# Patient Record
Sex: FEMALE | Race: BLACK OR AFRICAN AMERICAN | NOT HISPANIC OR LATINO | Employment: OTHER | URBAN - METROPOLITAN AREA
[De-identification: names, ages, dates, MRNs, and addresses within clinical notes are randomized per-mention and may not be internally consistent; named-entity substitution may affect disease eponyms.]

---

## 2017-09-25 ENCOUNTER — TELEPHONE (OUTPATIENT)
Dept: SURGERY | Facility: CLINIC | Age: 72
End: 2017-09-25

## 2017-09-25 NOTE — TELEPHONE ENCOUNTER
"----- Message from Fadia Wallace MD sent at 9/25/2017  4:13 PM CDT -----  Does this patient have records? Please find out and obtain for appt tmrw, ty    09/25/17     3082  Contacted patient regarding obtaining outside records prior to visit. Patient states "Dr. Sarina Macias told me that she would send it over. Let me reschedule the appointment for another day to give them time to gather the information." Appointment made for 09/27/17 at 1:20pm. Date and time confirmed. Patient verbalized understanding.    "

## 2017-09-27 ENCOUNTER — OFFICE VISIT (OUTPATIENT)
Dept: SURGERY | Facility: CLINIC | Age: 72
End: 2017-09-27
Payer: MEDICARE

## 2017-09-27 VITALS
TEMPERATURE: 98 F | HEIGHT: 61 IN | WEIGHT: 290 LBS | HEART RATE: 107 BPM | DIASTOLIC BLOOD PRESSURE: 86 MMHG | BODY MASS INDEX: 54.75 KG/M2 | SYSTOLIC BLOOD PRESSURE: 139 MMHG

## 2017-09-27 DIAGNOSIS — Z17.0 CANCER OF LEFT BREAST, STAGE 1, ESTROGEN RECEPTOR POSITIVE: ICD-10-CM

## 2017-09-27 DIAGNOSIS — C50.912 BREAST CANCER, STAGE 1, LEFT: Primary | ICD-10-CM

## 2017-09-27 DIAGNOSIS — C50.912 CANCER OF LEFT BREAST, STAGE 1, ESTROGEN RECEPTOR POSITIVE: ICD-10-CM

## 2017-09-27 PROCEDURE — 99999 PR PBB SHADOW E&M-EST. PATIENT-LVL III: CPT | Mod: PBBFAC,,, | Performed by: SURGERY

## 2017-09-27 PROCEDURE — 1125F AMNT PAIN NOTED PAIN PRSNT: CPT | Mod: S$GLB,,, | Performed by: SURGERY

## 2017-09-27 PROCEDURE — 1159F MED LIST DOCD IN RCRD: CPT | Mod: S$GLB,,, | Performed by: SURGERY

## 2017-09-27 PROCEDURE — 99205 OFFICE O/P NEW HI 60 MIN: CPT | Mod: 57,S$GLB,, | Performed by: SURGERY

## 2017-09-27 PROCEDURE — 3008F BODY MASS INDEX DOCD: CPT | Mod: S$GLB,,, | Performed by: SURGERY

## 2017-09-27 NOTE — PROGRESS NOTES
History & Physical    SUBJECTIVE:     History of Present Illness:  Pt referred by Dr. Macias for biopsy proven left breast cancer.  She gets her mammograms at [facility] every year since the age of 41.  She has no prior symptoms of breast pain, breast mass, nipple discharge, or skin change.    Menarche occurred at age 14.  Menopause at age 51.  She still has her uterus and ovaries in situ.  She is  with age of first pregnancy at 21.  No history of hormone replacement therapy.  She took oral contraceptive pills for 20-30 years.  No history of biopsy.  She has a maternal aunt with breast cancer and this aunt's daughter which is her first cousin has breast cancer also.    The patient has a history of sickle cell trait with no manifestation.  No history of transfusion.    Her images are not available for interpretation by me today.  However the reports show a 1.1 cm mass in the left breast at 8:00 which is hypoechoic with irregular borders, the axilla was scanned and negative.  This correlates to mammographic abnormality on the left.  A biopsy was performed which shows invasive ductal carcinoma high grade ER + greater than 95%, IA +93.6%, HER-2 +3+ by immunohistochemistry, Ki-67 greater than 20%, HER-2/bharti fish positive ratio greater than 11.4.  Cc left breast cancer    Review of patient's allergies indicates:  No Known Allergies    Current Outpatient Prescriptions   Medication Sig Dispense Refill    aspirin (ECOTRIN) 81 MG EC tablet Take 81 mg by mouth once daily.      loratadine (CLARITIN) 5 mg chewable tablet Take 5 mg by mouth once daily.      metolazone (ZAROXOLYN) 5 MG tablet 5 mg once daily.       peg 3350-electrolytes-vit C (MOVIPREP) 100-7.5-2.691 gram PwPk Take as directed 1 packet 0    potassium chloride (KLOR-CON) 10 MEQ TbSR 10 mEq 2 (two) times daily.       pravastatin (PRAVACHOL) 40 MG tablet 40 mg 2 (two) times daily.       valsartan-hydrochlorothiazide (DIOVAN-HCT) 160-25 mg per tablet        No  "current facility-administered medications for this visit.        Past Medical History:   Diagnosis Date    Diabetes mellitus     Hyperlipidemia     Hypertension      Past Surgical History:   Procedure Laterality Date    Hammer toe Left      No family history on file.  Social History   Substance Use Topics    Smoking status: Former Smoker     Packs/day: 0.90     Years: 5.00     Types: Cigarettes    Smokeless tobacco: Former User    Alcohol use 0.6 oz/week     1 Shots of liquor per week          Review of Systems   Constitutional: Negative for activity change, appetite change, chills and fever.   HENT: Negative for congestion and sore throat.    Eyes: Negative for photophobia and visual disturbance.   Respiratory: Negative for cough, shortness of breath and wheezing.    Cardiovascular: Negative for chest pain and palpitations.   Gastrointestinal: Negative for abdominal distention and abdominal pain.   Genitourinary: Negative for difficulty urinating, dysuria and frequency.   Musculoskeletal: Negative for gait problem and joint swelling.   Skin: Negative for rash and wound.   Neurological: Negative for dizziness and headaches.   Hematological: Negative for adenopathy. Does not bruise/bleed easily.   Psychiatric/Behavioral: Negative for dysphoric mood and sleep disturbance.       OBJECTIVE:     Vital Signs (Most Recent)  Temp: 97.7 °F (36.5 °C) (09/27/17 1334)  Pulse: 107 (09/27/17 1334)  BP: 139/86 (09/27/17 1334)  5' 1" (1.549 m)  131.5 kg (290 lb 0.2 oz)     Physical Exam   Constitutional: She is oriented to person, place, and time. She appears well-developed and well-nourished. No distress.   HENT:   Head: Normocephalic and atraumatic.   Eyes: Conjunctivae and EOM are normal. No scleral icterus.   Neck: Normal range of motion.   Cardiovascular: Normal rate and intact distal pulses.    Pulmonary/Chest: Effort normal. No stridor. No respiratory distress.   Abdominal: Soft. She exhibits no distension. There is " no tenderness.   Musculoskeletal: Normal range of motion. She exhibits no edema or tenderness.   Neurological: She is alert and oriented to person, place, and time.   Skin: No rash noted. No pallor.   Psychiatric: She has a normal mood and affect. Her behavior is normal.       Laboratory  n/a    Diagnostic Results:  n/a    ASSESSMENT/PLAN:     Assessment:      Clinical Stage IA ([id number]) carcinoma left breast left LIQ.  Triple positive 1.1cm.      Plan:     Options for management were discussed with the patient and her family. We reviewed the existing data noting the equivalency of breast conserving surgery with radiation therapy and mastectomy. We also reviewed the guidelines of the National Comprehensive Cancer Network for Stage I breast carcinoma. We discussed the need for lumpectomy margins to be negative for carcinoma, the necessity for postoperative radiation therapy after breast conservation in most cases, the possibility of a failed or false negative sentinel lymph node biopsy and the potential need for complete lymphadenectomy for a failed or positive sentinel lymph node biopsy were fully discussed. In the setting of mastectomy, delayed or immediate reconstruction options are available and were discussed.      In the setting of lumpectomy, radiation therapy would be recommended majority of the time.  The duration and treatment side effects were discussed with the patient.  This will coordinated with the radiation oncologist pending final pathology.     We also discussed the role of systemic therapy in the treatment of early stage breast cancer.  We discussed that this is based on tumor biology and shalom status and will be determined based on final pathology.  We discussed that if the cancer is hormone positive, endocrine therapy would be recommended in most cases and its use can reduce the risk of recurrence as well as improve survival. Side effects of treatment were briefly discussed. We also discussed the  potential role for chemotherapy based on a number of factors such as tumor phenotype (ER+ vs. triple negative vs. Tih1ttb+) and this would be determined in coordination with the medical oncologist.     The patient, in consultation with her family, has elected to proceed with left partial mastectomy and sentinel lymph node biopsy, Port placement RIGHT. The risks of surgery were described to the patient including bleeding, infection, pain, scarring, wound complications, injury to local structures, breast asymmetry, positive margin, recurrence, and potential need for further surgery. She demonstrated understanding of the risks and consent form signed today.      Patient was educated on breast cancer, receptors, wire localization lumpectomy, mastectomy, sentinel lymph node mapping and biopsy, axillary lymph node dissection, reconstruction, breast prosthesis with post-mastectomy bra and radiation therapy. Patient was given patient information binder including [facility] breast cancer treatment brochure.  All her questions were answered.     Scheduled for 10/12  Referral to hem/onc given her 2 +  Needs cmp, cbc, ekg, cxr pa lat prior to surgery  Ok for aspirins in periop period      Total time spent with the patient: 75 minutes. 50 minutes of face to face consultation and 25 minutes of chart review and coordination of care.

## 2017-09-28 ENCOUNTER — TELEPHONE (OUTPATIENT)
Dept: SURGERY | Facility: CLINIC | Age: 72
End: 2017-09-28

## 2017-09-28 NOTE — TELEPHONE ENCOUNTER
09/28/2017     1305  Contacted Alonzo at [facility] regarding obtaining Ms. Karen Duke's mammogram images on disk for Dr. Wallace's review. Spoke w/ Noy in medical records, and she stated that she would get the  to deliver it today.

## 2017-10-05 ENCOUNTER — OFFICE VISIT (OUTPATIENT)
Dept: HEMATOLOGY/ONCOLOGY | Facility: CLINIC | Age: 72
End: 2017-10-05
Payer: MEDICARE

## 2017-10-05 VITALS
DIASTOLIC BLOOD PRESSURE: 74 MMHG | BODY MASS INDEX: 54.07 KG/M2 | WEIGHT: 286.38 LBS | OXYGEN SATURATION: 94 % | SYSTOLIC BLOOD PRESSURE: 106 MMHG | HEART RATE: 114 BPM | HEIGHT: 61 IN

## 2017-10-05 DIAGNOSIS — C50.912 CANCER OF LEFT BREAST, STAGE 1, ESTROGEN RECEPTOR POSITIVE: Primary | ICD-10-CM

## 2017-10-05 DIAGNOSIS — Z17.0 CANCER OF LEFT BREAST, STAGE 1, ESTROGEN RECEPTOR POSITIVE: Primary | ICD-10-CM

## 2017-10-05 PROCEDURE — 99999 PR PBB SHADOW E&M-EST. PATIENT-LVL II: CPT | Mod: PBBFAC,,, | Performed by: INTERNAL MEDICINE

## 2017-10-05 PROCEDURE — 99205 OFFICE O/P NEW HI 60 MIN: CPT | Mod: S$GLB,,, | Performed by: INTERNAL MEDICINE

## 2017-10-05 NOTE — PROGRESS NOTES
Subjective:       Patient ID: Karen Duke is a 72 y.o. female.    Chief Complaint: No chief complaint on file.    HPI PCP Dr. Sarina Macias.    She has newly diagnosed left breast cancer on routine screening mammogram done in 2017.  A 1.1 centimeter lobulated mass was noticed in the 8:00 position in the left breast.  Images were done at [facility].  Biopsy was done on 17.  It was high-grade invasive ductal carcinoma - ER +96%, FL +94% and HER-2/bharti IHC 3+.  Ki-67 was 37%.     Menarche occurred at age 14.  Menopause at age 51.  She still has her uterus and ovaries in situ.  She is  with age of first pregnancy at 21.  No history of hormone replacement therapy.  She took oral contraceptive pills for 20-30 years.  No history of biopsy.      The patient has a history of sickle cell trait with no manifestation.  No history of transfusion.    She saw Dr. Wallace.  Scheduled for lumpectomy on the .    Review of Systems   Constitutional: Negative for appetite change, fatigue, fever and unexpected weight change.   HENT: Negative for facial swelling and nosebleeds.    Eyes: Negative for photophobia and pain.   Respiratory: Negative for cough and shortness of breath.    Cardiovascular: Negative for chest pain and leg swelling.   Gastrointestinal: Negative for abdominal pain, blood in stool and nausea.   Genitourinary: Negative for dysuria and hematuria.   Skin: Negative for color change and rash.   Neurological: Negative for seizures, weakness and headaches.   Hematological: Negative for adenopathy. Does not bruise/bleed easily.   All other systems reviewed and are negative.        Objective:      Physical Exam   Constitutional: She is oriented to person, place, and time. She appears well-developed and well-nourished. No distress.   HENT:   Head: Normocephalic and atraumatic.   Right Ear: Tympanic membrane, external ear and ear canal normal.   Left Ear: Tympanic membrane, external ear and ear canal normal.    Nose: Nose normal. No mucosal edema, sinus tenderness, septal deviation or nasal septal hematoma. No epistaxis. Right sinus exhibits no maxillary sinus tenderness and no frontal sinus tenderness. Left sinus exhibits no maxillary sinus tenderness and no frontal sinus tenderness.   Mouth/Throat: Oropharynx is clear and moist and mucous membranes are normal. Mucous membranes are not cyanotic. No oral lesions. No dental caries. No oropharyngeal exudate.   Hard and soft palate, tongue and posterior pharyngeal wall unremarkable   Eyes: Conjunctivae and lids are normal. No scleral icterus.   Neck: Trachea normal. Neck supple. No tracheal tenderness present. No tracheal deviation present. No thyroid mass (thyroid is non-tender) present.   Cardiovascular: Normal rate, regular rhythm, normal heart sounds, intact distal pulses and normal pulses.  Exam reveals no gallop.    No murmur heard.  No edema   Pulmonary/Chest: Effort normal and breath sounds normal. No accessory muscle usage or stridor. No respiratory distress. She has no decreased breath sounds. She has no wheezes. She has no rhonchi. She has no rales.   Abdominal: Soft. Bowel sounds are normal. She exhibits no distension and no mass. There is no hepatosplenomegaly, splenomegaly or hepatomegaly. There is no tenderness.   Musculoskeletal: She exhibits no edema or tenderness.   Lymphadenopathy:        Head (right side): No submental and no submandibular adenopathy present.        Head (left side): No submental and no submandibular adenopathy present.     She has no cervical adenopathy.     She has no axillary adenopathy.        Right: No supraclavicular adenopathy present.        Left: No supraclavicular adenopathy present.   Neurological: She is alert and oriented to person, place, and time. She has normal strength. She is not disoriented. No cranial nerve deficit or sensory deficit.   Skin: Skin is warm and intact. No petechiae and no rash noted. She is not  diaphoretic. No cyanosis. No pallor. Nails show no clubbing.   Psychiatric: She has a normal mood and affect. Her speech is normal and behavior is normal. Judgment and thought content normal. Her mood appears not anxious. She expresses no homicidal and no suicidal ideation.         Assessment:       1. Cancer of left breast, stage 1, estrogen receptor positive        Plan:   In summary this is a 72-year-old female with clinical stage I ER/AK/HER-2/bharti positive high-grade invasive ductal carcinoma of the left breast with Ki-67 at 37%.    She is appropriately scheduled for lumpectomy with sentinel lymph node biopsy for the 12th.    I discussed with her the fact that she will require adjuvant chemotherapy.    If she ends up having pathologic stage I disease then will plan to give weekly Taxol/Herceptin x 12 doses in the adjuvant setting, followed by adjuvant radiation therapy.    I will see her back in the clinic for follow-up in approximately 2 weeks after the surgery to finalize her chemotherapy plan.    Provided her with printed information on Taxol and Herceptin.    She will also need a chest port - as per Dr. Wallace.

## 2017-10-05 NOTE — LETTER
October 5, 2017      Fadia Wallace MD  [address]  [address]  [place] [facility] [place] [address]           [facility]  [address] [address]  Phone: [phone]          Patient: Karen Duke   MR Number: [id number]   YOB: 1945   Date of Visit: 10/5/2017       Dear Dr. Fadia Wallace:    Thank you for referring Karen Duke to me for evaluation. Attached you will find relevant portions of my assessment and plan of care.    If you have questions, please do not hesitate to call me. I look forward to following Karen Duke along with you.    Sincerely,    Nic Wilkins MD    Enclosure  CC:  No Recipients    If you would like to receive this communication electronically, please contact [email or web address] or [phone] to request more information on [email or web address] Link access.    For providers and/or their staff who would like to refer a patient to [facility], please contact us through our one-stop-shop provider referral line, [facility], at [phone].    If you feel you have received this communication in error or would no longer like to receive these types of communications, please e-mail [email or web address]

## 2017-10-06 ENCOUNTER — ANESTHESIA EVENT (OUTPATIENT)
Dept: SURGERY | Facility: HOSPITAL | Age: 72
End: 2017-10-06
Payer: MEDICARE

## 2017-10-06 ENCOUNTER — HOSPITAL ENCOUNTER (OUTPATIENT)
Dept: PREADMISSION TESTING | Facility: HOSPITAL | Age: 72
Discharge: HOME | End: 2017-10-06
Attending: SURGERY
Payer: MEDICARE

## 2017-10-06 ENCOUNTER — CLINICAL SUPPORT (OUTPATIENT)
Dept: LAB | Facility: HOSPITAL | Age: 72
End: 2017-10-06
Attending: SURGERY
Payer: MEDICARE

## 2017-10-06 ENCOUNTER — HOSPITAL ENCOUNTER (OUTPATIENT)
Dept: RADIOLOGY | Facility: HOSPITAL | Age: 72
Discharge: HOME | End: 2017-10-06
Attending: SURGERY
Payer: MEDICARE

## 2017-10-06 VITALS
SYSTOLIC BLOOD PRESSURE: 108 MMHG | WEIGHT: 286 LBS | BODY MASS INDEX: 54 KG/M2 | HEART RATE: 94 BPM | RESPIRATION RATE: 18 BRPM | DIASTOLIC BLOOD PRESSURE: 61 MMHG | HEIGHT: 61 IN | OXYGEN SATURATION: 97 %

## 2017-10-06 DIAGNOSIS — C50.912 CANCER OF LEFT BREAST, STAGE 1, ESTROGEN RECEPTOR POSITIVE: Primary | ICD-10-CM

## 2017-10-06 DIAGNOSIS — C50.912 CANCER OF LEFT BREAST, STAGE 1, ESTROGEN RECEPTOR POSITIVE: ICD-10-CM

## 2017-10-06 DIAGNOSIS — Z17.0 CANCER OF LEFT BREAST, STAGE 1, ESTROGEN RECEPTOR POSITIVE: ICD-10-CM

## 2017-10-06 DIAGNOSIS — Z17.0 CANCER OF LEFT BREAST, STAGE 1, ESTROGEN RECEPTOR POSITIVE: Primary | ICD-10-CM

## 2017-10-06 PROCEDURE — 71020 XR CHEST PA AND LATERAL PRE-OP: CPT | Mod: TC

## 2017-10-06 PROCEDURE — 71020 XR CHEST PA AND LATERAL PRE-OP: CPT | Mod: 26,,, | Performed by: RADIOLOGY

## 2017-10-06 PROCEDURE — 93005 ELECTROCARDIOGRAM TRACING: CPT

## 2017-10-06 RX ORDER — LIDOCAINE HYDROCHLORIDE 10 MG/ML
1 INJECTION, SOLUTION EPIDURAL; INFILTRATION; INTRACAUDAL; PERINEURAL ONCE
Status: CANCELLED | OUTPATIENT
Start: 2017-10-06 | End: 2017-10-06

## 2017-10-06 RX ORDER — METFORMIN HYDROCHLORIDE 500 MG/1
500 TABLET, EXTENDED RELEASE ORAL
COMMUNITY

## 2017-10-06 RX ORDER — SODIUM CHLORIDE, SODIUM LACTATE, POTASSIUM CHLORIDE, CALCIUM CHLORIDE 600; 310; 30; 20 MG/100ML; MG/100ML; MG/100ML; MG/100ML
INJECTION, SOLUTION INTRAVENOUS CONTINUOUS
Status: CANCELLED | OUTPATIENT
Start: 2017-10-06

## 2017-10-06 RX ORDER — GLIPIZIDE 5 MG/1
5 TABLET ORAL
COMMUNITY
End: 2018-07-05

## 2017-10-06 NOTE — ANESTHESIA PREPROCEDURE EVALUATION
10/06/2017  Karen Duke is a 72 y.o., female is scheduled for left breast lumpectomy with needle loc sentinel node injection and right port-a-cath insertion under GETA on 10/12/2017.    Past Surgical History:   Procedure Laterality Date    EYE SURGERY Right     cataract    Hammer toe Left        Anesthesia Evaluation    I have reviewed the Patient Summary Reports.    I have reviewed the Nursing Notes.   I have reviewed the Medications.     Review of Systems  Anesthesia Hx:  No problems with previous Anesthesia  History of prior surgery of interest to airway management or planning: Previous anesthesia: MAC, General Denies Family Hx of Anesthesia complications.   Denies Personal Hx of Anesthesia complications.   Social:  Former Smoker, Social Alcohol Use    Hematology/Oncology:  Hematology Normal        EENT/Dental:   chronic allergic rhinitis   Cardiovascular:   Exercise tolerance: good Hypertension, well controlled Denies Dysrhythmias.   Denies Angina. hyperlipidemia        Pulmonary:   Denies Shortness of breath.    Renal/:  Renal/ Normal     Hepatic/GI:   GERD, well controlled    Neurological:  Neurology Normal    Endocrine:   Diabetes, type 2           Physical Exam  General:  Morbid Obesity    Airway/Jaw/Neck:  Airway Findings: Mouth Opening: Normal Tongue: Normal  General Airway Assessment: Adult  Mallampati: I  TM Distance: 4 - 6 cm        Eyes/Ears/Nose:  EYES/EARS/NOSE FINDINGS: Normal   Dental:  Dental Findings: Periodontal disease, Mild, In tact, upper partial dentures, lower partial dentures   Chest/Lungs:  Chest/Lungs Clear    Heart/Vascular:  Heart Findings: Normal Heart murmur: negative    Abdomen:  Abdomen Findings: Normal      Mental Status:  Mental Status Findings: Normal        Anesthesia Plan  Type of Anesthesia, risks & benefits discussed:  Anesthesia Type:  general  Patient's  Preference:   Intra-op Monitoring Plan: standard ASA monitors  Intra-op Monitoring Plan Comments:   Post Op Pain Control Plan: multimodal analgesia and per primary service following discharge from PACU  Post Op Pain Control Plan Comments:   Induction:   IV  Beta Blocker:  Patient is not currently on a Beta-Blocker (No further documentation required).       Informed Consent: Patient understands risks and agrees with Anesthesia plan.  Questions answered. Anesthesia consent signed with patient.  ASA Score: 3     Day of Surgery Review of History & Physical: I have interviewed and examined the patient. I have reviewed the patient's H&P dated:  There are no significant changes. Significant changes noted: Surgeon notified.          Ready For Surgery From Anesthesia Perspective.

## 2017-10-06 NOTE — DISCHARGE INSTRUCTIONS
Your surgery is scheduled for 10/12/17.    Please report to Outpatient Surgery Intake Office on the 2nd FLOOR at 5:30 a.m.          INSTRUCTIONS IMPORTANT!!!  ¨ Do not eat or drink after 12 midnight-including water. OK to brush teeth, no   gum, candy or mints!    ¨ Take only these medicines with a small swallow of water-morning of surgery: Valsartan        ____  Proceed to [facility] on 10/6/17 for additional blood test.        ____  Do not wear makeup, including mascara.  ____  No powder, lotions or creams to surgical area.  ____  Please remove all jewelry, including piercings and leave at home.  ____  No money or valuables needed. Please leave at home.  ____  Please bring any documents given by your doctor.  ____  If going home the same day, arrange for a ride home. You will not be able to             drive if Anesthesia was used.  ____  Wear loose fitting clothing. Allow for dressings, bandages.  ____  Wash the surgical area with Hibiclens the night before surgery, and again the             morning of surgery.  Be sure to rinse hibiclens off completely (if instructed by   nurse).  ____  If you take diabetic medication, do not take am of surgery unless instructed by Doctor.  ____  Call MD for temperature above 101 degrees.  ____ Do Not wear your contact lenses the day of your procedure.  You may wear your glasses.        I have read or had read and explained to me, and understand the above information.  Additional comments or instructions:  For additional questions call [phone]     Take a Hibiclens shower twice a day for 3 days prior to surgery, including the morning of surgery.   Gargle with Listerine twice a day for 3 days prior to surgery, including the morning of surgery.      Pre-Op Bathing Instructions    Before surgery, you can play an important role in your own health.    Because skin is not sterile, we need to be sure that your skin is as free of germs as possible. By following the  instructions below, you can reduce the number of germs on your skin before surgery.    IMPORTANT: You will need to shower with a special soap called Hibiclens*, available at any pharmacy.  If you are allergic to Chlorhexidine (the antiseptic in Hibiclens), use an antibacterial soap such as Dial Soap for your preoperative shower.  You will shower with Hibiclens both the night before your surgery and the morning of your surgery.  Do not use Hibiclens on the head, face or genitals to avoid injury to those areas.    STEP #1: THE NIGHT BEFORE YOUR SURGERY     1. Do not shave the area of your body where your surgery will be performed.  2. Shower and wash your hair and body as usual with your normal soap and shampoo.  3. Rinse your hair and body thoroughly after you shower to remove all soap residue.  4. With your hand, apply one packet of Hibiclens soap to the surgical site.   5. Wash the site gently for five (5) minutes. Do not scrub your skin too hard.   6. Do not wash with your regular soap after Hibiclens is used.  7. Rinse your body thoroughly.  8. Pat yourself dry with a clean, soft towel.  9. Do not use lotion, cream, or powder.  10. Wear clean clothes.    STEP #2: THE MORNING OF YOUR SURGERY     1. Repeat Step #1.    * Not to be used by people allergic to Chlorhexidine.          Anesthesia: General Anesthesia     You are watched continuously during your procedure by your anesthesia provider.     Youre due to have surgery. During surgery, youll be given medicine called anesthesia or anesthetic. This will keep you comfortable and pain-free. Your anesthesia provider will use general anesthesia.  What is general anesthesia?  General anesthesia puts you into a state like deep sleep. It goes into the bloodstream (IV anesthetics), into the lungs (gas anesthetics), or both. You feel nothing during the procedure. You will not remember it. During the procedure, the anesthesia provider monitors you continuously. He or she  checks your heart rate and rhythm, blood pressure, breathing, and blood oxygen.  · IV anesthetics. IV anesthetics are given through an IV line in your arm. Theyre often given first. This is so you are asleep before a gas anesthetic is started. Some kinds of IV anesthetics relieve pain. Others relax you. Your doctor will decide which kind is best in your case.  · Gas anesthetics. Gas anesthetics are breathed into the lungs. They are often used to keep you asleep. They can be given through a facemask or a tube placed in your larynx or trachea (breathing tube).  ¨ If you have a facemask, your anesthesia provider will most likely place it over your nose and mouth while youre still awake. Youll breathe oxygen through the mask as your IV anesthetic is started. Gas anesthetic may be added through the mask.  ¨ If you have a tube in the larynx or trachea, it will be inserted into your throat after youre asleep.  Anesthesia tools and medicines  You will likely have:  · IV anesthetics. These are put into an IV line into your bloodstream.  · Gas anesthetics. You breathe these anesthetics into your lungs, where they pass into your bloodstream.  · Pulse oximeter. This is a small clip that is attached to the end of your finger. This measures your blood oxygen level.  · Electrocardiography leads (electrodes). These are small sticky pads that are placed on your chest. They record your heart rate and rhythm.  · Blood pressure cuff. This reads your blood pressure.  Risks and possible complications  General anesthesia has some risks. These include:  · Breathing problems  · Nausea and vomiting  · Sore throat or hoarseness (usually temporary)  · Allergic reaction to the anesthetic  · Irregular heartbeat (rare)  · Cardiac arrest (rare)   Anesthesia safety  · Follow all instructions you are given for how long not to eat or drink before your procedure.  · Be sure your doctor knows what medicines and drugs you take. This includes  over-the-counter medicines, herbs, supplements, alcohol or other drugs. You will be asked when those were last taken.  · Have an adult family member or friend drive you home after the procedure.  · For the first 24 hours after your surgery:  ¨ Do not drive or use heavy equipment.  ¨ Do not make important decisions or sign legal documents. If important decisions or signing legal documents is necessary during the first 24 hours after surgery, have a trusted family member or spouse act on your behalf.  ¨ Avoid alcohol.  ¨ Have a responsible adult stay with you. He or she can watch for problems and help keep you safe.  Date Last Reviewed: 12/1/2016 © 2000-2017 [employer]. [address] [place] [address], [place] [address]. All rights reserved. This information is not intended as a substitute for professional medical care. Always follow your healthcare professional's instructions.      Lumpectomy     You will have an incision near the tumor on the breast for the lumpectomy. You may also have a second incision under the arm, near the lymph nodes, for sentinel node biopsy or lymph node removal.     Lumpectomy is surgery to remove cancer. It's a breast-conserving surgery, which means most of your breast remains intact. There will be one incision (cut) on the breast for the lumpectomy procedure. You may also have a second incision under the arm for a sentinel lymph node biopsy, or for removal of the lymph nodes. If you're having a lumpectomy, you'll probably also need radiation therapy later, after you heal.  Before surgery  A week or more before the procedure, you will have an exam and routine tests. Before surgery:  · Sign any consent forms.  · Tell your healthcare provider about any medicines, herbs, or supplements that you are taking.  · Avoid eating or drinking for 8 to 12 hours before your surgery.  · Arrange for a trusted adult to drive you home after surgery.  · Bring a soft shirt that buttons in front to wear  home.  · Talk to the anesthesia care provider. He or she will explain how you will be kept free of pain during surgery.  During surgery  Your surgeon will make an incision near the tumor. The tumor and a surrounding margin of normal tissue will be removed. A second incision may also be made under the arm to remove some of the nearby axillary lymph nodes. These are checked to see if the cancer has spread to them. When the surgery is finished, the incisions will be closed using stitches. A gauze dressing will cover the incisions.   Right after surgery  You will wake up in the recovery room. You may have an IV (intravenous) line for fluids and medicines. Pain medicines will be given to you as needed. A nurse will check your temperature, pulse, and blood pressure. You'll likely go home the same day.  You will be given instructions on how to care for the incisions, what kind of pain medicines you should use, and how to take care of yourself as you recover. Make sure you understand all the instructions and know when you need to next see the healthcare provider.   When to call your healthcare provider  Call your healthcare provider right away if you have any of the following after surgery:  · Fever  · Chills  · Increased pain, warmth, drainage, swelling, or redness at the incision(s)  · Cough or shortness of breath  · Pain in the chest or calf  · Bleeding that soaks the dressing  · Any other problems your healthcare providers told you to watch for and report  Be sure you know how to reach your healthcare provider if you have any problems. Know how to get help after office hours, on weekends, and on holidays, too.   Date Last Reviewed: 10/31/2015  © 4906-8425 [employer]. [address] [place] [address], [place] [address]. All rights reserved. This information is not intended as a substitute for professional medical care. Always follow your healthcare professional's instructions.

## 2017-10-06 NOTE — PRE-PROCEDURE INSTRUCTIONS
Darrick Ricketts - [phone]    Allergies, medical, surgical, family and psychosocial histories reviewed with patient. Periop plan of care reviewed. Preop instructions given, including medications to take and to hold. Time allotted for questions to be addressed.  Patient verbalized understanding.

## 2017-10-09 ENCOUNTER — TELEPHONE (OUTPATIENT)
Dept: SURGERY | Facility: CLINIC | Age: 72
End: 2017-10-09

## 2017-10-09 NOTE — TELEPHONE ENCOUNTER
10/09/17     0957  Contacted patient to instruct her to eat 2 bananas BID until 12am on 10/11/17 when fasting begins. Patient also informed that she would need to see a Cardiologist prior to surgery. Patient verbalized understanding. Appointment made with Cardiologist for 10/10/17 at 3:20pm. Date, time, and location confirmed.

## 2017-10-10 ENCOUNTER — OFFICE VISIT (OUTPATIENT)
Dept: CARDIOLOGY | Facility: CLINIC | Age: 72
End: 2017-10-10
Payer: MEDICARE

## 2017-10-10 VITALS
OXYGEN SATURATION: 94 % | HEART RATE: 92 BPM | HEIGHT: 61 IN | WEIGHT: 283.88 LBS | SYSTOLIC BLOOD PRESSURE: 119 MMHG | DIASTOLIC BLOOD PRESSURE: 83 MMHG | BODY MASS INDEX: 53.6 KG/M2

## 2017-10-10 DIAGNOSIS — E66.9 OBESITY, UNSPECIFIED CLASSIFICATION, UNSPECIFIED OBESITY TYPE, UNSPECIFIED WHETHER SERIOUS COMORBIDITY PRESENT: ICD-10-CM

## 2017-10-10 DIAGNOSIS — E78.5 HYPERLIPIDEMIA, UNSPECIFIED HYPERLIPIDEMIA TYPE: ICD-10-CM

## 2017-10-10 DIAGNOSIS — Z01.818 PRE-OP EXAMINATION: Primary | ICD-10-CM

## 2017-10-10 DIAGNOSIS — E13.8 OTHER SPECIFIED DIABETES MELLITUS WITH COMPLICATION, WITHOUT LONG-TERM CURRENT USE OF INSULIN: ICD-10-CM

## 2017-10-10 DIAGNOSIS — I10 ESSENTIAL HYPERTENSION: ICD-10-CM

## 2017-10-10 PROBLEM — E11.9 DM (DIABETES MELLITUS): Status: ACTIVE | Noted: 2017-10-10

## 2017-10-10 PROCEDURE — 99999 PR PBB SHADOW E&M-EST. PATIENT-LVL III: CPT | Mod: PBBFAC,,, | Performed by: INTERNAL MEDICINE

## 2017-10-10 PROCEDURE — 99204 OFFICE O/P NEW MOD 45 MIN: CPT | Mod: S$GLB,,, | Performed by: INTERNAL MEDICINE

## 2017-10-10 NOTE — PROGRESS NOTES
Subjective:    Patient ID:  Karen Duke is a 72 y.o. female who presents for evaluation of Pre-op Exam      HPI  71 y/o female with hx of HTN, HLD, DM, obesity who presents for cara-operative evaluation prior to lumpectomy. She is asymptomatic from a cardiac perspective and denies CP, SOB/GERARD, orthopnea, PND, palps, syncope, LE edema. She is a former smoker and quit a few years ago (up to 1 PPD). She is able to accomplish her ADL's (>4 METS) without cardiac complaints. She is compliant with meds. ECG reviewed and shows NSR with NSSTTWA.    Review of Systems   Constitution: Negative for weakness and malaise/fatigue.   HENT: Negative for congestion.    Eyes: Negative for blurred vision.   Cardiovascular: Positive for dyspnea on exertion. Negative for chest pain, claudication, cyanosis, irregular heartbeat, leg swelling, near-syncope, orthopnea, palpitations, paroxysmal nocturnal dyspnea and syncope.   Respiratory: Negative for shortness of breath.    Endocrine: Negative for polyuria.   Hematologic/Lymphatic: Negative for bleeding problem.   Skin: Negative for itching and rash.   Musculoskeletal: Negative for joint swelling, muscle cramps and muscle weakness.   Gastrointestinal: Negative for abdominal pain, hematemesis, hematochezia, melena, nausea and vomiting.   Genitourinary: Negative for dysuria and hematuria.   Neurological: Negative for dizziness, focal weakness, headaches, light-headedness and loss of balance.   Psychiatric/Behavioral: Negative for depression. The patient is not nervous/anxious.         Objective:    Physical Exam   Constitutional: She is oriented to person, place, and time. She appears well-developed and well-nourished.   HENT:   Head: Normocephalic and atraumatic.   Neck: Neck supple. No JVD present.   Cardiovascular: Normal rate, regular rhythm and normal heart sounds.    Pulses:       Carotid pulses are 2+ on the right side, and 2+ on the left side.       Radial pulses are 2+ on the right  side, and 2+ on the left side.        Femoral pulses are 2+ on the right side, and 2+ on the left side.       Dorsalis pedis pulses are 2+ on the right side, and 2+ on the left side.        Posterior tibial pulses are 2+ on the right side, and 2+ on the left side.   Pulmonary/Chest: Effort normal and breath sounds normal.   Abdominal: Soft. Bowel sounds are normal.   Musculoskeletal: She exhibits no edema.   Neurological: She is alert and oriented to person, place, and time.   Skin: Skin is warm and dry.   Psychiatric: She has a normal mood and affect. Her behavior is normal. Thought content normal.         Assessment:       1. Pre-op examination    2. Essential hypertension    3. Hyperlipidemia, unspecified hyperlipidemia type    4. Other specified diabetes mellitus with complication, without long-term current use of insulin    5. Obesity, unspecified classification, unspecified obesity type, unspecified whether serious comorbidity present      71 y/o female with hx and presentation as above. Doing well from a cardiac perspective with no active cardiac complaints. No absolute contraindications for upcoming procedure.        Plan:        -The pt is at an acceptable risk from a cardiac perspective for upcoming non cardiac surgery

## 2017-10-12 ENCOUNTER — HOSPITAL ENCOUNTER (OUTPATIENT)
Dept: RADIOLOGY | Facility: HOSPITAL | Age: 72
Discharge: HOME | End: 2017-10-12
Attending: SURGERY | Admitting: SURGERY
Payer: MEDICARE

## 2017-10-12 ENCOUNTER — ANESTHESIA (OUTPATIENT)
Dept: SURGERY | Facility: HOSPITAL | Age: 72
End: 2017-10-12
Payer: MEDICARE

## 2017-10-12 ENCOUNTER — TELEPHONE (OUTPATIENT)
Dept: HEMATOLOGY/ONCOLOGY | Facility: CLINIC | Age: 72
End: 2017-10-12

## 2017-10-12 ENCOUNTER — HOSPITAL ENCOUNTER (OUTPATIENT)
Facility: HOSPITAL | Age: 72
Discharge: HOME | End: 2017-10-12
Attending: SURGERY | Admitting: SURGERY
Payer: MEDICARE

## 2017-10-12 VITALS
SYSTOLIC BLOOD PRESSURE: 141 MMHG | OXYGEN SATURATION: 95 % | BODY MASS INDEX: 54.19 KG/M2 | HEART RATE: 101 BPM | WEIGHT: 287 LBS | RESPIRATION RATE: 17 BRPM | DIASTOLIC BLOOD PRESSURE: 81 MMHG | TEMPERATURE: 98 F | HEIGHT: 61 IN

## 2017-10-12 DIAGNOSIS — C50.919 BREAST CANCER: ICD-10-CM

## 2017-10-12 DIAGNOSIS — C50.912 CANCER OF LEFT BREAST, STAGE 1, ESTROGEN RECEPTOR POSITIVE: Primary | ICD-10-CM

## 2017-10-12 DIAGNOSIS — C50.912 BREAST CANCER, LEFT: ICD-10-CM

## 2017-10-12 DIAGNOSIS — Z17.0 CANCER OF LEFT BREAST, STAGE 1, ESTROGEN RECEPTOR POSITIVE: Primary | ICD-10-CM

## 2017-10-12 DIAGNOSIS — C50.912 BREAST CANCER, STAGE 1, LEFT: ICD-10-CM

## 2017-10-12 LAB
ANION GAP SERPL CALC-SCNC: 12 MMOL/L
BUN SERPL-MCNC: 15 MG/DL
CALCIUM SERPL-MCNC: 9.5 MG/DL
CHLORIDE SERPL-SCNC: 98 MMOL/L
CO2 SERPL-SCNC: 30 MMOL/L
CREAT SERPL-MCNC: 0.9 MG/DL
EST. GFR  (AFRICAN AMERICAN): >60 ML/MIN/1.73 M^2
EST. GFR  (NON AFRICAN AMERICAN): >60 ML/MIN/1.73 M^2
GLUCOSE SERPL-MCNC: 122 MG/DL
POTASSIUM SERPL-SCNC: 3 MMOL/L
SODIUM SERPL-SCNC: 140 MMOL/L

## 2017-10-12 PROCEDURE — 63600175 PHARM REV CODE 636 W HCPCS: Performed by: SURGERY

## 2017-10-12 PROCEDURE — C1729 CATH, DRAINAGE: HCPCS | Performed by: SURGERY

## 2017-10-12 PROCEDURE — 25000003 PHARM REV CODE 250

## 2017-10-12 PROCEDURE — 76937 US GUIDE VASCULAR ACCESS: CPT | Mod: 26,,, | Performed by: SURGERY

## 2017-10-12 PROCEDURE — 27201423 OPTIME MED/SURG SUP & DEVICES STERILE SUPPLY: Performed by: SURGERY

## 2017-10-12 PROCEDURE — 88307 TISSUE EXAM BY PATHOLOGIST: CPT | Performed by: PATHOLOGY

## 2017-10-12 PROCEDURE — 19301 PARTIAL MASTECTOMY: CPT | Mod: 51,LT,, | Performed by: SURGERY

## 2017-10-12 PROCEDURE — 80048 BASIC METABOLIC PNL TOTAL CA: CPT

## 2017-10-12 PROCEDURE — 77001 FLUOROGUIDE FOR VEIN DEVICE: CPT | Mod: 26,,, | Performed by: SURGERY

## 2017-10-12 PROCEDURE — 37000009 HC ANESTHESIA EA ADD 15 MINS: Performed by: SURGERY

## 2017-10-12 PROCEDURE — 63600175 PHARM REV CODE 636 W HCPCS: Performed by: NURSE ANESTHETIST, CERTIFIED REGISTERED

## 2017-10-12 PROCEDURE — 88342 IMHCHEM/IMCYTCHM 1ST ANTB: CPT | Mod: 26,,, | Performed by: PATHOLOGY

## 2017-10-12 PROCEDURE — 36000707: Performed by: SURGERY

## 2017-10-12 PROCEDURE — 36000706: Performed by: SURGERY

## 2017-10-12 PROCEDURE — A9520 TC99 TILMANOCEPT DIAG 0.5MCI: HCPCS

## 2017-10-12 PROCEDURE — 19281 PERQ DEVICE BREAST 1ST IMAG: CPT | Mod: TC

## 2017-10-12 PROCEDURE — C1788 PORT, INDWELLING, IMP: HCPCS | Performed by: SURGERY

## 2017-10-12 PROCEDURE — 36415 COLL VENOUS BLD VENIPUNCTURE: CPT

## 2017-10-12 PROCEDURE — 38525 BIOPSY/REMOVAL LYMPH NODES: CPT | Mod: 51,LT,, | Performed by: SURGERY

## 2017-10-12 PROCEDURE — 38900 IO MAP OF SENT LYMPH NODE: CPT | Mod: ,,, | Performed by: SURGERY

## 2017-10-12 PROCEDURE — 38792 RA TRACER ID OF SENTINL NODE: CPT | Mod: ,,, | Performed by: RADIOLOGY

## 2017-10-12 PROCEDURE — 76098 X-RAY EXAM SURGICAL SPECIMEN: CPT | Mod: TC

## 2017-10-12 PROCEDURE — 71000033 HC RECOVERY, INTIAL HOUR: Performed by: SURGERY

## 2017-10-12 PROCEDURE — 88331 PATH CONSLTJ SURG 1 BLK 1SPC: CPT | Mod: 26,,, | Performed by: PATHOLOGY

## 2017-10-12 PROCEDURE — 88307 TISSUE EXAM BY PATHOLOGIST: CPT | Mod: 26,,, | Performed by: PATHOLOGY

## 2017-10-12 PROCEDURE — 25000003 PHARM REV CODE 250: Performed by: SURGERY

## 2017-10-12 PROCEDURE — 37000008 HC ANESTHESIA 1ST 15 MINUTES: Performed by: SURGERY

## 2017-10-12 PROCEDURE — 71000016 HC POSTOP RECOV ADDL HR: Performed by: SURGERY

## 2017-10-12 PROCEDURE — 25000003 PHARM REV CODE 250: Performed by: NURSE ANESTHETIST, CERTIFIED REGISTERED

## 2017-10-12 PROCEDURE — 71000015 HC POSTOP RECOV 1ST HR: Performed by: SURGERY

## 2017-10-12 PROCEDURE — 36561 INSERT TUNNELED CV CATH: CPT | Mod: 51,,, | Performed by: SURGERY

## 2017-10-12 PROCEDURE — 76098 X-RAY EXAM SURGICAL SPECIMEN: CPT | Mod: 26,,, | Performed by: RADIOLOGY

## 2017-10-12 PROCEDURE — 25000003 PHARM REV CODE 250: Performed by: NURSE PRACTITIONER

## 2017-10-12 PROCEDURE — 71000039 HC RECOVERY, EACH ADD'L HOUR: Performed by: SURGERY

## 2017-10-12 PROCEDURE — 19281 PERQ DEVICE BREAST 1ST IMAG: CPT | Mod: ,,, | Performed by: RADIOLOGY

## 2017-10-12 PROCEDURE — 38792 RA TRACER ID OF SENTINL NODE: CPT | Mod: TC

## 2017-10-12 PROCEDURE — 14001 TIS TRNFR TRUNK 10.1-30SQCM: CPT | Mod: ,,, | Performed by: SURGERY

## 2017-10-12 PROCEDURE — C1769 GUIDE WIRE: HCPCS

## 2017-10-12 DEVICE — PORT POWER CLEAR VIEW: Type: IMPLANTABLE DEVICE | Site: CHEST  WALL | Status: FUNCTIONAL

## 2017-10-12 RX ORDER — ACETAMINOPHEN 10 MG/ML
INJECTION, SOLUTION INTRAVENOUS
Status: DISCONTINUED | OUTPATIENT
Start: 2017-10-12 | End: 2017-10-12

## 2017-10-12 RX ORDER — HEPARIN SODIUM 1000 [USP'U]/ML
INJECTION, SOLUTION INTRAVENOUS; SUBCUTANEOUS
Status: DISCONTINUED | OUTPATIENT
Start: 2017-10-12 | End: 2017-10-12 | Stop reason: HOSPADM

## 2017-10-12 RX ORDER — SODIUM CHLORIDE 9 MG/ML
INJECTION, SOLUTION INTRAVENOUS CONTINUOUS
Status: DISCONTINUED | OUTPATIENT
Start: 2017-10-12 | End: 2017-10-12 | Stop reason: HOSPADM

## 2017-10-12 RX ORDER — ONDANSETRON 8 MG/1
TABLET, ORALLY DISINTEGRATING ORAL
Status: COMPLETED
Start: 2017-10-12 | End: 2017-10-12

## 2017-10-12 RX ORDER — ONDANSETRON 4 MG/1
4 TABLET, FILM COATED ORAL EVERY 6 HOURS PRN
Qty: 20 TABLET | Refills: 0 | Status: SHIPPED | OUTPATIENT
Start: 2017-10-12

## 2017-10-12 RX ORDER — PROPOFOL 10 MG/ML
VIAL (ML) INTRAVENOUS
Status: DISCONTINUED | OUTPATIENT
Start: 2017-10-12 | End: 2017-10-12

## 2017-10-12 RX ORDER — ONDANSETRON 8 MG/1
8 TABLET, ORALLY DISINTEGRATING ORAL ONCE
Status: COMPLETED | OUTPATIENT
Start: 2017-10-12 | End: 2017-10-12

## 2017-10-12 RX ORDER — HYDROCODONE BITARTRATE AND ACETAMINOPHEN 5; 325 MG/1; MG/1
TABLET ORAL
Qty: 50 TABLET | Refills: 0 | Status: SHIPPED | OUTPATIENT
Start: 2017-10-12 | End: 2018-05-28

## 2017-10-12 RX ORDER — LIDOCAINE HYDROCHLORIDE 10 MG/ML
1 INJECTION, SOLUTION EPIDURAL; INFILTRATION; INTRACAUDAL; PERINEURAL ONCE
Status: DISCONTINUED | OUTPATIENT
Start: 2017-10-12 | End: 2017-10-12 | Stop reason: HOSPADM

## 2017-10-12 RX ORDER — ISOSULFAN BLUE 50 MG/5ML
INJECTION, SOLUTION SUBCUTANEOUS
Status: DISCONTINUED | OUTPATIENT
Start: 2017-10-12 | End: 2017-10-12 | Stop reason: HOSPADM

## 2017-10-12 RX ORDER — ONDANSETRON HYDROCHLORIDE 2 MG/ML
INJECTION, SOLUTION INTRAMUSCULAR; INTRAVENOUS
Status: DISCONTINUED | OUTPATIENT
Start: 2017-10-12 | End: 2017-10-12

## 2017-10-12 RX ORDER — HEPARIN SODIUM 5000 [USP'U]/ML
5000 INJECTION, SOLUTION INTRAVENOUS; SUBCUTANEOUS EVERY 8 HOURS
Status: DISCONTINUED | OUTPATIENT
Start: 2017-10-12 | End: 2017-10-12 | Stop reason: HOSPADM

## 2017-10-12 RX ORDER — CEFAZOLIN SODIUM 2 G/50ML
2 SOLUTION INTRAVENOUS
Status: DISCONTINUED | OUTPATIENT
Start: 2017-10-12 | End: 2017-10-12 | Stop reason: DRUGHIGH

## 2017-10-12 RX ORDER — SUCCINYLCHOLINE CHLORIDE 20 MG/ML
INJECTION INTRAMUSCULAR; INTRAVENOUS
Status: DISCONTINUED | OUTPATIENT
Start: 2017-10-12 | End: 2017-10-12

## 2017-10-12 RX ORDER — LIDOCAINE HCL/PF 100 MG/5ML
SYRINGE (ML) INTRAVENOUS
Status: DISCONTINUED | OUTPATIENT
Start: 2017-10-12 | End: 2017-10-12

## 2017-10-12 RX ORDER — FENTANYL CITRATE 50 UG/ML
INJECTION, SOLUTION INTRAMUSCULAR; INTRAVENOUS
Status: DISCONTINUED | OUTPATIENT
Start: 2017-10-12 | End: 2017-10-12

## 2017-10-12 RX ORDER — PHENYLEPHRINE HYDROCHLORIDE 10 MG/ML
INJECTION INTRAVENOUS
Status: DISCONTINUED | OUTPATIENT
Start: 2017-10-12 | End: 2017-10-12

## 2017-10-12 RX ORDER — SODIUM CHLORIDE 0.9 % (FLUSH) 0.9 %
3 SYRINGE (ML) INJECTION
Status: DISCONTINUED | OUTPATIENT
Start: 2017-10-12 | End: 2017-10-12 | Stop reason: HOSPADM

## 2017-10-12 RX ORDER — SODIUM CHLORIDE, SODIUM LACTATE, POTASSIUM CHLORIDE, CALCIUM CHLORIDE 600; 310; 30; 20 MG/100ML; MG/100ML; MG/100ML; MG/100ML
INJECTION, SOLUTION INTRAVENOUS CONTINUOUS
Status: DISCONTINUED | OUTPATIENT
Start: 2017-10-12 | End: 2017-10-12 | Stop reason: HOSPADM

## 2017-10-12 RX ORDER — ONDANSETRON 2 MG/ML
4 INJECTION INTRAMUSCULAR; INTRAVENOUS DAILY PRN
Status: DISCONTINUED | OUTPATIENT
Start: 2017-10-12 | End: 2017-10-12 | Stop reason: HOSPADM

## 2017-10-12 RX ORDER — HYDROCODONE BITARTRATE AND ACETAMINOPHEN 5; 325 MG/1; MG/1
1 TABLET ORAL EVERY 6 HOURS PRN
Status: COMPLETED | OUTPATIENT
Start: 2017-10-12 | End: 2017-10-12

## 2017-10-12 RX ORDER — HEPARIN SODIUM 5000 [USP'U]/ML
5000 INJECTION, SOLUTION INTRAVENOUS; SUBCUTANEOUS EVERY 8 HOURS
Status: DISCONTINUED | OUTPATIENT
Start: 2017-10-12 | End: 2017-10-12

## 2017-10-12 RX ORDER — DOCUSATE SODIUM 100 MG/1
100 CAPSULE, LIQUID FILLED ORAL 2 TIMES DAILY
Qty: 60 CAPSULE | Refills: 0 | Status: SHIPPED | OUTPATIENT
Start: 2017-10-12 | End: 2017-11-15

## 2017-10-12 RX ORDER — MIDAZOLAM HYDROCHLORIDE 1 MG/ML
INJECTION INTRAMUSCULAR; INTRAVENOUS
Status: DISCONTINUED | OUTPATIENT
Start: 2017-10-12 | End: 2017-10-12

## 2017-10-12 RX ORDER — BUPIVACAINE HYDROCHLORIDE 2.5 MG/ML
INJECTION, SOLUTION EPIDURAL; INFILTRATION; INTRACAUDAL
Status: DISCONTINUED | OUTPATIENT
Start: 2017-10-12 | End: 2017-10-12 | Stop reason: HOSPADM

## 2017-10-12 RX ORDER — HYDROMORPHONE HYDROCHLORIDE 2 MG/ML
0.5 INJECTION, SOLUTION INTRAMUSCULAR; INTRAVENOUS; SUBCUTANEOUS EVERY 5 MIN PRN
Status: DISCONTINUED | OUTPATIENT
Start: 2017-10-12 | End: 2017-10-12 | Stop reason: HOSPADM

## 2017-10-12 RX ADMIN — EPHEDRINE SULFATE 5 MG: 50 INJECTION, SOLUTION INTRAMUSCULAR; INTRAVENOUS; SUBCUTANEOUS at 12:10

## 2017-10-12 RX ADMIN — HYDROCODONE BITARTRATE AND ACETAMINOPHEN 1 TABLET: 5; 325 TABLET ORAL at 04:10

## 2017-10-12 RX ADMIN — ONDANSETRON 8 MG: 8 TABLET, ORALLY DISINTEGRATING ORAL at 04:10

## 2017-10-12 RX ADMIN — DEXTROSE 3 G: 50 INJECTION, SOLUTION INTRAVENOUS at 10:10

## 2017-10-12 RX ADMIN — VASOPRESSIN 2 UNITS: 20 INJECTION, SOLUTION INTRAMUSCULAR; SUBCUTANEOUS at 10:10

## 2017-10-12 RX ADMIN — FENTANYL CITRATE 150 MCG: 50 INJECTION, SOLUTION INTRAMUSCULAR; INTRAVENOUS at 10:10

## 2017-10-12 RX ADMIN — VASOPRESSIN 1 UNITS: 20 INJECTION, SOLUTION INTRAMUSCULAR; SUBCUTANEOUS at 11:10

## 2017-10-12 RX ADMIN — HEPARIN SODIUM 5000 UNITS: 5000 INJECTION, SOLUTION INTRAVENOUS; SUBCUTANEOUS at 07:10

## 2017-10-12 RX ADMIN — LIDOCAINE HYDROCHLORIDE 100 MG: 20 INJECTION, SOLUTION INTRAVENOUS at 10:10

## 2017-10-12 RX ADMIN — PROPOFOL 150 MG: 10 INJECTION, EMULSION INTRAVENOUS at 10:10

## 2017-10-12 RX ADMIN — PHENYLEPHRINE HYDROCHLORIDE 100 MCG: 10 INJECTION INTRAVENOUS at 10:10

## 2017-10-12 RX ADMIN — SUCCINYLCHOLINE CHLORIDE 120 MG: 20 INJECTION, SOLUTION INTRAMUSCULAR; INTRAVENOUS at 10:10

## 2017-10-12 RX ADMIN — SODIUM CHLORIDE, SODIUM LACTATE, POTASSIUM CHLORIDE, AND CALCIUM CHLORIDE: .6; .31; .03; .02 INJECTION, SOLUTION INTRAVENOUS at 11:10

## 2017-10-12 RX ADMIN — SODIUM CHLORIDE, SODIUM LACTATE, POTASSIUM CHLORIDE, AND CALCIUM CHLORIDE: .6; .31; .03; .02 INJECTION, SOLUTION INTRAVENOUS at 07:10

## 2017-10-12 RX ADMIN — MIDAZOLAM HYDROCHLORIDE 2 MG: 1 INJECTION, SOLUTION INTRAMUSCULAR; INTRAVENOUS at 10:10

## 2017-10-12 RX ADMIN — PHENYLEPHRINE HYDROCHLORIDE 100 MCG: 10 INJECTION INTRAVENOUS at 11:10

## 2017-10-12 RX ADMIN — ONDANSETRON 8 MG: 2 INJECTION, SOLUTION INTRAMUSCULAR; INTRAVENOUS at 01:10

## 2017-10-12 RX ADMIN — ACETAMINOPHEN 1000 MG: 10 INJECTION, SOLUTION INTRAVENOUS at 10:10

## 2017-10-12 RX ADMIN — EPHEDRINE SULFATE 10 MG: 50 INJECTION, SOLUTION INTRAMUSCULAR; INTRAVENOUS; SUBCUTANEOUS at 01:10

## 2017-10-12 RX ADMIN — FENTANYL CITRATE 25 MCG: 50 INJECTION, SOLUTION INTRAMUSCULAR; INTRAVENOUS at 12:10

## 2017-10-12 RX ADMIN — EPHEDRINE SULFATE 15 MG: 50 INJECTION, SOLUTION INTRAMUSCULAR; INTRAVENOUS; SUBCUTANEOUS at 11:10

## 2017-10-12 NOTE — PLAN OF CARE
Received report from Vanita goodwin rn, Patient wanted to go home and started to get dressed but decided she was too tired to leave will watch patient and discharge when more awake.  Nausea noted and zofran given

## 2017-10-12 NOTE — PLAN OF CARE
Patient up to bathroom to urinate with no difficulty. Discharge instructions gone over with patient and family.

## 2017-10-12 NOTE — DISCHARGE INSTRUCTIONS
POSTOPERATIVE INSTRUCTIONS FOLLOWING   MASTECTOMY AND/OR AXILLARY LYMPH NODE DISSECTION    The following are post-operative instructions that will help you to recover from your surgery.  Please read over these instructions carefully and contact us if we can answer any of your questions or concerns.            Activity    You will be able to do much of your own personal care, such as bathing, dressing, preparing simple meals, etc.   A short walk each day will help with your recovery   You may find that you need to take rest breaks between activities, but you should not need to stay in bed for prolonged periods of time during the day. A good rule during this time is to listen to your body, do what is comfortable, and stop and rest when your feel tired.  If it hurts, dont do it.   Return to taking your daily medications as prescribed   Please avoid activities that require moderate to heavy lifting (grocery shopping) or pushing/pulling (vacuuming) and repetitive motions (such as washing windows). Do not lift anything heavier than a gallon of milk.   Following a lymph node dissection, dont avoid using your arm, but dont exercise your arm until after your first post-operative visit.  At your first post-op visit, you will be given arm exercises to regain movement and flexibility.  You may be referred to physical therapy if needed.   You may restart driving when you are no longer on narcotics and you feel safe turning the wheel and stopping quickly.   You will need to be out of work approximately 2-6 weeks depending on your particular surgery and how well you are recovering.  We will evaluate how you are doing at the first post-op appointment.  This is a good time to ask when you may return to work and what activities you may do.    Medication for pain   You will be given a prescription for pain medication. You should not drive or operate machinery while taking these.  Please take prescription pain medication  (narcotics) with food.  Narcotics can cause, or worsen, constipation.  You will need to increase your fluid intake, eat high fiber foods (such as fruits and bran) and make sure that you are up and walking. You may need to take an over the counter stool softener for constipation.   Short term use of an icepack may be helpful to decrease discomfort and swelling, particularly to the armpit after lymph node surgery.   A small pillow positioned in the armpit may also decrease discomfort after lymph node surgery.   If you are given a prescription for antibiotics, take them as prescribed.     Please report the following:   Temperature greater than 101 degrees   Discharge or bad odor from the wound   Excessive bleeding, such as saturated bloody dressing or extreme bruising   Redness at incision and/or drain sites   Swelling or buildup of fluid around incision   Persistent fevers, chills, nausea, vomiting, or diarrhea    Additional information  Your surgeon will see you approximately 2 weeks following your surgery.     If you have any questions or problems, please call my office or my nurse. [phone]    After hours and on weekends, you may call the main [facility] line at [phone]     Lymphedema Risk Reduction    Lymphedema is a swelling of a part of the body, caused by an insufficient lymphatic system and an accumulation of fluid in the bodys tissues.  Lymphedema may occur when normal drainage of fluid is disrupted, such as an infection, injury, cancer, scar tissue, or removal of lymph nodes.    If you had a full axillary lymph node dissection procedure, you may be at greater risk for lymphedema.     For those patients having a sentinel lymph node biopsy, these risks may be smaller and the recommendations are provided for your review and consideration.    The following list contains recommendations for reducing your risk of developing lymphedema.    I. Skin Care--avoid trauma/injury to reduce infection  risk   Keep the hand and arm on the side of surgery clean and dry   Pay attention to nail care and do not cut cuticles   Avoid punctures, such as injections and blood draws from you on the side of your surgery   Wear gloves while doing activities that may cause skin injury (washing dishes, gardening, etc.)   If scratches or punctures occur, wash area with soap and water, and observe for signs of infections (redness, drainage, swelling)   If a rash, itching, redness, pain, increased skin temperatures, fever, or flu-like symptoms occur, contact your physician immediately for early treatment of a possible infection  II. Activity/Lifestyle   Gradually build up the duration and intensity of any activity or exercise   Take frequent rest periods during activity to allow for arm recovery   Monitor your arm and upper body during and after activity for any change in size, shape, tissue, texture, soreness, heaviness, or firmness  III. Avoid constriction of your arm on the side of your surgery   Avoid having blood pressure taken on the arm on the side of your surgery   Wear loose fitting jewelry and clothing   Be careful not to rest a heavy purse, luggage, or grocery bags on that arm   When you return to wearing a bra, make sure that it is well fitted and not too tight  DIET: You may resume your home diet. If nausea is present, increase your diet gradually with fluids and bland foods    ACTIVITY LEVEL: You have received sedation or an anesthetic, you may feel sleepy for several hours. Rest until you are more awake. Gradually resume your normal activities    Medications: Pain medication should be taken only if needed and as directed. If antibiotics are prescribed, the medication should be taken until completed. You will be given an updated list of you medications.    No driving, alcoholic beverages or signing legal documents for next 24 hours or while taking pain medication.       CALL THE DOCTOR:    For any  obvious bleeding (some dried blood over the incision is normal).      Redness, swelling, foul smell around incision or fever over 101.   Shortness of breath, Coughing up Bloody sputum, Pains or Swelling in your Calves .   Persistent pain or nausea not relieved by medication.    If any unusual problems or difficulties occur contact your doctor. If you cannot contact your doctor but feel your signs and symptoms warrant a physicians attention return to the emergency room.

## 2017-10-12 NOTE — INTERVAL H&P NOTE
The patient has been examined and the H&P has been reviewed:    I concur with the findings and no changes have occurred since H&P was written.   Port placement discussed  Consent signed    Anesthesia/Surgery risks, benefits and alternative options discussed and understood by patient/family.          There are no hospital problems to display for this patient.

## 2017-10-12 NOTE — BRIEF OP NOTE
[facility]  Surgery Department  Operative Note    SUMMARY     Date of Procedure: 10/12/2017     Procedure: Procedure(s) (LRB):  LUMPECTOMY-BREAST needle localized with Candia Node Injection (Left)  [id number] (Right)   Candia node biopsy left deep axilla        Surgeon(s) and Role:     * Fadia Wallace MD - Primary    Assisting Surgeon: None    Pre-Operative Diagnosis: Breast cancer, stage 1, left [C50.912]    Post-Operative Diagnosis: Post-Op Diagnosis Codes:     * Breast cancer, stage 1, left [C50.912]    Anesthesia: General    Technical Procedures Used: us fluoro    Description of the Findings of the Procedure: difficult port placement given body habitus; clip within specimen, 1 sentinel node negative    Significant Surgical Tasks Conducted by the Assistant(s), if Applicable:       Complications: No    Estimated Blood Loss (EBL): 25 ml           Implants:   Implant Name Type Inv. Item Serial No.  Lot No. LRB No. Used   PORT POWER CLEAR VIEW - [id number]   PORT POWER CLEAR VIEW   [employer] [id number] Right 1       Specimens:   Specimen (12h ago through future)    Start     Ordered    10/12/17 1229  Specimen to Pathology - Surgery  Once     Comments:  1. Left axillary sentinal node - blue 122      10/12/17 1229    10/12/17 1149  Specimen to Pathology - Surgery  Once     Comments:  1. Left breast Lumpectomy - / lateral orange/ medial red/ superior blue/ inferior green/ anterior yellow/ deep black/  - perm      10/12/17 1156                  Condition: Good    Disposition: PACU - hemodynamically stable.    Attestation: I was present and scrubbed for the entire procedure.

## 2017-10-12 NOTE — H&P (VIEW-ONLY)
History & Physical    SUBJECTIVE:     History of Present Illness:  Pt referred by Dr. Macias for biopsy proven left breast cancer.  She gets her mammograms at [facility] every year since the age of 41.  She has no prior symptoms of breast pain, breast mass, nipple discharge, or skin change.    Menarche occurred at age 14.  Menopause at age 51.  She still has her uterus and ovaries in situ.  She is  with age of first pregnancy at 21.  No history of hormone replacement therapy.  She took oral contraceptive pills for 20-30 years.  No history of biopsy.  She has a maternal aunt with breast cancer and this aunt's daughter which is her first cousin has breast cancer also.    The patient has a history of sickle cell trait with no manifestation.  No history of transfusion.    Her images are not available for interpretation by me today.  However the reports show a 1.1 cm mass in the left breast at 8:00 which is hypoechoic with irregular borders, the axilla was scanned and negative.  This correlates to mammographic abnormality on the left.  A biopsy was performed which shows invasive ductal carcinoma high grade ER + greater than 95%, [place] +93.6%, HER-2 +3+ by immunohistochemistry, Ki-67 greater than 20%, HER-2/bharti fish positive ratio greater than 11.4.  Cc left breast cancer    Review of patient's allergies indicates:  No Known Allergies    Current Outpatient Prescriptions   Medication Sig Dispense Refill    aspirin (ECOTRIN) 81 MG EC tablet Take 81 mg by mouth once daily.      loratadine (CLARITIN) 5 mg chewable tablet Take 5 mg by mouth once daily.      metolazone (ZAROXOLYN) 5 MG tablet 5 mg once daily.       peg 3350-electrolytes-vit C (MOVIPREP) 100-7.5-2.691 gram PwPk Take as directed 1 packet 0    potassium chloride (KLOR-CON) 10 MEQ TbSR 10 mEq 2 (two) times daily.       pravastatin (PRAVACHOL) 40 MG tablet 40 mg 2 (two) times daily.       valsartan-hydrochlorothiazide (DIOVAN-HCT) 160-25 mg per tablet        No  "current facility-administered medications for this visit.        Past Medical History:   Diagnosis Date    Diabetes mellitus     Hyperlipidemia     Hypertension      Past Surgical History:   Procedure Laterality Date    Hammer toe Left      No family history on file.  Social History   Substance Use Topics    Smoking status: Former Smoker     Packs/day: 0.90     Years: 5.00     Types: Cigarettes    Smokeless tobacco: Former User    Alcohol use 0.6 oz/week     1 Shots of liquor per week          Review of Systems   Constitutional: Negative for activity change, appetite change, chills and fever.   HENT: Negative for congestion and sore throat.    Eyes: Negative for photophobia and visual disturbance.   Respiratory: Negative for cough, shortness of breath and wheezing.    Cardiovascular: Negative for chest pain and palpitations.   Gastrointestinal: Negative for abdominal distention and abdominal pain.   Genitourinary: Negative for difficulty urinating, dysuria and frequency.   Musculoskeletal: Negative for gait problem and joint swelling.   Skin: Negative for rash and wound.   Neurological: Negative for dizziness and headaches.   Hematological: Negative for adenopathy. Does not bruise/bleed easily.   Psychiatric/Behavioral: Negative for dysphoric mood and sleep disturbance.       OBJECTIVE:     Vital Signs (Most Recent)  Temp: 97.7 °F (36.5 °C) (09/27/17 1334)  Pulse: 107 (09/27/17 1334)  BP: 139/86 (09/27/17 1334)  5' 1" (1.549 m)  131.5 kg (290 lb 0.2 oz)     Physical Exam   Constitutional: She is oriented to person, place, and time. She appears well-developed and well-nourished. No distress.   HENT:   Head: Normocephalic and atraumatic.   Eyes: Conjunctivae and EOM are normal. No scleral icterus.   Neck: Normal range of motion.   Cardiovascular: Normal rate and intact distal pulses.    Pulmonary/Chest: Effort normal. No stridor. No respiratory distress.   Abdominal: Soft. She exhibits no distension. There is " no tenderness.   Musculoskeletal: Normal range of motion. She exhibits no edema or tenderness.   Neurological: She is alert and oriented to person, place, and time.   Skin: No rash noted. No pallor.   Psychiatric: She has a normal mood and affect. Her behavior is normal.       Laboratory  n/a    Diagnostic Results:  n/a    ASSESSMENT/PLAN:     Assessment:      Clinical Stage IA ([id number]) carcinoma left breast left LIQ.  Triple positive 1.1cm.      Plan:     Options for management were discussed with the patient and her family. We reviewed the existing data noting the equivalency of breast conserving surgery with radiation therapy and mastectomy. We also reviewed the guidelines of the National Comprehensive Cancer Network for Stage I breast carcinoma. We discussed the need for lumpectomy margins to be negative for carcinoma, the necessity for postoperative radiation therapy after breast conservation in most cases, the possibility of a failed or false negative sentinel lymph node biopsy and the potential need for complete lymphadenectomy for a failed or positive sentinel lymph node biopsy were fully discussed. In the setting of mastectomy, delayed or immediate reconstruction options are available and were discussed.      In the setting of lumpectomy, radiation therapy would be recommended majority of the time.  The duration and treatment side effects were discussed with the patient.  This will coordinated with the radiation oncologist pending final pathology.     We also discussed the role of systemic therapy in the treatment of early stage breast cancer.  We discussed that this is based on tumor biology and shalom status and will be determined based on final pathology.  We discussed that if the cancer is hormone positive, endocrine therapy would be recommended in most cases and its use can reduce the risk of recurrence as well as improve survival. Side effects of treatment were briefly discussed. We also discussed the  potential role for chemotherapy based on a number of factors such as tumor phenotype (ER+ vs. triple negative vs. [id number]+) and this would be determined in coordination with the medical oncologist.     The patient, in consultation with her family, has elected to proceed with left partial mastectomy and sentinel lymph node biopsy, Port placement RIGHT. The risks of surgery were described to the patient including bleeding, infection, pain, scarring, wound complications, injury to local structures, breast asymmetry, positive margin, recurrence, and potential need for further surgery. She demonstrated understanding of the risks and consent form signed today.      Patient was educated on breast cancer, receptors, wire localization lumpectomy, mastectomy, sentinel lymph node mapping and biopsy, axillary lymph node dissection, reconstruction, breast prosthesis with post-mastectomy bra and radiation therapy. Patient was given patient information binder including [facility] breast cancer treatment brochure.  All her questions were answered.     Scheduled for 10/12  Referral to hem/onc given her 2 +  Needs cmp, cbc, ekg, cxr pa lat prior to surgery  Ok for aspirins in periop period      Total time spent with the patient: 75 minutes. 50 minutes of face to face consultation and 25 minutes of chart review and coordination of care.

## 2017-10-12 NOTE — TRANSFER OF CARE
"Anesthesia Transfer of Care Note    Patient: Karen Duke    Procedure(s) Performed: Procedure(s) (LRB):  LUMPECTOMY-BREAST needle localized with [place] Node Injection (Left)  [id number] (Right)    Patient location: PACU    Anesthesia Type: general    Transport from OR: Transported from OR on 6-10 L/min O2 by face mask with adequate spontaneous ventilation    Post pain: adequate analgesia    Post assessment: no apparent anesthetic complications    Post vital signs: stable    Level of consciousness: sedated and responds to stimulation    Nausea/Vomiting: no nausea/vomiting    Complications: none    Transfer of care protocol was followed      Last vitals:   Visit Vitals  /61 (BP Location: Right arm, Patient Position: Lying)   Pulse 110   Temp 36.6 °C (97.8 °F) (Oral)   Resp 18   Ht 5' 1" (1.549 m)   Wt 130.2 kg (287 lb)   SpO2 96%   Breastfeeding? No   BMI 54.23 kg/m²     "

## 2017-10-12 NOTE — TELEPHONE ENCOUNTER
Left message    ----- Message from Zane Mancuso sent at 10/12/2017 10:58 AM CDT -----  Contact: daughter/Bita  [phone]  Pt daughter requesting to speak with the nurse concerning the pt treatment plan.  Please call and advise

## 2017-10-13 ENCOUNTER — TELEPHONE (OUTPATIENT)
Dept: SURGERY | Facility: CLINIC | Age: 72
End: 2017-10-13

## 2017-10-13 ENCOUNTER — TELEPHONE (OUTPATIENT)
Dept: HEMATOLOGY/ONCOLOGY | Facility: CLINIC | Age: 72
End: 2017-10-13

## 2017-10-13 NOTE — ANESTHESIA POSTPROCEDURE EVALUATION
"Anesthesia Post Evaluation    Patient: Karen Duke    Procedure(s) Performed: Procedure(s) (LRB):  LUMPECTOMY-BREAST needle localized with [place] Node Injection (Left)  [id number] (Right)    Final Anesthesia Type: general  Patient location during evaluation: PACU  Patient participation: Yes- Able to Participate  Level of consciousness: awake and alert  Post-procedure vital signs: reviewed and stable  Pain management: adequate  Airway patency: patent  PONV status at discharge: No PONV  Anesthetic complications: no      Cardiovascular status: blood pressure returned to baseline  Respiratory status: unassisted  Hydration status: euvolemic  Follow-up not needed.        Visit Vitals  BP (!) 141/81   Pulse 101   Temp 36.7 °C (98 °F) (Oral)   Resp 17   Ht 5' 1" (1.549 m)   Wt 130.2 kg (287 lb)   SpO2 95%   Breastfeeding? No   BMI 54.23 kg/m²       Pain/Isabell Score: Pain Assessment Performed: Yes (10/12/2017  4:30 PM)  Presence of Pain: complains of pain/discomfort (10/12/2017  4:30 PM)  Pain Rating Prior to Med Admin: 5 (10/12/2017  4:30 PM)  Isabell Score: 10 (10/12/2017  4:30 PM)      "

## 2017-10-13 NOTE — OP NOTE
DATE OF PROCEDURE: 10/12/17     PREOPERATIVE DIAGNOSIS:  Left breast cancer  Triple Positive Body mass index is 54.23 kg/m².       POSTOPERATIVE DIAGNOSIS: Left breast cancer Triple positive Body mass index is 54.23 kg/m².       PROCEDURE:   1. Left breast needle localized lumpectomy  2. Local rearrangement of tissue 5 x 4 x 2 cm  3. Injection of technetium sulfur colloid and isosulfan blue, sentinel mapping  4. Left axillary deep  sentinel lymph node biopsy.  5. Right port placement  Modifier 22     SURGEON: Fadia Wallace M.D.     ASSISTANT: Patrick Mckoy PGY 2     ANESTHESIA: General endotracheal.     PREP: Chlorhexidine.     ESTIMATED BLOOD LOSS: Minimal.     SPECIMENS:  1.  Axillary LN hot 122  2. Left lumpectomy        INDICATIONS: The patient is a 72 y.o. female with biopsy proven breast cancer left breast . She was counseled on her medical and surgical options for treatment. She was highly motivated for breast conservation surgery.  The risks of the procedure were described to the patient including bleeding, infection, pain, scarring, wound complications, injury to local structures, recurrence positive margins and potential need for further surgery. The patient demonstrated understanding of these risks and a consent form was obtained.     PROCEDURE: The patient was identified in the Preoperative Unit and taken back   to the Operating Room and laid supine on the operating room table. IV   antibiotics were administered prior to the induction of general anesthesia.   General anesthesia was induced without complication.   Technetium and isosulfan blue dye was injected into the subareolar space   And breast massage was performed for approximately five minutes. The patient was then   prepped and draped in a standard sterile fashion. Timeout procedure was   performed in accordance with hospital protocol.     We began with the right port placement.   The ultrasound was used to identify the Right internal jugular  vein. The patient was placed into the Trendelenburg position and an 18g needle was used to access the vein with one pass under ultrasound guidance. The ultrasound images were stored and interpreted by me. The wire was advanced without issue under fluoroscopic guidance. The bed was returned to leveled position and we turned our attention to creation of the port pocket in the right upper chest. 5ml of 1% local anesthesia was injected into the right upper chest in the location of the port pocket. A 4cm incision was made using a 15 blade scalpel, and the tissues were dissected using a bovie cautery until the pectoral fascia was identified. A pocket was made in the subcutaneous tissue to accommodate the port. Once we were satisfied with the pocket, the catheter was flushed and then measured using fluoro and then cut to an appropriate length. Using a tunneling device it was passed from the pocket to the neck incision and then connected and secured to the port. The port was then sutured into position using 3.0 vicryl suture. The patient was returned to the Trendelenburg position. Under fluoroscopic guidance the dilator and sheath were placed over the wire using Seldinger technique. The dilator and wire were removed and the catheter was introduced and fed into the sheath as the peel away sheath was removed.  This took considerable time and effort given her BMI of 54 lots of positioning maneuvering was necessary in order to thread catheter forward. An additional 45 minutes of time was necessary for this portion of the procedure given her bmi thus modifier 22 was added to the case.   Fluoro was used to confirm the position of the catheter in the distal SVC and that the catheter did not appear to be kinked at the neck location. The dermis of the port pocket was re-approximated with 4.0 vicryl suture in an interrupted fashion. The skin was re-approximated using 5.0 monocryl suture in a running fashion.        We then turned our  attention to the sentinel node biopsy.  A 4 cm incision was made in the lower third of the hair-bearing portion of the axilla using a 15 blade scalpel. The tissues were dissected until clavipectoral fascia was identified. This was divided and gentle dissection revealed a blue node. This coresponded to high uptake on the gamma probe   This was grasped and elevated into the field and circumferentially dissected, taking care to ligate any feeding vessels and ducts. Once the lymph node was excised, its count was  112. The background count was less than 10. A damp gauze was placed into the axilla and then we turned our attention to the lumpectomy portion.      A periareolar incision was made with a 15 blade scalpel. Breast flaps were created   Toward the wire maintaining the blood supply. Once the wire was in view, it was   delivered into the field and 1.5 cm area of tissue was circumferentially   excised around the wire. Once the specimen was freed, it was taken off the   pectoralis muscle deeply. It was then taken to the back table and painted in   the appropriate orientations. There was no aspect of the wire that was visible   or palpable. The specimen was sent to mammography for confirmation of clipped   and desired lesion. The wound bed was then irrigated and hemostasis was   achieved.      We got confirmation  that the clip was within the specimen. Local rearrangement of tissue was performed for oncoplastic restoration of the  breast contour. Advancement flaps were made subcutaneous 5 x 4 x 2 cm in both superior and inferior directions. These were reapproxiamted in the midline with interrupted 3 vicryl suture with good effect.      Thus, the incisions were closed with dermis reapproximated using 4-0 Vicryl suture in an interrupted   fashion and skin closed using 5-0 Monocryl suture in a running fashion. Dry   sterile dressings were applied.           The patient was awakened from general anesthesia without  complication and returned to the Postoperative Recovery in stable condition. At the end of the case, sponge, instrument and needle counts   were correct on 2 occasions. I was present and scrubbed throughout the entirety  of the case.     COMPLICATIONS: None.     CONDITION: Stable.

## 2017-10-13 NOTE — TELEPHONE ENCOUNTER
----- Message from Mouna Nelson sent at 10/13/2017 12:57 PM CDT -----  Contact: [phone]/Bita pt's daughter   Pt's daughter its requesting to speak with the nurse in regarding pt's post surgery treatments. Please advise

## 2017-10-13 NOTE — TELEPHONE ENCOUNTER
10/13/2017       0953  Attempted to contact pt regarding her post surgery condition. No answer. Phone just rang. No voicemail to leave message.      10/13/2017     1006  Spoke to pt regarding her post surgery condition. Pt stated that she is in pain, about a 5/10. Taking her pain meds.last time she took meds was about 0930 this morning. Pt stated that the med help alleviate her pain. She also said that she tries to take her pain meds every 6 hours. However, she slept through the night so she didn't take any during that time. Pt is taking stools softeners. No fever. bm this morning. Urinating fine. Appetite is okay. Eating light. Informed pt of her follow up appt w/ Dr. Wallace on 10/27/2017 @ 1100. If she has any questions or concerns feel free to contact us. Pt verbalized understanding.

## 2017-10-16 ENCOUNTER — HOSPITAL ENCOUNTER (OUTPATIENT)
Dept: RADIOLOGY | Facility: HOSPITAL | Age: 72
Discharge: HOME | End: 2017-10-16
Attending: NURSE PRACTITIONER
Payer: MEDICARE

## 2017-10-16 DIAGNOSIS — C50.912 CANCER OF LEFT BREAST, STAGE 1, ESTROGEN RECEPTOR POSITIVE: ICD-10-CM

## 2017-10-16 DIAGNOSIS — Z17.0 CANCER OF LEFT BREAST, STAGE 1, ESTROGEN RECEPTOR POSITIVE: ICD-10-CM

## 2017-10-16 PROCEDURE — 71020 XR CHEST PA AND LATERAL PRE-OP: CPT | Mod: 26,,, | Performed by: RADIOLOGY

## 2017-10-16 PROCEDURE — 71020 XR CHEST PA AND LATERAL PRE-OP: CPT | Mod: TC

## 2017-10-17 ENCOUNTER — TELEPHONE (OUTPATIENT)
Dept: SURGERY | Facility: HOSPITAL | Age: 72
End: 2017-10-17

## 2017-10-17 NOTE — TELEPHONE ENCOUNTER
The port bandages could come off post op day 2. So they can come off now. Leave steri strips in place until they fall off on their own.     The xray we obtained was to confirm good position of port after surgery, it looks good.     She can take any over the counter cough medication.

## 2017-10-17 NOTE — TELEPHONE ENCOUNTER
Spoke to pt regarding her post surgery concerns. Informed her (per Dr. Wallace) that she can remove the bandages. Leave the steri strips on until they fall off on their own. The x-ray that was taken looks good. She is okay to take any over the counter medication for her cough. Pt inquired about the itching around the port and if she can put alcohol around it to stop the itching. Pt also wants to know if she can attend a  on Saturday and if she can take Advil for pain. Told pt I would ask Dr. Wallace and give her a call back. Pt verbalized understanding.

## 2017-10-17 NOTE — TELEPHONE ENCOUNTER
----- Message from Casi Chamberlain LPN sent at 10/17/2017  1:40 PM CDT -----      ----- Message -----  From: Ann Vazquez  Sent: 10/17/2017  10:43 AM  To: Rodrigo Loa[facility] Staff    Patient's daughter, Bita, called.   No. [phone]   Patient had surgery on 10/12/17.  The bandage is loose by the port.  The area is itchy.  Also, she has a cough.  What can she take.    Do you have the xray results back from 10/16/17.     Please call.

## 2017-10-24 ENCOUNTER — TELEPHONE (OUTPATIENT)
Dept: SURGERY | Facility: CLINIC | Age: 72
End: 2017-10-24

## 2017-10-24 NOTE — TELEPHONE ENCOUNTER
10/24/17  10:32am    Patient's call returned. Patient informed that she can go to the beauty parlor to have hair washed per Dr. RONNELL Wallace. Patient verbalized understanding. No additional questions/concerns at this time.

## 2017-10-27 ENCOUNTER — OFFICE VISIT (OUTPATIENT)
Dept: SURGERY | Facility: CLINIC | Age: 72
End: 2017-10-27
Payer: MEDICARE

## 2017-10-27 VITALS
WEIGHT: 286.19 LBS | SYSTOLIC BLOOD PRESSURE: 125 MMHG | DIASTOLIC BLOOD PRESSURE: 75 MMHG | HEART RATE: 95 BPM | HEIGHT: 61 IN | TEMPERATURE: 98 F | BODY MASS INDEX: 54.03 KG/M2

## 2017-10-27 DIAGNOSIS — C50.912 CANCER OF LEFT BREAST, STAGE 1, ESTROGEN RECEPTOR POSITIVE: Primary | ICD-10-CM

## 2017-10-27 DIAGNOSIS — Z17.0 CANCER OF LEFT BREAST, STAGE 1, ESTROGEN RECEPTOR POSITIVE: Primary | ICD-10-CM

## 2017-10-27 PROCEDURE — 99024 POSTOP FOLLOW-UP VISIT: CPT | Mod: S$GLB,,, | Performed by: SURGERY

## 2017-10-27 PROCEDURE — 99999 PR PBB SHADOW E&M-EST. PATIENT-LVL III: CPT | Mod: PBBFAC,,, | Performed by: SURGERY

## 2017-10-30 ENCOUNTER — HOSPITAL ENCOUNTER (OUTPATIENT)
Dept: CARDIOLOGY | Facility: HOSPITAL | Age: 72
Discharge: HOME | End: 2017-10-30
Attending: INTERNAL MEDICINE
Payer: MEDICARE

## 2017-10-30 DIAGNOSIS — Z17.0 CANCER OF LEFT BREAST, STAGE 1, ESTROGEN RECEPTOR POSITIVE: ICD-10-CM

## 2017-10-30 DIAGNOSIS — C50.912 CANCER OF LEFT BREAST, STAGE 1, ESTROGEN RECEPTOR POSITIVE: ICD-10-CM

## 2017-10-30 LAB
ESTIMATED PA SYSTOLIC PRESSURE: 27.6
MITRAL VALVE MOBILITY: NORMAL
RETIRED EF AND QEF - SEE NOTES: 55 (ref 55–65)
TRICUSPID VALVE REGURGITATION: NORMAL

## 2017-10-30 PROCEDURE — 93306 TTE W/DOPPLER COMPLETE: CPT

## 2017-10-30 PROCEDURE — 93306 TTE W/DOPPLER COMPLETE: CPT | Mod: 26,,, | Performed by: INTERNAL MEDICINE

## 2017-10-30 NOTE — PROGRESS NOTES
Post op left breast lumpectomy sln and right port    FINAL PATHOLOGIC DIAGNOSIS  1. Left breast lumpectomy:  -Invasive ductal carcinoma, Grade 3 (multifocal)  -DCIS also present  -Biopsy site changes present  -Fibrocystic changes present with microcalcifications  -See [facility] cancer case report below  2. Left axillary sentinel node:  -No evidence of metastatic carcinoma  -Multiple levels examined and immunostains performed    INVASIVE CARCINOMA OF THE BREAST: [facility] CANCER CASE SUMMARY  PROCEDURE: Lumpectomy and sentinel node biopsy  LYMPH NODE SAMPLING: [place] lymph nodes  SPECIMEN LATERALITY: Left  TUMOR SIZE: SIZE OF LARGEST INVASIVE CARCINOMA: 13 mm    HISTOLOGIC TYPE: Invasive mammary carcinoma of no special type (ductal, NOS)  HISTOLOGIC GRADE:  GLANDULAR/TUBULAR DIFFERENTIATION: Score 3  NUCLEAR PLEOMORPHISM: Score 3  MITOTIC RATE: Score 3 (32 mitoses per 10 high power fields)  OVERALL GRADE: Overall grade 3  TUMOR FOCALITY: Multiple foci of invasive carcinoma  Number of foci: 2  Sizes of individual foci: 10 mm and 13 mm  DUCTAL CARCINOMA IN SITU: Present  MARGINS:  Invasive carcinoma: Margins uninvolved by invasive carcinoma  Distance from closest margin: Within 0.2 cm; anterior margin-yellow (1A)  DCIS: Margins uninvolved by DCIS  Distance from closest margin: 0.3 cm; anterior/superior-yellow and blue (1E)  LYMPH NODES:  TOTAL NUMBER OF LYMPH NODES EXAMINED: 1  NUMBER OF SENTINEL NODES EXAMINED: 1  Number of lymph nodes with macro metastases: 0  Number of lymph nodes with micrometastases: 0  Number of lymph nodes with isolated tumor cells: 0  PATHOLOGIC STAGING (pTNM): mpT1c pN0(i-)  ANCILLARY STUDIES: Per History- ER,MS and HER2 positive        Inc c/d/i  Port c/d/i  Margins negative and > 2mm from dcis, nodes neg  To med onc for chemo and eventual radiation  Eventual endocrine therapy

## 2017-10-31 ENCOUNTER — TUMOR BOARD CONFERENCE (OUTPATIENT)
Dept: SURGERY | Facility: CLINIC | Age: 72
End: 2017-10-31

## 2017-10-31 NOTE — PROGRESS NOTES
Interdisciplinary Breast Cancer Conference    Karen Duke    Female    Date Presented to Tumor Board: 10/31/17    HOSPTIAL/CLINIC PRESENTING: [facility]    TUMOR SITE: LEFT    TUMOR SITE: CENTRAL (8:00 middle-inferior depth)    Presenter: Fadia Wallace MD    Reason For Consultation: Initial Presentation    Specialties Present: Medical Oncology;Radiation Oncology;Surgical Oncology;Genetics;Research;Navigation;Pathology;Radiology    Patient Status: a current patient    Treatment to Date: Surgical Intervention(s) (lumpectomy with SLNB)    Clinical Trial Eligibility: None available    Estrogen Receptor Status: Positive    Progesterone Status: Positive    Her2/YANY Status: Positive    PbU7oE4, 1 negative lymph node, invasive cancer 2mm from anterior, DCIS 3mm front anterior/superior margin    RECOMMENDED PLAN: Chemotherapy;Radiation   Taxol/Herceptin x 12 weeks, followed by one year Herceptin  Radiation recommended    UNSTAGEABLE: No         PRESENTATION AT CANCER CONFERENCE: Prospective

## 2017-11-02 ENCOUNTER — OFFICE VISIT (OUTPATIENT)
Dept: HEMATOLOGY/ONCOLOGY | Facility: CLINIC | Age: 72
End: 2017-11-02
Payer: MEDICARE

## 2017-11-02 VITALS
OXYGEN SATURATION: 98 % | HEART RATE: 96 BPM | WEIGHT: 284.38 LBS | BODY MASS INDEX: 53.69 KG/M2 | HEIGHT: 61 IN | SYSTOLIC BLOOD PRESSURE: 111 MMHG | DIASTOLIC BLOOD PRESSURE: 68 MMHG

## 2017-11-02 DIAGNOSIS — Z51.11 ENCOUNTER FOR ANTINEOPLASTIC CHEMOTHERAPY: ICD-10-CM

## 2017-11-02 DIAGNOSIS — C50.912 CANCER OF LEFT BREAST, STAGE 1, ESTROGEN RECEPTOR POSITIVE: Primary | ICD-10-CM

## 2017-11-02 DIAGNOSIS — Z17.0 CANCER OF LEFT BREAST, STAGE 1, ESTROGEN RECEPTOR POSITIVE: Primary | ICD-10-CM

## 2017-11-02 PROCEDURE — 99999 PR PBB SHADOW E&M-EST. PATIENT-LVL II: CPT | Mod: PBBFAC,,, | Performed by: INTERNAL MEDICINE

## 2017-11-02 PROCEDURE — 99215 OFFICE O/P EST HI 40 MIN: CPT | Mod: S$GLB,,, | Performed by: INTERNAL MEDICINE

## 2017-11-02 RX ORDER — SODIUM CHLORIDE 0.9 % (FLUSH) 0.9 %
10 SYRINGE (ML) INJECTION
Status: CANCELLED | OUTPATIENT
Start: 2017-11-30

## 2017-11-02 RX ORDER — HEPARIN 100 UNIT/ML
500 SYRINGE INTRAVENOUS
Status: CANCELLED | OUTPATIENT
Start: 2017-11-30

## 2017-11-02 RX ORDER — EPINEPHRINE 0.3 MG/.3ML
0.3 INJECTION SUBCUTANEOUS ONCE AS NEEDED
Status: CANCELLED | OUTPATIENT
Start: 2017-11-30 | End: 2017-11-02

## 2017-11-02 RX ORDER — FAMOTIDINE 10 MG/ML
20 INJECTION INTRAVENOUS
Status: CANCELLED | OUTPATIENT
Start: 2017-11-30

## 2017-11-02 RX ORDER — DIPHENHYDRAMINE HYDROCHLORIDE 50 MG/ML
50 INJECTION INTRAMUSCULAR; INTRAVENOUS ONCE AS NEEDED
Status: CANCELLED | OUTPATIENT
Start: 2017-11-30 | End: 2017-11-02

## 2017-11-02 NOTE — PROGRESS NOTES
Subjective:       Patient ID: Karen Duke is a 72 y.o. female.    Chief Complaint: No chief complaint on file.    HPI PCP Dr. Sarina Macias.    She has newly diagnosed left breast cancer on routine screening mammogram done in 2017.     A 1.1 centimeter lobulated mass was noticed in the 8:00 position in the left breast.  Images were done at [facility].  Biopsy was done on 17.  It was high-grade invasive ductal carcinoma - ER +96%, MO +94% and HER-2/bharti IHC 3+.  Ki-67 was 37%.     Menarche occurred at age 14.  Menopause at age 51.  She still has her uterus and ovaries in situ.  She is  with age of first pregnancy at 21.  No history of hormone replacement therapy.  She took oral contraceptive pills for 20-30 years.  No history of biopsy.      The patient has a history of sickle cell trait with no manifestation.  No history of transfusion.    Underwent left breast lumpectomy on 10/12/17.    Review of Systems   Constitutional: Negative for appetite change, fatigue, fever and unexpected weight change.   HENT: Negative for facial swelling and nosebleeds.    Eyes: Negative for photophobia and pain.   Respiratory: Negative for cough and shortness of breath.    Cardiovascular: Negative for chest pain and leg swelling.   Gastrointestinal: Negative for abdominal pain, blood in stool and nausea.   Genitourinary: Negative for dysuria and hematuria.   Skin: Negative for color change and rash.   Neurological: Negative for seizures, weakness and headaches.   Hematological: Negative for adenopathy. Does not bruise/bleed easily.   All other systems reviewed and are negative.        Objective:      Physical Exam   Constitutional: She is oriented to person, place, and time. She appears well-developed and well-nourished. No distress.   HENT:   Head: Normocephalic and atraumatic.   Right Ear: Tympanic membrane, external ear and ear canal normal.   Left Ear: Tympanic membrane, external ear and ear canal normal.   Nose:  Nose normal. No mucosal edema, sinus tenderness, septal deviation or nasal septal hematoma. No epistaxis. Right sinus exhibits no maxillary sinus tenderness and no frontal sinus tenderness. Left sinus exhibits no maxillary sinus tenderness and no frontal sinus tenderness.   Mouth/Throat: Oropharynx is clear and moist and mucous membranes are normal. Mucous membranes are not cyanotic. No oral lesions. No dental caries. No oropharyngeal exudate.   Hard and soft palate, tongue and posterior pharyngeal wall unremarkable   Eyes: Conjunctivae and lids are normal. No scleral icterus.   Neck: Trachea normal. Neck supple. No tracheal tenderness present. No tracheal deviation present. No thyroid mass (thyroid is non-tender) present.   Cardiovascular: Normal rate, regular rhythm, normal heart sounds, intact distal pulses and normal pulses.  Exam reveals no gallop.    No murmur heard.  No edema   Pulmonary/Chest: Effort normal and breath sounds normal. No accessory muscle usage or stridor. No respiratory distress. She has no decreased breath sounds. She has no wheezes. She has no rhonchi. She has no rales.       Abdominal: Soft. Bowel sounds are normal. She exhibits no distension and no mass. There is no hepatosplenomegaly, splenomegaly or hepatomegaly. There is no tenderness.   Musculoskeletal: She exhibits no edema or tenderness.   Lymphadenopathy:        Head (right side): No submental and no submandibular adenopathy present.        Head (left side): No submental and no submandibular adenopathy present.     She has no cervical adenopathy.     She has no axillary adenopathy.        Right: No supraclavicular adenopathy present.        Left: No supraclavicular adenopathy present.   Neurological: She is alert and oriented to person, place, and time. She has normal strength. She is not disoriented. No cranial nerve deficit or sensory deficit.   Skin: Skin is warm and intact. No petechiae and no rash noted. She is not diaphoretic.  No cyanosis. No pallor. Nails show no clubbing.   Psychiatric: She has a normal mood and affect. Her speech is normal and behavior is normal. Judgment and thought content normal. Her mood appears not anxious. She expresses no homicidal and no suicidal ideation.         Assessment:       1. Cancer of left breast, stage 1, estrogen receptor positive    2. Encounter for antineoplastic chemotherapy        Plan:   In summary this is a 72-year-old female with Stage I ER/MS/HER-2/bharti positive high-grade invasive ductal carcinoma of the left breast with Ki-67 at 37%.    Lumpectomy specimen showed 2 foci of high grade (grade 3) invasive carcinoma - measuring 10 millimeters and 13 millimeters.    Margins negative.  1 sentinel lymph node removed and it was negative.    Discussed rationale for adjuvant chemotherapy with weekly Taxol/Herceptin ×12 doses.  Provided printed information.  Discussed pros and cons.  Discussed alternatives.  She is agreeable.  Chemotherapy consent signed.    The plan is to do 3 months of Taxol/Herceptin therapy followed by adjuvant radiation therapy followed by 9 months of single agent Herceptin therapy - so asked to complete a total of 1 year of Herceptin.    Gave prescription for Zofran and Decadron as premedication for Taxol.    Her port is well healed.  Will start chemotherapy soon after insurance authorization.    All questions answered.

## 2017-11-03 ENCOUNTER — TELEPHONE (OUTPATIENT)
Dept: HEMATOLOGY/ONCOLOGY | Facility: CLINIC | Age: 72
End: 2017-11-03

## 2017-11-03 DIAGNOSIS — C50.912 CANCER OF LEFT BREAST, STAGE 1, ESTROGEN RECEPTOR POSITIVE: Primary | ICD-10-CM

## 2017-11-03 DIAGNOSIS — Z17.0 CANCER OF LEFT BREAST, STAGE 1, ESTROGEN RECEPTOR POSITIVE: Primary | ICD-10-CM

## 2017-11-10 ENCOUNTER — TELEPHONE (OUTPATIENT)
Dept: HEMATOLOGY/ONCOLOGY | Facility: CLINIC | Age: 72
End: 2017-11-10

## 2017-11-10 DIAGNOSIS — C50.912 CANCER OF LEFT BREAST, STAGE 1, ESTROGEN RECEPTOR POSITIVE: Primary | ICD-10-CM

## 2017-11-10 DIAGNOSIS — Z17.0 CANCER OF LEFT BREAST, STAGE 1, ESTROGEN RECEPTOR POSITIVE: Primary | ICD-10-CM

## 2017-11-15 ENCOUNTER — OFFICE VISIT (OUTPATIENT)
Dept: HEMATOLOGY/ONCOLOGY | Facility: CLINIC | Age: 72
End: 2017-11-15
Payer: MEDICARE

## 2017-11-15 ENCOUNTER — LAB VISIT (OUTPATIENT)
Dept: LAB | Facility: HOSPITAL | Age: 72
End: 2017-11-15
Attending: INTERNAL MEDICINE
Payer: MEDICARE

## 2017-11-15 VITALS
BODY MASS INDEX: 52.66 KG/M2 | TEMPERATURE: 98 F | HEART RATE: 111 BPM | SYSTOLIC BLOOD PRESSURE: 121 MMHG | WEIGHT: 286.19 LBS | HEIGHT: 62 IN | DIASTOLIC BLOOD PRESSURE: 73 MMHG | OXYGEN SATURATION: 97 %

## 2017-11-15 DIAGNOSIS — Z17.0 CANCER OF LEFT BREAST, STAGE 1, ESTROGEN RECEPTOR POSITIVE: Primary | ICD-10-CM

## 2017-11-15 DIAGNOSIS — Z17.0 CANCER OF LEFT BREAST, STAGE 1, ESTROGEN RECEPTOR POSITIVE: ICD-10-CM

## 2017-11-15 DIAGNOSIS — Z51.11 ENCOUNTER FOR ANTINEOPLASTIC CHEMOTHERAPY: ICD-10-CM

## 2017-11-15 DIAGNOSIS — C50.912 CANCER OF LEFT BREAST, STAGE 1, ESTROGEN RECEPTOR POSITIVE: Primary | ICD-10-CM

## 2017-11-15 DIAGNOSIS — C50.912 CANCER OF LEFT BREAST, STAGE 1, ESTROGEN RECEPTOR POSITIVE: ICD-10-CM

## 2017-11-15 LAB
ALBUMIN SERPL BCP-MCNC: 3.4 G/DL
ALP SERPL-CCNC: 55 U/L
ALT SERPL W/O P-5'-P-CCNC: 12 U/L
ANION GAP SERPL CALC-SCNC: 10 MMOL/L
AST SERPL-CCNC: 16 U/L
BASOPHILS # BLD AUTO: 0.03 K/UL
BASOPHILS NFR BLD: 0.4 %
BILIRUB SERPL-MCNC: 0.2 MG/DL
BUN SERPL-MCNC: 13 MG/DL
CALCIUM SERPL-MCNC: 9.1 MG/DL
CHLORIDE SERPL-SCNC: 96 MMOL/L
CO2 SERPL-SCNC: 36 MMOL/L
CREAT SERPL-MCNC: 0.8 MG/DL
DIFFERENTIAL METHOD: ABNORMAL
EOSINOPHIL # BLD AUTO: 0.2 K/UL
EOSINOPHIL NFR BLD: 2.3 %
ERYTHROCYTE [DISTWIDTH] IN BLOOD BY AUTOMATED COUNT: 17.4 %
EST. GFR  (AFRICAN AMERICAN): >60 ML/MIN/1.73 M^2
EST. GFR  (NON AFRICAN AMERICAN): >60 ML/MIN/1.73 M^2
GLUCOSE SERPL-MCNC: 132 MG/DL
HCT VFR BLD AUTO: 37.6 %
HGB BLD-MCNC: 12 G/DL
LYMPHOCYTES # BLD AUTO: 4 K/UL
LYMPHOCYTES NFR BLD: 47.8 %
MCH RBC QN AUTO: 23.6 PG
MCHC RBC AUTO-ENTMCNC: 31.9 G/DL
MCV RBC AUTO: 74 FL
MONOCYTES # BLD AUTO: 0.5 K/UL
MONOCYTES NFR BLD: 6.4 %
NEUTROPHILS # BLD AUTO: 3.6 K/UL
NEUTROPHILS NFR BLD: 43.1 %
PLATELET # BLD AUTO: 285 K/UL
PMV BLD AUTO: 10.4 FL
POTASSIUM SERPL-SCNC: 2.8 MMOL/L
PROT SERPL-MCNC: 7.6 G/DL
RBC # BLD AUTO: 5.08 M/UL
SODIUM SERPL-SCNC: 142 MMOL/L
WBC # BLD AUTO: 8.29 K/UL

## 2017-11-15 PROCEDURE — 80053 COMPREHEN METABOLIC PANEL: CPT

## 2017-11-15 PROCEDURE — 99214 OFFICE O/P EST MOD 30 MIN: CPT | Mod: S$GLB,,, | Performed by: INTERNAL MEDICINE

## 2017-11-15 PROCEDURE — 85025 COMPLETE CBC W/AUTO DIFF WBC: CPT

## 2017-11-15 PROCEDURE — 36415 COLL VENOUS BLD VENIPUNCTURE: CPT

## 2017-11-15 PROCEDURE — 99999 PR PBB SHADOW E&M-EST. PATIENT-LVL III: CPT | Mod: PBBFAC,,, | Performed by: INTERNAL MEDICINE

## 2017-11-15 RX ORDER — DEXAMETHASONE 4 MG/1
TABLET ORAL
Qty: 30 TABLET | Refills: 0 | Status: SHIPPED | OUTPATIENT
Start: 2017-11-15 | End: 2018-05-28

## 2017-11-15 NOTE — PROGRESS NOTES
Subjective:       Patient ID: Karen Duke is a 72 y.o. female.    Chief Complaint: No chief complaint on file.    HPI PCP Dr. Sarina Macias.    She has newly diagnosed left breast cancer on routine screening mammogram done in 2017.     A 1.1 centimeter lobulated mass was noticed in the 8:00 position in the left breast.  Images were done at [facility].  Biopsy was done on 17.  It was high-grade invasive ductal carcinoma - ER +96%, GA +94% and HER-2/bharti IHC 3+.  Ki-67 was 37%.     Menarche occurred at age 14.  Menopause at age 51.  She still has her uterus and ovaries in situ.  She is  with age of first pregnancy at 21.  No history of hormone replacement therapy.  She took oral contraceptive pills for 20-30 years.  No history of biopsy.      The patient has a history of sickle cell trait with no manifestation.  No history of transfusion.    Underwent left breast lumpectomy on 10/12/17.    Lumpectomy specimen showed 2 foci of high grade (grade 3) invasive carcinoma - measuring 10 millimeters and 13 millimeters. Margins negative.  1 sentinel lymph node removed and it was negative.    Review of Systems   Constitutional: Negative for appetite change, fatigue, fever and unexpected weight change.   HENT: Negative for facial swelling and nosebleeds.    Eyes: Negative for photophobia and pain.   Respiratory: Negative for cough and shortness of breath.    Cardiovascular: Negative for chest pain and leg swelling.   Gastrointestinal: Negative for abdominal pain, blood in stool and nausea.   Genitourinary: Negative for dysuria and hematuria.   Skin: Negative for color change and rash.   Neurological: Negative for seizures, weakness and headaches.   Hematological: Negative for adenopathy. Does not bruise/bleed easily.   All other systems reviewed and are negative.        Objective:      Physical Exam   Constitutional: She is oriented to person, place, and time. She appears well-developed and well-nourished. No  distress.   HENT:   Head: Normocephalic and atraumatic.   Right Ear: Tympanic membrane, external ear and ear canal normal.   Left Ear: Tympanic membrane, external ear and ear canal normal.   Nose: Nose normal. No mucosal edema, sinus tenderness, septal deviation or nasal septal hematoma. No epistaxis. Right sinus exhibits no maxillary sinus tenderness and no frontal sinus tenderness. Left sinus exhibits no maxillary sinus tenderness and no frontal sinus tenderness.   Mouth/Throat: Oropharynx is clear and moist and mucous membranes are normal. Mucous membranes are not cyanotic. No oral lesions. No dental caries. No oropharyngeal exudate.   Hard and soft palate, tongue and posterior pharyngeal wall unremarkable   Eyes: Conjunctivae and lids are normal. No scleral icterus.   Neck: Trachea normal. Neck supple. No tracheal tenderness present. No tracheal deviation present. No thyroid mass (thyroid is non-tender) present.   Cardiovascular: Normal rate, regular rhythm, normal heart sounds, intact distal pulses and normal pulses.  Exam reveals no gallop.    No murmur heard.  No edema   Pulmonary/Chest: Effort normal and breath sounds normal. No accessory muscle usage or stridor. No respiratory distress. She has no decreased breath sounds. She has no wheezes. She has no rhonchi. She has no rales.       Abdominal: Soft. Bowel sounds are normal. She exhibits no distension and no mass. There is no hepatosplenomegaly, splenomegaly or hepatomegaly. There is no tenderness.   Musculoskeletal: She exhibits no edema or tenderness.   Lymphadenopathy:        Head (right side): No submental and no submandibular adenopathy present.        Head (left side): No submental and no submandibular adenopathy present.     She has no cervical adenopathy.     She has no axillary adenopathy.        Right: No supraclavicular adenopathy present.        Left: No supraclavicular adenopathy present.   Neurological: She is alert and oriented to person,  place, and time. She has normal strength. She is not disoriented. No cranial nerve deficit or sensory deficit.   Skin: Skin is warm and intact. No petechiae and no rash noted. She is not diaphoretic. No cyanosis. No pallor. Nails show no clubbing.   Psychiatric: She has a normal mood and affect. Her speech is normal and behavior is normal. Judgment and thought content normal. Her mood appears not anxious. She expresses no homicidal and no suicidal ideation.         Assessment:       1. Cancer of left breast, stage 1, estrogen receptor positive    2. Encounter for antineoplastic chemotherapy        Plan:   In summary this is a 72-year-old female with Stage I ER/IL/HER-2/bharti positive high-grade invasive ductal carcinoma of the left breast with Ki-67 at 37%.    The plan is to do 3 months of Taxol/Herceptin therapy followed by adjuvant radiation therapy followed by 9 months of single agent Herceptin therapy - so asked to complete a total of 1 year of Herceptin.    Gave prescription for Zofran and Decadron as premedication for Taxol.    Plan to start first dose of Taxol/Herceptin tomorrow.    Hypokalemia noted.  Potassium 2.8.  She is taking 10 mEq twice a day - will increase to 20 mEq twice a day.    All questions answered concerning chemotherapy.

## 2017-11-16 ENCOUNTER — TELEPHONE (OUTPATIENT)
Dept: INFUSION THERAPY | Facility: HOSPITAL | Age: 72
End: 2017-11-16

## 2017-11-16 NOTE — TELEPHONE ENCOUNTER
Per Dr. Wilkins it is okay to start chemo 11/30/17 at patient's request since auth remains pending. I called pt and told her 11/30/2017 was ok to start, reminded her to take her steroid the night before, and that she did not have to get new labs and see Dr. Wilkins before starting. She verbalized understanding.

## 2017-11-17 ENCOUNTER — TELEPHONE (OUTPATIENT)
Dept: SURGERY | Facility: CLINIC | Age: 72
End: 2017-11-17

## 2017-11-17 NOTE — TELEPHONE ENCOUNTER
----- Message from Mickie Vanegas sent at 11/17/2017  8:34 AM CST -----  Contact: ms gaston with dr knapp office [phone]  Please call asap regarding patient, regarding a pre-op ekg abnormal    11/17/17     0847  Attempted to contact Prisca twice. No answer. Left voicemail to return call to the office.

## 2017-11-28 ENCOUNTER — OFFICE VISIT (OUTPATIENT)
Dept: SURGERY | Facility: CLINIC | Age: 72
End: 2017-11-28
Payer: MEDICARE

## 2017-11-28 VITALS
SYSTOLIC BLOOD PRESSURE: 117 MMHG | BODY MASS INDEX: 52.56 KG/M2 | WEIGHT: 285.63 LBS | HEART RATE: 108 BPM | HEIGHT: 62 IN | TEMPERATURE: 98 F | DIASTOLIC BLOOD PRESSURE: 74 MMHG

## 2017-11-28 DIAGNOSIS — Z17.0 CANCER OF LEFT BREAST, STAGE 1, ESTROGEN RECEPTOR POSITIVE: Primary | ICD-10-CM

## 2017-11-28 DIAGNOSIS — C50.912 CANCER OF LEFT BREAST, STAGE 1, ESTROGEN RECEPTOR POSITIVE: Primary | ICD-10-CM

## 2017-11-28 PROCEDURE — 99999 PR PBB SHADOW E&M-EST. PATIENT-LVL III: CPT | Mod: PBBFAC,,, | Performed by: SURGERY

## 2017-11-28 PROCEDURE — 99024 POSTOP FOLLOW-UP VISIT: CPT | Mod: S$GLB,,, | Performed by: SURGERY

## 2017-11-28 NOTE — PROGRESS NOTES
Post op left breast lumpectomy sln and right port    FINAL PATHOLOGIC DIAGNOSIS  1. Left breast lumpectomy:  -Invasive ductal carcinoma, Grade 3 (multifocal)  -DCIS also present  -Biopsy site changes present  -Fibrocystic changes present with microcalcifications  -See [facility] cancer case report below  2. Left axillary sentinel node:  -No evidence of metastatic carcinoma  -Multiple levels examined and immunostains performed    INVASIVE CARCINOMA OF THE BREAST: [facility] CANCER CASE SUMMARY  PROCEDURE: Lumpectomy and sentinel node biopsy  LYMPH NODE SAMPLING: [place] lymph nodes  SPECIMEN LATERALITY: Left  TUMOR SIZE: SIZE OF LARGEST INVASIVE CARCINOMA: 13 mm    HISTOLOGIC TYPE: Invasive mammary carcinoma of no special type (ductal, NOS)  HISTOLOGIC GRADE:  GLANDULAR/TUBULAR DIFFERENTIATION: Score 3  NUCLEAR PLEOMORPHISM: Score 3  MITOTIC RATE: Score 3 (32 mitoses per 10 high power fields)  OVERALL GRADE: Overall grade 3  TUMOR FOCALITY: Multiple foci of invasive carcinoma  Number of foci: 2  Sizes of individual foci: 10 mm and 13 mm  DUCTAL CARCINOMA IN SITU: Present  MARGINS:  Invasive carcinoma: Margins uninvolved by invasive carcinoma  Distance from closest margin: Within 0.2 cm; anterior margin-yellow (1A)  DCIS: Margins uninvolved by DCIS  Distance from closest margin: 0.3 cm; anterior/superior-yellow and blue (1E)  LYMPH NODES:  TOTAL NUMBER OF LYMPH NODES EXAMINED: 1  NUMBER OF SENTINEL NODES EXAMINED: 1  Number of lymph nodes with macro metastases: 0  Number of lymph nodes with micrometastases: 0  Number of lymph nodes with isolated tumor cells: 0  PATHOLOGIC STAGING (pTNM): mpT1c pN0(i-)  ANCILLARY STUDIES: Per History- ER,[place] and HER2 positive        Inc c/d/i  Port c/d/i  Margins negative and > 2mm from dcis, nodes neg  To med onc for chemo and eventual radiation  Eventual endocrine therapy  F/up 6 mo

## 2017-11-29 ENCOUNTER — LAB VISIT (OUTPATIENT)
Dept: LAB | Facility: HOSPITAL | Age: 72
End: 2017-11-29
Attending: INTERNAL MEDICINE
Payer: MEDICARE

## 2017-11-29 ENCOUNTER — OFFICE VISIT (OUTPATIENT)
Dept: HEMATOLOGY/ONCOLOGY | Facility: CLINIC | Age: 72
End: 2017-11-29
Payer: MEDICARE

## 2017-11-29 VITALS
SYSTOLIC BLOOD PRESSURE: 134 MMHG | OXYGEN SATURATION: 96 % | HEIGHT: 61 IN | BODY MASS INDEX: 53.86 KG/M2 | WEIGHT: 285.25 LBS | DIASTOLIC BLOOD PRESSURE: 67 MMHG | TEMPERATURE: 98 F | HEART RATE: 98 BPM

## 2017-11-29 DIAGNOSIS — Z17.0 CANCER OF LEFT BREAST, STAGE 1, ESTROGEN RECEPTOR POSITIVE: ICD-10-CM

## 2017-11-29 DIAGNOSIS — E87.6 HYPOKALEMIA: ICD-10-CM

## 2017-11-29 DIAGNOSIS — Z17.0 CANCER OF LEFT BREAST, STAGE 1, ESTROGEN RECEPTOR POSITIVE: Primary | ICD-10-CM

## 2017-11-29 DIAGNOSIS — C50.912 CANCER OF LEFT BREAST, STAGE 1, ESTROGEN RECEPTOR POSITIVE: Primary | ICD-10-CM

## 2017-11-29 DIAGNOSIS — Z51.11 ENCOUNTER FOR ANTINEOPLASTIC CHEMOTHERAPY: ICD-10-CM

## 2017-11-29 DIAGNOSIS — C50.912 CANCER OF LEFT BREAST, STAGE 1, ESTROGEN RECEPTOR POSITIVE: ICD-10-CM

## 2017-11-29 LAB
ALBUMIN SERPL BCP-MCNC: 3.2 G/DL
ALP SERPL-CCNC: 55 U/L
ALT SERPL W/O P-5'-P-CCNC: 12 U/L
ANION GAP SERPL CALC-SCNC: 10 MMOL/L
AST SERPL-CCNC: 14 U/L
BASOPHILS # BLD AUTO: 0.03 K/UL
BASOPHILS NFR BLD: 0.3 %
BILIRUB SERPL-MCNC: 0.4 MG/DL
BUN SERPL-MCNC: 13 MG/DL
CALCIUM SERPL-MCNC: 9.4 MG/DL
CHLORIDE SERPL-SCNC: 97 MMOL/L
CO2 SERPL-SCNC: 35 MMOL/L
CREAT SERPL-MCNC: 0.8 MG/DL
DIFFERENTIAL METHOD: ABNORMAL
EOSINOPHIL # BLD AUTO: 0.2 K/UL
EOSINOPHIL NFR BLD: 1.7 %
ERYTHROCYTE [DISTWIDTH] IN BLOOD BY AUTOMATED COUNT: 16.9 %
EST. GFR  (AFRICAN AMERICAN): >60 ML/MIN/1.73 M^2
EST. GFR  (NON AFRICAN AMERICAN): >60 ML/MIN/1.73 M^2
GLUCOSE SERPL-MCNC: 143 MG/DL
HCT VFR BLD AUTO: 36.3 %
HGB BLD-MCNC: 11.4 G/DL
LYMPHOCYTES # BLD AUTO: 3.8 K/UL
LYMPHOCYTES NFR BLD: 40.8 %
MCH RBC QN AUTO: 23.6 PG
MCHC RBC AUTO-ENTMCNC: 31.4 G/DL
MCV RBC AUTO: 75 FL
MONOCYTES # BLD AUTO: 0.4 K/UL
MONOCYTES NFR BLD: 4.1 %
NEUTROPHILS # BLD AUTO: 4.9 K/UL
NEUTROPHILS NFR BLD: 52.9 %
PLATELET # BLD AUTO: 289 K/UL
PMV BLD AUTO: 9.7 FL
POTASSIUM SERPL-SCNC: 3.5 MMOL/L
PROT SERPL-MCNC: 7.3 G/DL
RBC # BLD AUTO: 4.84 M/UL
SODIUM SERPL-SCNC: 142 MMOL/L
WBC # BLD AUTO: 9.21 K/UL

## 2017-11-29 PROCEDURE — 99499 UNLISTED E&M SERVICE: CPT | Mod: S$GLB,,, | Performed by: INTERNAL MEDICINE

## 2017-11-29 PROCEDURE — 85025 COMPLETE CBC W/AUTO DIFF WBC: CPT

## 2017-11-29 PROCEDURE — 80053 COMPREHEN METABOLIC PANEL: CPT

## 2017-11-29 PROCEDURE — 36415 COLL VENOUS BLD VENIPUNCTURE: CPT

## 2017-11-29 PROCEDURE — 99999 PR PBB SHADOW E&M-EST. PATIENT-LVL III: CPT | Mod: PBBFAC,,, | Performed by: INTERNAL MEDICINE

## 2017-11-29 PROCEDURE — 99213 OFFICE O/P EST LOW 20 MIN: CPT | Mod: S$GLB,,, | Performed by: INTERNAL MEDICINE

## 2017-11-29 NOTE — PROGRESS NOTES
Patient, Karen Duke (MRN #[id number]), presented with a recorded BMI of 53.9 kg/m^2 consistent with the definition of morbid obesity (ICD-10 E66.01). The patient's morbid obesity was monitored, evaluated, addressed and/or treated. This addendum to the medical record is made on 11/29/2017.

## 2017-11-29 NOTE — PROGRESS NOTES
Subjective:       Patient ID: Karen Duke is a 72 y.o. female.    Chief Complaint: Breast Cancer    HPI PCP Dr. Sarina Macias.    She has newly diagnosed left breast cancer on routine screening mammogram done in 2017.     A 1.1 centimeter lobulated mass was noticed in the 8:00 position in the left breast.  Images were done at [facility].  Biopsy was done on 17.  It was high-grade invasive ductal carcinoma - ER +96%, GA +94% and HER-2/bharti IHC 3+.  Ki-67 was 37%.     Menarche occurred at age 14.  Menopause at age 51.  She still has her uterus and ovaries in situ.  She is  with age of first pregnancy at 21.  No history of hormone replacement therapy.  She took oral contraceptive pills for 20-30 years.  No history of biopsy.      The patient has a history of sickle cell trait with no manifestation.  No history of transfusion.    Underwent left breast lumpectomy on 10/12/17.    Lumpectomy specimen showed 2 foci of high grade (grade 3) invasive carcinoma - measuring 10 millimeters and 13 millimeters. Margins negative.  1 sentinel lymph node removed and it was negative.    Review of Systems   Constitutional: Negative for appetite change, fatigue, fever and unexpected weight change.   HENT: Negative for facial swelling and nosebleeds.    Eyes: Negative for photophobia and pain.   Respiratory: Negative for cough and shortness of breath.    Cardiovascular: Negative for chest pain and leg swelling.   Gastrointestinal: Negative for abdominal pain, blood in stool and nausea.   Genitourinary: Negative for dysuria and hematuria.   Skin: Negative for color change and rash.   Neurological: Negative for seizures, weakness and headaches.   Hematological: Negative for adenopathy. Does not bruise/bleed easily.   All other systems reviewed and are negative.        Objective:      Physical Exam   Constitutional: She is oriented to person, place, and time. She appears well-developed and well-nourished. No distress.   HENT:    Head: Normocephalic and atraumatic.   Right Ear: Tympanic membrane, external ear and ear canal normal.   Left Ear: Tympanic membrane, external ear and ear canal normal.   Nose: Nose normal. No mucosal edema, sinus tenderness, septal deviation or nasal septal hematoma. No epistaxis. Right sinus exhibits no maxillary sinus tenderness and no frontal sinus tenderness. Left sinus exhibits no maxillary sinus tenderness and no frontal sinus tenderness.   Mouth/Throat: Oropharynx is clear and moist and mucous membranes are normal. Mucous membranes are not cyanotic. No oral lesions. No dental caries. No oropharyngeal exudate.   Hard and soft palate, tongue and posterior pharyngeal wall unremarkable   Eyes: Conjunctivae and lids are normal. No scleral icterus.   Neck: Trachea normal. Neck supple. No tracheal tenderness present. No tracheal deviation present. No thyroid mass (thyroid is non-tender) present.   Cardiovascular: Normal rate, regular rhythm, normal heart sounds, intact distal pulses and normal pulses.  Exam reveals no gallop.    No murmur heard.  No edema   Pulmonary/Chest: Effort normal and breath sounds normal. No accessory muscle usage or stridor. No respiratory distress. She has no decreased breath sounds. She has no wheezes. She has no rhonchi. She has no rales.       Abdominal: Soft. Bowel sounds are normal. She exhibits no distension and no mass. There is no hepatosplenomegaly, splenomegaly or hepatomegaly. There is no tenderness.   Musculoskeletal: She exhibits no edema or tenderness.   Lymphadenopathy:        Head (right side): No submental and no submandibular adenopathy present.        Head (left side): No submental and no submandibular adenopathy present.     She has no cervical adenopathy.     She has no axillary adenopathy.        Right: No supraclavicular adenopathy present.        Left: No supraclavicular adenopathy present.   Neurological: She is alert and oriented to person, place, and time. She  has normal strength. She is not disoriented. No cranial nerve deficit or sensory deficit.   Skin: Skin is warm and intact. No petechiae and no rash noted. She is not diaphoretic. No cyanosis. No pallor. Nails show no clubbing.   Psychiatric: She has a normal mood and affect. Her speech is normal and behavior is normal. Judgment and thought content normal. Her mood appears not anxious. She expresses no homicidal and no suicidal ideation.         Assessment:       1. Cancer of left breast, stage 1, estrogen receptor positive    2. Encounter for antineoplastic chemotherapy    3. Hypokalemia        Plan:   In summary this is a 72-year-old female with Stage I ER/ID/HER-2/bharti positive high-grade invasive ductal carcinoma of the left breast with Ki-67 at 37%.    The plan is to do 3 months of Taxol/Herceptin therapy followed by adjuvant radiation therapy followed by 9 months of single agent Herceptin therapy - so asked to complete a total of 1 year of Herceptin.    Plan to start first dose of Taxol/Herceptin tomorrow.  Chemotherapy delayed secondary to Thanksgiving holiday and patient was out of town.    Hypokalemia resolved.Continue KCl 20 mEq twice a day.

## 2017-11-30 ENCOUNTER — INFUSION (OUTPATIENT)
Dept: INFUSION THERAPY | Facility: HOSPITAL | Age: 72
End: 2017-11-30
Attending: INTERNAL MEDICINE
Payer: MEDICARE

## 2017-11-30 VITALS
SYSTOLIC BLOOD PRESSURE: 128 MMHG | RESPIRATION RATE: 18 BRPM | HEART RATE: 104 BPM | TEMPERATURE: 98 F | DIASTOLIC BLOOD PRESSURE: 59 MMHG

## 2017-11-30 DIAGNOSIS — C50.912 CANCER OF LEFT BREAST, STAGE 1, ESTROGEN RECEPTOR POSITIVE: ICD-10-CM

## 2017-11-30 DIAGNOSIS — Z51.11 ENCOUNTER FOR ANTINEOPLASTIC CHEMOTHERAPY: Primary | ICD-10-CM

## 2017-11-30 DIAGNOSIS — Z17.0 CANCER OF LEFT BREAST, STAGE 1, ESTROGEN RECEPTOR POSITIVE: ICD-10-CM

## 2017-11-30 PROCEDURE — 96367 TX/PROPH/DG ADDL SEQ IV INF: CPT

## 2017-11-30 PROCEDURE — 96413 CHEMO IV INFUSION 1 HR: CPT

## 2017-11-30 PROCEDURE — 96415 CHEMO IV INFUSION ADDL HR: CPT

## 2017-11-30 PROCEDURE — 96375 TX/PRO/DX INJ NEW DRUG ADDON: CPT

## 2017-11-30 PROCEDURE — 96417 CHEMO IV INFUS EACH ADDL SEQ: CPT

## 2017-11-30 PROCEDURE — 25000003 PHARM REV CODE 250: Performed by: INTERNAL MEDICINE

## 2017-11-30 PROCEDURE — A4216 STERILE WATER/SALINE, 10 ML: HCPCS | Performed by: INTERNAL MEDICINE

## 2017-11-30 PROCEDURE — 63600175 PHARM REV CODE 636 W HCPCS: Performed by: INTERNAL MEDICINE

## 2017-11-30 PROCEDURE — S0028 INJECTION, FAMOTIDINE, 20 MG: HCPCS | Performed by: INTERNAL MEDICINE

## 2017-11-30 RX ORDER — HEPARIN 100 UNIT/ML
500 SYRINGE INTRAVENOUS
Status: DISCONTINUED | OUTPATIENT
Start: 2017-11-30 | End: 2017-11-30 | Stop reason: HOSPADM

## 2017-11-30 RX ORDER — FAMOTIDINE 10 MG/ML
20 INJECTION INTRAVENOUS
Status: COMPLETED | OUTPATIENT
Start: 2017-11-30 | End: 2017-11-30

## 2017-11-30 RX ORDER — DIPHENHYDRAMINE HYDROCHLORIDE 50 MG/ML
50 INJECTION INTRAMUSCULAR; INTRAVENOUS ONCE AS NEEDED
Status: DISCONTINUED | OUTPATIENT
Start: 2017-11-30 | End: 2017-11-30 | Stop reason: HOSPADM

## 2017-11-30 RX ORDER — EPINEPHRINE 0.3 MG/.3ML
0.3 INJECTION SUBCUTANEOUS ONCE AS NEEDED
Status: DISCONTINUED | OUTPATIENT
Start: 2017-11-30 | End: 2017-11-30 | Stop reason: HOSPADM

## 2017-11-30 RX ORDER — SODIUM CHLORIDE 0.9 % (FLUSH) 0.9 %
10 SYRINGE (ML) INJECTION
Status: DISCONTINUED | OUTPATIENT
Start: 2017-11-30 | End: 2017-11-30 | Stop reason: HOSPADM

## 2017-11-30 RX ADMIN — SODIUM CHLORIDE, PRESERVATIVE FREE 10 ML: 5 INJECTION INTRAVENOUS at 01:11

## 2017-11-30 RX ADMIN — FAMOTIDINE 20 MG: 10 INJECTION INTRAVENOUS at 09:11

## 2017-11-30 RX ADMIN — DIPHENHYDRAMINE HYDROCHLORIDE 25 MG: 50 INJECTION INTRAMUSCULAR; INTRAVENOUS at 09:11

## 2017-11-30 RX ADMIN — TRASTUZUMAB 518 MG: 150 INJECTION, POWDER, LYOPHILIZED, FOR SOLUTION INTRAVENOUS at 11:11

## 2017-11-30 RX ADMIN — DEXAMETHASONE SODIUM PHOSPHATE 10 MG: 4 INJECTION, SOLUTION INTRA-ARTICULAR; INTRALESIONAL; INTRAMUSCULAR; INTRAVENOUS; SOFT TISSUE at 09:11

## 2017-11-30 RX ADMIN — SODIUM CHLORIDE: 0.9 INJECTION, SOLUTION INTRAVENOUS at 09:11

## 2017-11-30 RX ADMIN — HEPARIN SODIUM (PORCINE) LOCK FLUSH IV SOLN 100 UNIT/ML 500 UNITS: 100 SOLUTION at 01:11

## 2017-11-30 RX ADMIN — PACLITAXEL 186 MG: 6 INJECTION, SOLUTION INTRAVENOUS at 10:11

## 2017-12-06 ENCOUNTER — LAB VISIT (OUTPATIENT)
Dept: LAB | Facility: HOSPITAL | Age: 72
End: 2017-12-06
Attending: INTERNAL MEDICINE
Payer: MEDICARE

## 2017-12-06 ENCOUNTER — OFFICE VISIT (OUTPATIENT)
Dept: HEMATOLOGY/ONCOLOGY | Facility: CLINIC | Age: 72
End: 2017-12-06
Payer: MEDICARE

## 2017-12-06 VITALS
HEART RATE: 76 BPM | HEIGHT: 61 IN | BODY MASS INDEX: 52.07 KG/M2 | DIASTOLIC BLOOD PRESSURE: 63 MMHG | OXYGEN SATURATION: 96 % | WEIGHT: 275.81 LBS | SYSTOLIC BLOOD PRESSURE: 108 MMHG

## 2017-12-06 DIAGNOSIS — Z17.0 CANCER OF LEFT BREAST, STAGE 1, ESTROGEN RECEPTOR POSITIVE: Primary | ICD-10-CM

## 2017-12-06 DIAGNOSIS — E87.6 HYPOKALEMIA: ICD-10-CM

## 2017-12-06 DIAGNOSIS — Z51.11 ENCOUNTER FOR ANTINEOPLASTIC CHEMOTHERAPY: ICD-10-CM

## 2017-12-06 DIAGNOSIS — Z17.0 CANCER OF LEFT BREAST, STAGE 1, ESTROGEN RECEPTOR POSITIVE: ICD-10-CM

## 2017-12-06 DIAGNOSIS — C50.912 CANCER OF LEFT BREAST, STAGE 1, ESTROGEN RECEPTOR POSITIVE: Primary | ICD-10-CM

## 2017-12-06 DIAGNOSIS — C50.912 CANCER OF LEFT BREAST, STAGE 1, ESTROGEN RECEPTOR POSITIVE: ICD-10-CM

## 2017-12-06 LAB
ALBUMIN SERPL BCP-MCNC: 3.5 G/DL
ALP SERPL-CCNC: 60 U/L
ALT SERPL W/O P-5'-P-CCNC: 57 U/L
ANION GAP SERPL CALC-SCNC: 13 MMOL/L
AST SERPL-CCNC: 28 U/L
BASOPHILS # BLD AUTO: 0.04 K/UL
BASOPHILS NFR BLD: 0.4 %
BILIRUB SERPL-MCNC: 0.5 MG/DL
BUN SERPL-MCNC: 11 MG/DL
CALCIUM SERPL-MCNC: 9.7 MG/DL
CHLORIDE SERPL-SCNC: 97 MMOL/L
CO2 SERPL-SCNC: 33 MMOL/L
CREAT SERPL-MCNC: 0.8 MG/DL
DIFFERENTIAL METHOD: ABNORMAL
EOSINOPHIL # BLD AUTO: 0.1 K/UL
EOSINOPHIL NFR BLD: 1.3 %
ERYTHROCYTE [DISTWIDTH] IN BLOOD BY AUTOMATED COUNT: 16.8 %
EST. GFR  (AFRICAN AMERICAN): >60 ML/MIN/1.73 M^2
EST. GFR  (NON AFRICAN AMERICAN): >60 ML/MIN/1.73 M^2
GLUCOSE SERPL-MCNC: 124 MG/DL
HCT VFR BLD AUTO: 38.2 %
HGB BLD-MCNC: 12.3 G/DL
LYMPHOCYTES # BLD AUTO: 5.3 K/UL
LYMPHOCYTES NFR BLD: 50.6 %
MCH RBC QN AUTO: 24.3 PG
MCHC RBC AUTO-ENTMCNC: 32.2 G/DL
MCV RBC AUTO: 75 FL
MONOCYTES # BLD AUTO: 0.4 K/UL
MONOCYTES NFR BLD: 3.5 %
NEUTROPHILS # BLD AUTO: 4.6 K/UL
NEUTROPHILS NFR BLD: 43.7 %
PLATELET # BLD AUTO: 335 K/UL
PMV BLD AUTO: 9.5 FL
POTASSIUM SERPL-SCNC: 3.3 MMOL/L
PROT SERPL-MCNC: 8 G/DL
RBC # BLD AUTO: 5.07 M/UL
SODIUM SERPL-SCNC: 143 MMOL/L
WBC # BLD AUTO: 10.4 K/UL

## 2017-12-06 PROCEDURE — 99999 PR PBB SHADOW E&M-EST. PATIENT-LVL II: CPT | Mod: PBBFAC,,, | Performed by: INTERNAL MEDICINE

## 2017-12-06 PROCEDURE — 80053 COMPREHEN METABOLIC PANEL: CPT

## 2017-12-06 PROCEDURE — 99214 OFFICE O/P EST MOD 30 MIN: CPT | Mod: S$GLB,,, | Performed by: INTERNAL MEDICINE

## 2017-12-06 PROCEDURE — 36415 COLL VENOUS BLD VENIPUNCTURE: CPT

## 2017-12-06 PROCEDURE — 85025 COMPLETE CBC W/AUTO DIFF WBC: CPT

## 2017-12-06 RX ORDER — EPINEPHRINE 0.3 MG/.3ML
0.3 INJECTION SUBCUTANEOUS ONCE AS NEEDED
Status: CANCELLED | OUTPATIENT
Start: 2017-12-07 | End: 2017-12-07

## 2017-12-06 RX ORDER — FAMOTIDINE 10 MG/ML
20 INJECTION INTRAVENOUS
Status: CANCELLED | OUTPATIENT
Start: 2017-12-07

## 2017-12-06 RX ORDER — DIPHENHYDRAMINE HYDROCHLORIDE 50 MG/ML
50 INJECTION INTRAMUSCULAR; INTRAVENOUS ONCE AS NEEDED
Status: CANCELLED | OUTPATIENT
Start: 2017-12-07 | End: 2017-12-07

## 2017-12-06 RX ORDER — TRIPROLIDINE/PSEUDOEPHEDRINE 2.5MG-60MG
TABLET ORAL EVERY 6 HOURS PRN
COMMUNITY

## 2017-12-06 RX ORDER — HEPARIN 100 UNIT/ML
500 SYRINGE INTRAVENOUS
Status: CANCELLED | OUTPATIENT
Start: 2017-12-07

## 2017-12-06 RX ORDER — SODIUM CHLORIDE 0.9 % (FLUSH) 0.9 %
10 SYRINGE (ML) INJECTION
Status: CANCELLED | OUTPATIENT
Start: 2017-12-07

## 2017-12-06 NOTE — PROGRESS NOTES
Subjective:       Patient ID: Karen Duke is a 72 y.o. female.    Chief Complaint: Breast Cancer    HPI PCP Dr. Sarina Macias.     She has newly diagnosed left breast cancer on routine screening mammogram done in 2017.      A 1.1 centimeter lobulated mass was noticed in the 8:00 position in the left breast.  Images were done at [facility].  Biopsy was done on 17.  It was high-grade invasive ductal carcinoma - ER +96%, [place] +94% and HER-2/bharti IHC 3+.  Ki-67 was 37%.     Menarche occurred at age 14.  Menopause at age 51.  She still has her uterus and ovaries in situ.  She is  with age of first pregnancy at 21.  No history of hormone replacement therapy.  She took oral contraceptive pills for 20-30 years.  No history of biopsy.       The patient has a history of sickle cell trait with no manifestation.  No history of transfusion.     Underwent left breast lumpectomy on 10/12/17.     Lumpectomy specimen showed 2 foci of high grade (grade 3) invasive carcinoma - measuring 10 millimeters and 13 millimeters. Margins negative.  1 sentinel lymph node removed and it was negative.    Started Taxol/Herceptin 17.    Review of Systems   Constitutional: Negative for fever and unexpected weight change.   Respiratory: Negative for wheezing and stridor.    Gastrointestinal: Negative for abdominal pain and blood in stool.   Hematological: Negative for adenopathy. Does not bruise/bleed easily.         Objective:      Physical Exam   Constitutional: She is oriented to person, place, and time. No distress.   HENT:   Mouth/Throat: No oropharyngeal exudate.   Eyes: No scleral icterus.   Neck: Neck supple.   Cardiovascular: Normal rate.  Exam reveals no gallop.    Pulmonary/Chest: Effort normal. She has no wheezes. She has no rales.   Abdominal: Soft. There is no tenderness.   Musculoskeletal: She exhibits no edema.   Lymphadenopathy:     She has no cervical adenopathy.   Neurological: She is alert and oriented to  person, place, and time.   Skin: She is not diaphoretic.         Assessment:       1. Cancer of left breast, stage 1, estrogen receptor positive    2. Encounter for antineoplastic chemotherapy    3. Hypokalemia        Plan:   In summary this is a 72-year-old female with Stage I ER/[place]/HER-2/bharti positive high-grade invasive ductal carcinoma of the left breast with Ki-67 at 37%.     The plan is to do 3 months of Taxol/Herceptin therapy followed by adjuvant radiation therapy followed by 9 months of single agent Herceptin therapy - so asked to complete a total of 1 year of Herceptin.    Started Taxol/Herceptin 11/30/17.    Proceed with second dose scheduled for tomorrow.    Hypokalemia persistent.  Increase potassium chloride to 25 mEq twice a day.

## 2017-12-07 ENCOUNTER — INFUSION (OUTPATIENT)
Dept: INFUSION THERAPY | Facility: HOSPITAL | Age: 72
End: 2017-12-07
Attending: INTERNAL MEDICINE
Payer: MEDICARE

## 2017-12-07 VITALS
RESPIRATION RATE: 18 BRPM | WEIGHT: 275.81 LBS | TEMPERATURE: 98 F | DIASTOLIC BLOOD PRESSURE: 70 MMHG | HEIGHT: 61 IN | HEART RATE: 117 BPM | BODY MASS INDEX: 52.07 KG/M2 | SYSTOLIC BLOOD PRESSURE: 121 MMHG

## 2017-12-07 DIAGNOSIS — Z17.0 CANCER OF LEFT BREAST, STAGE 1, ESTROGEN RECEPTOR POSITIVE: ICD-10-CM

## 2017-12-07 DIAGNOSIS — C50.912 CANCER OF LEFT BREAST, STAGE 1, ESTROGEN RECEPTOR POSITIVE: ICD-10-CM

## 2017-12-07 DIAGNOSIS — Z51.11 ENCOUNTER FOR ANTINEOPLASTIC CHEMOTHERAPY: Primary | ICD-10-CM

## 2017-12-07 PROCEDURE — 63600175 PHARM REV CODE 636 W HCPCS: Performed by: INTERNAL MEDICINE

## 2017-12-07 PROCEDURE — 96417 CHEMO IV INFUS EACH ADDL SEQ: CPT

## 2017-12-07 PROCEDURE — 25000003 PHARM REV CODE 250: Performed by: INTERNAL MEDICINE

## 2017-12-07 PROCEDURE — 96413 CHEMO IV INFUSION 1 HR: CPT

## 2017-12-07 PROCEDURE — S0028 INJECTION, FAMOTIDINE, 20 MG: HCPCS | Performed by: INTERNAL MEDICINE

## 2017-12-07 PROCEDURE — 96375 TX/PRO/DX INJ NEW DRUG ADDON: CPT

## 2017-12-07 RX ORDER — DIPHENHYDRAMINE HYDROCHLORIDE 50 MG/ML
25 INJECTION INTRAMUSCULAR; INTRAVENOUS
Status: COMPLETED | OUTPATIENT
Start: 2017-12-07 | End: 2017-12-07

## 2017-12-07 RX ORDER — DEXAMETHASONE SODIUM PHOSPHATE 100 MG/10ML
10 INJECTION INTRAMUSCULAR; INTRAVENOUS
Status: COMPLETED | OUTPATIENT
Start: 2017-12-07 | End: 2017-12-07

## 2017-12-07 RX ORDER — HEPARIN 100 UNIT/ML
500 SYRINGE INTRAVENOUS
Status: DISCONTINUED | OUTPATIENT
Start: 2017-12-07 | End: 2017-12-07 | Stop reason: HOSPADM

## 2017-12-07 RX ORDER — POTASSIUM CHLORIDE 20 MEQ/1
20 TABLET, EXTENDED RELEASE ORAL 2 TIMES DAILY
Qty: 60 TABLET | Refills: 1 | Status: SHIPPED | OUTPATIENT
Start: 2017-12-07 | End: 2018-01-10 | Stop reason: SDUPTHER

## 2017-12-07 RX ORDER — FAMOTIDINE 10 MG/ML
20 INJECTION INTRAVENOUS
Status: COMPLETED | OUTPATIENT
Start: 2017-12-07 | End: 2017-12-07

## 2017-12-07 RX ORDER — SODIUM CHLORIDE 0.9 % (FLUSH) 0.9 %
10 SYRINGE (ML) INJECTION
Status: DISCONTINUED | OUTPATIENT
Start: 2017-12-07 | End: 2017-12-07 | Stop reason: HOSPADM

## 2017-12-07 RX ORDER — DIPHENHYDRAMINE HYDROCHLORIDE 50 MG/ML
50 INJECTION INTRAMUSCULAR; INTRAVENOUS ONCE AS NEEDED
Status: DISCONTINUED | OUTPATIENT
Start: 2017-12-07 | End: 2017-12-07 | Stop reason: HOSPADM

## 2017-12-07 RX ORDER — EPINEPHRINE 0.3 MG/.3ML
0.3 INJECTION SUBCUTANEOUS ONCE AS NEEDED
Status: DISCONTINUED | OUTPATIENT
Start: 2017-12-07 | End: 2017-12-07 | Stop reason: HOSPADM

## 2017-12-07 RX ADMIN — FAMOTIDINE 20 MG: 10 INJECTION INTRAVENOUS at 09:12

## 2017-12-07 RX ADMIN — DIPHENHYDRAMINE HYDROCHLORIDE 25 MG: 50 INJECTION, SOLUTION INTRAMUSCULAR; INTRAVENOUS at 09:12

## 2017-12-07 RX ADMIN — HEPARIN SODIUM (PORCINE) LOCK FLUSH IV SOLN 100 UNIT/ML 500 UNITS: 100 SOLUTION at 11:12

## 2017-12-07 RX ADMIN — TRASTUZUMAB 250 MG: 150 INJECTION, POWDER, LYOPHILIZED, FOR SOLUTION INTRAVENOUS at 11:12

## 2017-12-07 RX ADMIN — SODIUM CHLORIDE: 0.9 INJECTION, SOLUTION INTRAVENOUS at 09:12

## 2017-12-07 RX ADMIN — DEXAMETHASONE SODIUM PHOSPHATE 10 MG: 10 INJECTION, SOLUTION INTRAMUSCULAR; INTRAVENOUS at 09:12

## 2017-12-07 RX ADMIN — PACLITAXEL 186 MG: 6 INJECTION, SOLUTION INTRAVENOUS at 10:12

## 2017-12-07 NOTE — NURSING
Pt tolerated Taxol/Herceptin chemo well. No adverse reaction noted. Pt education reinforced on chemo regimen, side effects, what to expect, and when to call _[facility]_. Pt verbalized understanding. I reviewed pt calendar w/ pt and understanding verbalized. PAC deaccessed and flushed w/ NS and heparin per protocol.

## 2017-12-13 ENCOUNTER — OFFICE VISIT (OUTPATIENT)
Dept: HEMATOLOGY/ONCOLOGY | Facility: CLINIC | Age: 72
End: 2017-12-13
Payer: MEDICARE

## 2017-12-13 ENCOUNTER — LAB VISIT (OUTPATIENT)
Dept: LAB | Facility: HOSPITAL | Age: 72
End: 2017-12-13
Attending: INTERNAL MEDICINE
Payer: MEDICARE

## 2017-12-13 VITALS
HEART RATE: 105 BPM | SYSTOLIC BLOOD PRESSURE: 93 MMHG | OXYGEN SATURATION: 96 % | WEIGHT: 282.88 LBS | BODY MASS INDEX: 53.41 KG/M2 | TEMPERATURE: 98 F | DIASTOLIC BLOOD PRESSURE: 67 MMHG | HEIGHT: 61 IN

## 2017-12-13 DIAGNOSIS — C50.912 CANCER OF LEFT BREAST, STAGE 1, ESTROGEN RECEPTOR POSITIVE: ICD-10-CM

## 2017-12-13 DIAGNOSIS — Z51.11 ENCOUNTER FOR ANTINEOPLASTIC CHEMOTHERAPY: ICD-10-CM

## 2017-12-13 DIAGNOSIS — E87.6 HYPOKALEMIA: ICD-10-CM

## 2017-12-13 DIAGNOSIS — C50.912 CANCER OF LEFT BREAST, STAGE 1, ESTROGEN RECEPTOR POSITIVE: Primary | ICD-10-CM

## 2017-12-13 DIAGNOSIS — Z17.0 CANCER OF LEFT BREAST, STAGE 1, ESTROGEN RECEPTOR POSITIVE: ICD-10-CM

## 2017-12-13 DIAGNOSIS — Z17.0 CANCER OF LEFT BREAST, STAGE 1, ESTROGEN RECEPTOR POSITIVE: Primary | ICD-10-CM

## 2017-12-13 LAB
ALBUMIN SERPL BCP-MCNC: 3.1 G/DL
ALP SERPL-CCNC: 56 U/L
ALT SERPL W/O P-5'-P-CCNC: 36 U/L
ANION GAP SERPL CALC-SCNC: 10 MMOL/L
AST SERPL-CCNC: 20 U/L
BASOPHILS # BLD AUTO: 0.03 K/UL
BASOPHILS NFR BLD: 0.4 %
BILIRUB SERPL-MCNC: 0.3 MG/DL
BUN SERPL-MCNC: 11 MG/DL
CALCIUM SERPL-MCNC: 8.9 MG/DL
CHLORIDE SERPL-SCNC: 96 MMOL/L
CO2 SERPL-SCNC: 35 MMOL/L
CREAT SERPL-MCNC: 0.8 MG/DL
DIFFERENTIAL METHOD: ABNORMAL
EOSINOPHIL # BLD AUTO: 0.1 K/UL
EOSINOPHIL NFR BLD: 0.8 %
ERYTHROCYTE [DISTWIDTH] IN BLOOD BY AUTOMATED COUNT: 16.9 %
EST. GFR  (AFRICAN AMERICAN): >60 ML/MIN/1.73 M^2
EST. GFR  (NON AFRICAN AMERICAN): >60 ML/MIN/1.73 M^2
GLUCOSE SERPL-MCNC: 143 MG/DL
HCT VFR BLD AUTO: 35 %
HGB BLD-MCNC: 11.2 G/DL
LYMPHOCYTES # BLD AUTO: 3.4 K/UL
LYMPHOCYTES NFR BLD: 47.5 %
MCH RBC QN AUTO: 24 PG
MCHC RBC AUTO-ENTMCNC: 32 G/DL
MCV RBC AUTO: 75 FL
MONOCYTES # BLD AUTO: 0.3 K/UL
MONOCYTES NFR BLD: 4.7 %
NEUTROPHILS # BLD AUTO: 3.3 K/UL
NEUTROPHILS NFR BLD: 46.2 %
PLATELET # BLD AUTO: 353 K/UL
PMV BLD AUTO: 9.6 FL
POTASSIUM SERPL-SCNC: 3.3 MMOL/L
PROT SERPL-MCNC: 7.3 G/DL
RBC # BLD AUTO: 4.66 M/UL
SODIUM SERPL-SCNC: 141 MMOL/L
WBC # BLD AUTO: 7.09 K/UL

## 2017-12-13 PROCEDURE — 36415 COLL VENOUS BLD VENIPUNCTURE: CPT

## 2017-12-13 PROCEDURE — 99999 PR PBB SHADOW E&M-EST. PATIENT-LVL III: CPT | Mod: PBBFAC,,, | Performed by: INTERNAL MEDICINE

## 2017-12-13 PROCEDURE — 99214 OFFICE O/P EST MOD 30 MIN: CPT | Mod: S$GLB,,, | Performed by: INTERNAL MEDICINE

## 2017-12-13 PROCEDURE — 85025 COMPLETE CBC W/AUTO DIFF WBC: CPT

## 2017-12-13 PROCEDURE — 80053 COMPREHEN METABOLIC PANEL: CPT

## 2017-12-13 RX ORDER — SODIUM CHLORIDE 0.9 % (FLUSH) 0.9 %
10 SYRINGE (ML) INJECTION
Status: CANCELLED | OUTPATIENT
Start: 2017-12-14

## 2017-12-13 RX ORDER — FAMOTIDINE 10 MG/ML
20 INJECTION INTRAVENOUS
Status: CANCELLED | OUTPATIENT
Start: 2017-12-14

## 2017-12-13 RX ORDER — HEPARIN 100 UNIT/ML
500 SYRINGE INTRAVENOUS
Status: CANCELLED | OUTPATIENT
Start: 2017-12-14

## 2017-12-13 RX ORDER — DIPHENHYDRAMINE HYDROCHLORIDE 50 MG/ML
50 INJECTION INTRAMUSCULAR; INTRAVENOUS ONCE AS NEEDED
Status: CANCELLED | OUTPATIENT
Start: 2017-12-14 | End: 2017-12-14

## 2017-12-13 RX ORDER — EPINEPHRINE 0.3 MG/.3ML
0.3 INJECTION SUBCUTANEOUS ONCE AS NEEDED
Status: CANCELLED | OUTPATIENT
Start: 2017-12-14 | End: 2017-12-14

## 2017-12-13 NOTE — PROGRESS NOTES
Subjective:       Patient ID: Karen Duke is a 72 y.o. female.    Chief Complaint: No chief complaint on file.    HPI PCP Dr. Sarina Macias.     She has newly diagnosed left breast cancer on routine screening mammogram done in 2017.      A 1.1 centimeter lobulated mass was noticed in the 8:00 position in the left breast.  Images were done at [facility].  Biopsy was done on 17.  It was high-grade invasive ductal carcinoma - ER +96%, MT +94% and HER-2/bharti IHC 3+.  Ki-67 was 37%.     Menarche occurred at age 14.  Menopause at age 51.  She still has her uterus and ovaries in situ.  She is  with age of first pregnancy at 21.  No history of hormone replacement therapy.  She took oral contraceptive pills for 20-30 years.  No history of biopsy.       The patient has a history of sickle cell trait with no manifestation.  No history of transfusion.     Underwent left breast lumpectomy on 10/12/17.     Lumpectomy specimen showed 2 foci of high grade (grade 3) invasive carcinoma - measuring 10 millimeters and 13 millimeters. Margins negative.  1 sentinel lymph node removed and it was negative.    Started Taxol/Herceptin 17.    Review of Systems   Constitutional: Negative for fever and unexpected weight change.   Respiratory: Negative for wheezing and stridor.    Gastrointestinal: Negative for abdominal pain and blood in stool.   Hematological: Negative for adenopathy. Does not bruise/bleed easily.         Objective:      Physical Exam   Constitutional: She is oriented to person, place, and time. No distress.   HENT:   Mouth/Throat: No oropharyngeal exudate.   Eyes: No scleral icterus.   Neck: Neck supple.   Cardiovascular: Normal rate.  Exam reveals no gallop.    Pulmonary/Chest: Effort normal. She has no wheezes. She has no rales.   Abdominal: Soft. There is no tenderness.   Musculoskeletal: She exhibits no edema.   Lymphadenopathy:     She has no cervical adenopathy.   Neurological: She is alert and  oriented to person, place, and time.   Skin: She is not diaphoretic.         Assessment:       1. Cancer of left breast, stage 1, estrogen receptor positive    2. Encounter for antineoplastic chemotherapy    3. Hypokalemia        Plan:   In summary this is a 72-year-old female with Stage I ER/ID/HER-2/bharti positive high-grade invasive ductal carcinoma of the left breast with Ki-67 at 37%.     The plan is to do 3 months of Taxol/Herceptin therapy followed by adjuvant radiation therapy followed by 9 months of single agent Herceptin therapy - so asked to complete a total of 1 year of Herceptin.    Started Taxol/Herceptin 11/30/17.    Proceed with 3rd dose scheduled for tomorrow.    Hypokalemia persistent. Continue potassium chloride 50 mEq QD

## 2017-12-14 ENCOUNTER — INFUSION (OUTPATIENT)
Dept: INFUSION THERAPY | Facility: HOSPITAL | Age: 72
End: 2017-12-14
Attending: INTERNAL MEDICINE
Payer: MEDICARE

## 2017-12-14 VITALS
DIASTOLIC BLOOD PRESSURE: 77 MMHG | HEART RATE: 105 BPM | RESPIRATION RATE: 18 BRPM | TEMPERATURE: 98 F | SYSTOLIC BLOOD PRESSURE: 133 MMHG

## 2017-12-14 DIAGNOSIS — C50.912 CANCER OF LEFT BREAST, STAGE 1, ESTROGEN RECEPTOR POSITIVE: ICD-10-CM

## 2017-12-14 DIAGNOSIS — Z17.0 CANCER OF LEFT BREAST, STAGE 1, ESTROGEN RECEPTOR POSITIVE: ICD-10-CM

## 2017-12-14 DIAGNOSIS — Z51.11 ENCOUNTER FOR ANTINEOPLASTIC CHEMOTHERAPY: Primary | ICD-10-CM

## 2017-12-14 PROCEDURE — 96413 CHEMO IV INFUSION 1 HR: CPT

## 2017-12-14 PROCEDURE — S0028 INJECTION, FAMOTIDINE, 20 MG: HCPCS | Performed by: INTERNAL MEDICINE

## 2017-12-14 PROCEDURE — 96375 TX/PRO/DX INJ NEW DRUG ADDON: CPT

## 2017-12-14 PROCEDURE — 25000003 PHARM REV CODE 250: Performed by: INTERNAL MEDICINE

## 2017-12-14 PROCEDURE — 63600175 PHARM REV CODE 636 W HCPCS: Performed by: INTERNAL MEDICINE

## 2017-12-14 PROCEDURE — 96417 CHEMO IV INFUS EACH ADDL SEQ: CPT

## 2017-12-14 RX ORDER — DIPHENHYDRAMINE HYDROCHLORIDE 50 MG/ML
50 INJECTION INTRAMUSCULAR; INTRAVENOUS ONCE AS NEEDED
Status: DISCONTINUED | OUTPATIENT
Start: 2017-12-14 | End: 2017-12-14 | Stop reason: HOSPADM

## 2017-12-14 RX ORDER — HEPARIN 100 UNIT/ML
500 SYRINGE INTRAVENOUS
Status: DISCONTINUED | OUTPATIENT
Start: 2017-12-14 | End: 2017-12-14 | Stop reason: HOSPADM

## 2017-12-14 RX ORDER — SODIUM CHLORIDE 0.9 % (FLUSH) 0.9 %
10 SYRINGE (ML) INJECTION
Status: DISCONTINUED | OUTPATIENT
Start: 2017-12-14 | End: 2017-12-14 | Stop reason: HOSPADM

## 2017-12-14 RX ORDER — FAMOTIDINE 10 MG/ML
20 INJECTION INTRAVENOUS
Status: COMPLETED | OUTPATIENT
Start: 2017-12-14 | End: 2017-12-14

## 2017-12-14 RX ORDER — DIPHENHYDRAMINE HYDROCHLORIDE 50 MG/ML
25 INJECTION INTRAMUSCULAR; INTRAVENOUS ONCE
Status: COMPLETED | OUTPATIENT
Start: 2017-12-14 | End: 2017-12-14

## 2017-12-14 RX ORDER — DIPHENHYDRAMINE HYDROCHLORIDE 50 MG/ML
25 INJECTION INTRAMUSCULAR; INTRAVENOUS ONCE
Status: DISCONTINUED | OUTPATIENT
Start: 2017-12-14 | End: 2017-12-14 | Stop reason: HOSPADM

## 2017-12-14 RX ORDER — EPINEPHRINE 0.3 MG/.3ML
0.3 INJECTION SUBCUTANEOUS ONCE AS NEEDED
Status: DISCONTINUED | OUTPATIENT
Start: 2017-12-14 | End: 2017-12-14 | Stop reason: HOSPADM

## 2017-12-14 RX ORDER — DEXAMETHASONE SODIUM PHOSPHATE 100 MG/10ML
10 INJECTION INTRAMUSCULAR; INTRAVENOUS ONCE
Status: COMPLETED | OUTPATIENT
Start: 2017-12-14 | End: 2017-12-14

## 2017-12-14 RX ADMIN — FAMOTIDINE 20 MG: 10 INJECTION INTRAVENOUS at 09:12

## 2017-12-14 RX ADMIN — SODIUM CHLORIDE: 900 INJECTION, SOLUTION INTRAVENOUS at 09:12

## 2017-12-14 RX ADMIN — TRASTUZUMAB 257 MG: 150 INJECTION, POWDER, LYOPHILIZED, FOR SOLUTION INTRAVENOUS at 11:12

## 2017-12-14 RX ADMIN — HEPARIN SODIUM (PORCINE) LOCK FLUSH IV SOLN 100 UNIT/ML 500 UNITS: 100 SOLUTION at 12:12

## 2017-12-14 RX ADMIN — PACLITAXEL 186 MG: 6 INJECTION, SOLUTION INTRAVENOUS at 10:12

## 2017-12-14 RX ADMIN — DEXAMETHASONE SODIUM PHOSPHATE 10 MG: 10 INJECTION, SOLUTION INTRAMUSCULAR; INTRAVENOUS at 09:12

## 2017-12-14 RX ADMIN — DIPHENHYDRAMINE HYDROCHLORIDE 25 MG: 50 INJECTION, SOLUTION INTRAMUSCULAR; INTRAVENOUS at 09:12

## 2017-12-14 NOTE — NURSING
Pt tolerated Taxol/Herceptin chemo well. No adverse reaction noted. Pt education reinforced on chemo regimen, side effects, what to expect, and when to call __. Pt verbalized understanding. I reviewed pt calendar w/ pt and understanding verbalized. PAC deaccessed and flushed w/ NS and heparin per protocol.

## 2017-12-20 ENCOUNTER — LAB VISIT (OUTPATIENT)
Dept: LAB | Facility: HOSPITAL | Age: 72
End: 2017-12-20
Attending: FAMILY MEDICINE
Payer: MEDICARE

## 2017-12-20 DIAGNOSIS — Z17.0 CANCER OF LEFT BREAST, STAGE 1, ESTROGEN RECEPTOR POSITIVE: ICD-10-CM

## 2017-12-20 DIAGNOSIS — C50.912 CANCER OF LEFT BREAST, STAGE 1, ESTROGEN RECEPTOR POSITIVE: ICD-10-CM

## 2017-12-20 LAB
ALBUMIN SERPL BCP-MCNC: 3.2 G/DL
ALP SERPL-CCNC: 54 U/L
ALT SERPL W/O P-5'-P-CCNC: 27 U/L
ANION GAP SERPL CALC-SCNC: 6 MMOL/L
AST SERPL-CCNC: 19 U/L
BILIRUB SERPL-MCNC: 0.2 MG/DL
BUN SERPL-MCNC: 7 MG/DL
CALCIUM SERPL-MCNC: 9.1 MG/DL
CHLORIDE SERPL-SCNC: 99 MMOL/L
CO2 SERPL-SCNC: 35 MMOL/L
CREAT SERPL-MCNC: 0.8 MG/DL
ERYTHROCYTE [DISTWIDTH] IN BLOOD BY AUTOMATED COUNT: 17.1 %
EST. GFR  (AFRICAN AMERICAN): >60 ML/MIN/1.73 M^2
EST. GFR  (NON AFRICAN AMERICAN): >60 ML/MIN/1.73 M^2
GLUCOSE SERPL-MCNC: 112 MG/DL
HCT VFR BLD AUTO: 34.6 %
HGB BLD-MCNC: 11.1 G/DL
MCH RBC QN AUTO: 24.1 PG
MCHC RBC AUTO-ENTMCNC: 32.1 G/DL
MCV RBC AUTO: 75 FL
NEUTROPHILS # BLD AUTO: 2.4 K/UL
PLATELET # BLD AUTO: 369 K/UL
PMV BLD AUTO: 9.1 FL
POTASSIUM SERPL-SCNC: 3.3 MMOL/L
PROT SERPL-MCNC: 7.4 G/DL
RBC # BLD AUTO: 4.61 M/UL
SODIUM SERPL-SCNC: 140 MMOL/L
WBC # BLD AUTO: 6.73 K/UL

## 2017-12-20 PROCEDURE — 80053 COMPREHEN METABOLIC PANEL: CPT

## 2017-12-20 PROCEDURE — 85027 COMPLETE CBC AUTOMATED: CPT

## 2017-12-20 PROCEDURE — 36415 COLL VENOUS BLD VENIPUNCTURE: CPT

## 2017-12-21 ENCOUNTER — INFUSION (OUTPATIENT)
Dept: INFUSION THERAPY | Facility: HOSPITAL | Age: 72
End: 2017-12-21
Attending: INTERNAL MEDICINE
Payer: MEDICARE

## 2017-12-21 VITALS
TEMPERATURE: 98 F | WEIGHT: 282.88 LBS | HEIGHT: 61 IN | RESPIRATION RATE: 18 BRPM | BODY MASS INDEX: 53.41 KG/M2 | SYSTOLIC BLOOD PRESSURE: 144 MMHG | DIASTOLIC BLOOD PRESSURE: 68 MMHG | HEART RATE: 101 BPM

## 2017-12-21 DIAGNOSIS — Z51.11 ENCOUNTER FOR ANTINEOPLASTIC CHEMOTHERAPY: Primary | ICD-10-CM

## 2017-12-21 DIAGNOSIS — C50.912 CANCER OF LEFT BREAST, STAGE 1, ESTROGEN RECEPTOR POSITIVE: ICD-10-CM

## 2017-12-21 DIAGNOSIS — Z17.0 CANCER OF LEFT BREAST, STAGE 1, ESTROGEN RECEPTOR POSITIVE: ICD-10-CM

## 2017-12-21 PROCEDURE — 25000003 PHARM REV CODE 250: Performed by: INTERNAL MEDICINE

## 2017-12-21 PROCEDURE — S0028 INJECTION, FAMOTIDINE, 20 MG: HCPCS | Performed by: INTERNAL MEDICINE

## 2017-12-21 PROCEDURE — 96417 CHEMO IV INFUS EACH ADDL SEQ: CPT

## 2017-12-21 PROCEDURE — 96413 CHEMO IV INFUSION 1 HR: CPT

## 2017-12-21 PROCEDURE — 63600175 PHARM REV CODE 636 W HCPCS: Performed by: INTERNAL MEDICINE

## 2017-12-21 PROCEDURE — 96375 TX/PRO/DX INJ NEW DRUG ADDON: CPT

## 2017-12-21 RX ORDER — DIPHENHYDRAMINE HYDROCHLORIDE 50 MG/ML
25 INJECTION INTRAMUSCULAR; INTRAVENOUS
Status: COMPLETED | OUTPATIENT
Start: 2017-12-21 | End: 2017-12-21

## 2017-12-21 RX ORDER — DIPHENHYDRAMINE HYDROCHLORIDE 50 MG/ML
50 INJECTION INTRAMUSCULAR; INTRAVENOUS ONCE AS NEEDED
Status: COMPLETED | OUTPATIENT
Start: 2017-12-21 | End: 2017-12-21

## 2017-12-21 RX ORDER — DEXAMETHASONE SODIUM PHOSPHATE 100 MG/10ML
10 INJECTION INTRAMUSCULAR; INTRAVENOUS
Status: DISCONTINUED | OUTPATIENT
Start: 2017-12-21 | End: 2017-12-21 | Stop reason: HOSPADM

## 2017-12-21 RX ORDER — DIPHENHYDRAMINE HYDROCHLORIDE 50 MG/ML
50 INJECTION INTRAMUSCULAR; INTRAVENOUS ONCE AS NEEDED
Status: CANCELLED | OUTPATIENT
Start: 2017-12-28 | End: 2017-12-28

## 2017-12-21 RX ORDER — HEPARIN 100 UNIT/ML
500 SYRINGE INTRAVENOUS
Status: DISCONTINUED | OUTPATIENT
Start: 2017-12-21 | End: 2017-12-21 | Stop reason: HOSPADM

## 2017-12-21 RX ORDER — HEPARIN 100 UNIT/ML
500 SYRINGE INTRAVENOUS
Status: CANCELLED | OUTPATIENT
Start: 2017-12-28

## 2017-12-21 RX ORDER — EPINEPHRINE 0.3 MG/.3ML
0.3 INJECTION SUBCUTANEOUS ONCE AS NEEDED
Status: DISCONTINUED | OUTPATIENT
Start: 2017-12-21 | End: 2017-12-21 | Stop reason: HOSPADM

## 2017-12-21 RX ORDER — SODIUM CHLORIDE 0.9 % (FLUSH) 0.9 %
10 SYRINGE (ML) INJECTION
Status: CANCELLED | OUTPATIENT
Start: 2017-12-28

## 2017-12-21 RX ORDER — SODIUM CHLORIDE 0.9 % (FLUSH) 0.9 %
10 SYRINGE (ML) INJECTION
Status: DISCONTINUED | OUTPATIENT
Start: 2017-12-21 | End: 2017-12-21 | Stop reason: HOSPADM

## 2017-12-21 RX ORDER — FAMOTIDINE 10 MG/ML
20 INJECTION INTRAVENOUS
Status: COMPLETED | OUTPATIENT
Start: 2017-12-21 | End: 2017-12-21

## 2017-12-21 RX ORDER — FAMOTIDINE 10 MG/ML
20 INJECTION INTRAVENOUS
Status: CANCELLED | OUTPATIENT
Start: 2017-12-28

## 2017-12-21 RX ORDER — EPINEPHRINE 0.3 MG/.3ML
0.3 INJECTION SUBCUTANEOUS ONCE AS NEEDED
Status: CANCELLED | OUTPATIENT
Start: 2017-12-28 | End: 2017-12-28

## 2017-12-21 RX ADMIN — DIPHENHYDRAMINE HYDROCHLORIDE 25 MG: 50 INJECTION, SOLUTION INTRAMUSCULAR; INTRAVENOUS at 12:12

## 2017-12-21 RX ADMIN — PACLITAXEL 186 MG: 6 INJECTION, SOLUTION INTRAVENOUS at 01:12

## 2017-12-21 RX ADMIN — SODIUM CHLORIDE: 900 INJECTION, SOLUTION INTRAVENOUS at 12:12

## 2017-12-21 RX ADMIN — FAMOTIDINE 20 MG: 10 INJECTION, SOLUTION INTRAVENOUS at 12:12

## 2017-12-21 RX ADMIN — TRASTUZUMAB 257 MG: 150 INJECTION, POWDER, LYOPHILIZED, FOR SOLUTION INTRAVENOUS at 02:12

## 2017-12-21 RX ADMIN — DIPHENHYDRAMINE HYDROCHLORIDE 50 MG: 50 INJECTION, SOLUTION INTRAMUSCULAR; INTRAVENOUS at 12:12

## 2017-12-21 RX ADMIN — HEPARIN 500 UNITS: 100 SYRINGE at 02:12

## 2017-12-27 ENCOUNTER — OFFICE VISIT (OUTPATIENT)
Dept: HEMATOLOGY/ONCOLOGY | Facility: CLINIC | Age: 72
End: 2017-12-27
Payer: MEDICARE

## 2017-12-27 ENCOUNTER — LAB VISIT (OUTPATIENT)
Dept: LAB | Facility: HOSPITAL | Age: 72
End: 2017-12-27
Attending: INTERNAL MEDICINE
Payer: MEDICARE

## 2017-12-27 VITALS
TEMPERATURE: 99 F | DIASTOLIC BLOOD PRESSURE: 68 MMHG | HEART RATE: 114 BPM | HEIGHT: 61 IN | BODY MASS INDEX: 53.66 KG/M2 | SYSTOLIC BLOOD PRESSURE: 122 MMHG | WEIGHT: 284.19 LBS | OXYGEN SATURATION: 92 %

## 2017-12-27 DIAGNOSIS — Z51.11 ENCOUNTER FOR ANTINEOPLASTIC CHEMOTHERAPY: ICD-10-CM

## 2017-12-27 DIAGNOSIS — C50.912 CANCER OF LEFT BREAST, STAGE 1, ESTROGEN RECEPTOR POSITIVE: ICD-10-CM

## 2017-12-27 DIAGNOSIS — E87.6 HYPOKALEMIA: ICD-10-CM

## 2017-12-27 DIAGNOSIS — Z17.0 CANCER OF LEFT BREAST, STAGE 1, ESTROGEN RECEPTOR POSITIVE: ICD-10-CM

## 2017-12-27 DIAGNOSIS — Z17.0 CANCER OF LEFT BREAST, STAGE 1, ESTROGEN RECEPTOR POSITIVE: Primary | ICD-10-CM

## 2017-12-27 DIAGNOSIS — C50.912 CANCER OF LEFT BREAST, STAGE 1, ESTROGEN RECEPTOR POSITIVE: Primary | ICD-10-CM

## 2017-12-27 LAB
ALBUMIN SERPL BCP-MCNC: 3.2 G/DL
ALP SERPL-CCNC: 56 U/L
ALT SERPL W/O P-5'-P-CCNC: 33 U/L
ANION GAP SERPL CALC-SCNC: 10 MMOL/L
AST SERPL-CCNC: 21 U/L
BILIRUB SERPL-MCNC: 0.2 MG/DL
BUN SERPL-MCNC: 10 MG/DL
CALCIUM SERPL-MCNC: 9 MG/DL
CHLORIDE SERPL-SCNC: 97 MMOL/L
CO2 SERPL-SCNC: 33 MMOL/L
CREAT SERPL-MCNC: 0.8 MG/DL
ERYTHROCYTE [DISTWIDTH] IN BLOOD BY AUTOMATED COUNT: 17.4 %
EST. GFR  (AFRICAN AMERICAN): >60 ML/MIN/1.73 M^2
EST. GFR  (NON AFRICAN AMERICAN): >60 ML/MIN/1.73 M^2
GLUCOSE SERPL-MCNC: 122 MG/DL
HCT VFR BLD AUTO: 34.8 %
HGB BLD-MCNC: 11.2 G/DL
MCH RBC QN AUTO: 24.1 PG
MCHC RBC AUTO-ENTMCNC: 32.2 G/DL
MCV RBC AUTO: 75 FL
NEUTROPHILS # BLD AUTO: 3 K/UL
PLATELET # BLD AUTO: 366 K/UL
PMV BLD AUTO: 9.1 FL
POTASSIUM SERPL-SCNC: 3.1 MMOL/L
PROT SERPL-MCNC: 7.2 G/DL
RBC # BLD AUTO: 4.65 M/UL
SODIUM SERPL-SCNC: 140 MMOL/L
WBC # BLD AUTO: 6.93 K/UL

## 2017-12-27 PROCEDURE — 85027 COMPLETE CBC AUTOMATED: CPT

## 2017-12-27 PROCEDURE — 99214 OFFICE O/P EST MOD 30 MIN: CPT | Mod: S$GLB,,, | Performed by: INTERNAL MEDICINE

## 2017-12-27 PROCEDURE — 99999 PR PBB SHADOW E&M-EST. PATIENT-LVL III: CPT | Mod: PBBFAC,,, | Performed by: INTERNAL MEDICINE

## 2017-12-27 PROCEDURE — 36415 COLL VENOUS BLD VENIPUNCTURE: CPT

## 2017-12-27 PROCEDURE — 80053 COMPREHEN METABOLIC PANEL: CPT

## 2017-12-27 RX ORDER — EPINEPHRINE 0.3 MG/.3ML
0.3 INJECTION SUBCUTANEOUS ONCE AS NEEDED
Status: CANCELLED | OUTPATIENT
Start: 2018-01-11 | End: 2018-01-11

## 2017-12-27 RX ORDER — HEPARIN 100 UNIT/ML
500 SYRINGE INTRAVENOUS
Status: CANCELLED | OUTPATIENT
Start: 2018-01-04

## 2017-12-27 RX ORDER — SODIUM CHLORIDE 0.9 % (FLUSH) 0.9 %
10 SYRINGE (ML) INJECTION
Status: CANCELLED | OUTPATIENT
Start: 2018-01-11

## 2017-12-27 RX ORDER — FAMOTIDINE 10 MG/ML
20 INJECTION INTRAVENOUS
Status: CANCELLED | OUTPATIENT
Start: 2018-01-11

## 2017-12-27 RX ORDER — EPINEPHRINE 0.3 MG/.3ML
0.3 INJECTION SUBCUTANEOUS ONCE AS NEEDED
Status: CANCELLED | OUTPATIENT
Start: 2018-01-04 | End: 2018-01-04

## 2017-12-27 RX ORDER — DIPHENHYDRAMINE HYDROCHLORIDE 50 MG/ML
50 INJECTION INTRAMUSCULAR; INTRAVENOUS ONCE AS NEEDED
Status: CANCELLED | OUTPATIENT
Start: 2018-01-04 | End: 2018-01-04

## 2017-12-27 RX ORDER — SODIUM CHLORIDE 0.9 % (FLUSH) 0.9 %
10 SYRINGE (ML) INJECTION
Status: CANCELLED | OUTPATIENT
Start: 2018-01-04

## 2017-12-27 RX ORDER — DIPHENHYDRAMINE HYDROCHLORIDE 50 MG/ML
50 INJECTION INTRAMUSCULAR; INTRAVENOUS ONCE AS NEEDED
Status: CANCELLED | OUTPATIENT
Start: 2018-01-11 | End: 2018-01-11

## 2017-12-27 RX ORDER — HEPARIN 100 UNIT/ML
500 SYRINGE INTRAVENOUS
Status: CANCELLED | OUTPATIENT
Start: 2018-01-11

## 2017-12-27 RX ORDER — FAMOTIDINE 10 MG/ML
20 INJECTION INTRAVENOUS
Status: CANCELLED | OUTPATIENT
Start: 2018-01-04

## 2017-12-27 NOTE — PROGRESS NOTES
Subjective:       Patient ID: Karen Duke is a 72 y.o. female.    Chief Complaint: Results (Labs)    HPI PCP Dr. Sarina Macias.     She has newly diagnosed left breast cancer on routine screening mammogram done in 2017.      A 1.1 centimeter lobulated mass was noticed in the 8:00 position in the left breast.  Images were done at [facility].  Biopsy was done on 17.  It was high-grade invasive ductal carcinoma - ER +96%, CO +94% and HER-2/bharti IHC 3+.  Ki-67 was 37%.     Menarche occurred at age 14.  Menopause at age 51.  She still has her uterus and ovaries in situ.  She is  with age of first pregnancy at 21.  No history of hormone replacement therapy.  She took oral contraceptive pills for 20-30 years.  No history of biopsy.       The patient has a history of sickle cell trait with no manifestation.  No history of transfusion.     Underwent left breast lumpectomy on 10/12/17.     Lumpectomy specimen showed 2 foci of high grade (grade 3) invasive carcinoma - measuring 10 millimeters and 13 millimeters. Margins negative.  1 sentinel lymph node removed and it was negative.    Started Taxol/Herceptin 17.    Review of Systems   Constitutional: Negative for fever and unexpected weight change.   Respiratory: Negative for wheezing and stridor.    Gastrointestinal: Negative for abdominal pain and blood in stool.   Hematological: Negative for adenopathy. Does not bruise/bleed easily.         Objective:      Physical Exam   Constitutional: She is oriented to person, place, and time. No distress.   HENT:   Mouth/Throat: No oropharyngeal exudate.   Eyes: No scleral icterus.   Neck: Neck supple.   Cardiovascular: Normal rate.  Exam reveals no gallop.    Pulmonary/Chest: Effort normal. She has no wheezes. She has no rales.   Abdominal: Soft. There is no tenderness.   Musculoskeletal: She exhibits no edema.   Lymphadenopathy:     She has no cervical adenopathy.   Neurological: She is alert and oriented to  person, place, and time.   Skin: She is not diaphoretic.         Assessment:     1. Cancer of left breast, stage 1, estrogen receptor positive    2. Encounter for antineoplastic chemotherapy    3. Hypokalemia      Plan:   In summary this is a 72-year-old female with Stage I ER/SD/HER-2/bharti positive high-grade invasive ductal carcinoma of the left breast with Ki-67 at 37%.     The plan is to do 3 months of Taxol/Herceptin therapy followed by adjuvant radiation therapy followed by 9 months of single agent Herceptin therapy - so asked to complete a total of 1 year of Herceptin.    Started Taxol/Herceptin 11/30/17.    Proceed with 4th dose scheduled for tomorrow. Plan for 12 doses total.    Hypokalemia persistent. Increase potassium chloride to 60 mEq QD

## 2017-12-28 ENCOUNTER — INFUSION (OUTPATIENT)
Dept: INFUSION THERAPY | Facility: HOSPITAL | Age: 72
End: 2017-12-28
Attending: INTERNAL MEDICINE
Payer: MEDICARE

## 2017-12-28 VITALS
DIASTOLIC BLOOD PRESSURE: 76 MMHG | HEART RATE: 101 BPM | TEMPERATURE: 98 F | SYSTOLIC BLOOD PRESSURE: 117 MMHG | RESPIRATION RATE: 18 BRPM

## 2017-12-28 DIAGNOSIS — Z17.0 CANCER OF LEFT BREAST, STAGE 1, ESTROGEN RECEPTOR POSITIVE: ICD-10-CM

## 2017-12-28 DIAGNOSIS — C50.912 CANCER OF LEFT BREAST, STAGE 1, ESTROGEN RECEPTOR POSITIVE: ICD-10-CM

## 2017-12-28 DIAGNOSIS — Z51.11 ENCOUNTER FOR ANTINEOPLASTIC CHEMOTHERAPY: Primary | ICD-10-CM

## 2017-12-28 PROCEDURE — S0028 INJECTION, FAMOTIDINE, 20 MG: HCPCS | Performed by: INTERNAL MEDICINE

## 2017-12-28 PROCEDURE — 25000003 PHARM REV CODE 250: Performed by: INTERNAL MEDICINE

## 2017-12-28 PROCEDURE — 96375 TX/PRO/DX INJ NEW DRUG ADDON: CPT

## 2017-12-28 PROCEDURE — 96417 CHEMO IV INFUS EACH ADDL SEQ: CPT

## 2017-12-28 PROCEDURE — 96413 CHEMO IV INFUSION 1 HR: CPT

## 2017-12-28 PROCEDURE — 63600175 PHARM REV CODE 636 W HCPCS: Mod: JW | Performed by: INTERNAL MEDICINE

## 2017-12-28 RX ORDER — EPINEPHRINE 0.3 MG/.3ML
0.3 INJECTION SUBCUTANEOUS ONCE AS NEEDED
Status: DISCONTINUED | OUTPATIENT
Start: 2017-12-28 | End: 2017-12-28 | Stop reason: HOSPADM

## 2017-12-28 RX ORDER — DEXAMETHASONE SODIUM PHOSPHATE 100 MG/10ML
10 INJECTION INTRAMUSCULAR; INTRAVENOUS
Status: COMPLETED | OUTPATIENT
Start: 2017-12-28 | End: 2017-12-28

## 2017-12-28 RX ORDER — HEPARIN 100 UNIT/ML
500 SYRINGE INTRAVENOUS
Status: DISCONTINUED | OUTPATIENT
Start: 2017-12-28 | End: 2017-12-28 | Stop reason: HOSPADM

## 2017-12-28 RX ORDER — DIPHENHYDRAMINE HYDROCHLORIDE 50 MG/ML
25 INJECTION INTRAMUSCULAR; INTRAVENOUS
Status: COMPLETED | OUTPATIENT
Start: 2017-12-28 | End: 2017-12-28

## 2017-12-28 RX ORDER — SODIUM CHLORIDE 0.9 % (FLUSH) 0.9 %
10 SYRINGE (ML) INJECTION
Status: DISCONTINUED | OUTPATIENT
Start: 2017-12-28 | End: 2017-12-28 | Stop reason: HOSPADM

## 2017-12-28 RX ORDER — DIPHENHYDRAMINE HYDROCHLORIDE 50 MG/ML
50 INJECTION INTRAMUSCULAR; INTRAVENOUS ONCE AS NEEDED
Status: DISCONTINUED | OUTPATIENT
Start: 2017-12-28 | End: 2017-12-28 | Stop reason: HOSPADM

## 2017-12-28 RX ORDER — FAMOTIDINE 10 MG/ML
20 INJECTION INTRAVENOUS
Status: COMPLETED | OUTPATIENT
Start: 2017-12-28 | End: 2017-12-28

## 2017-12-28 RX ADMIN — SODIUM CHLORIDE: 900 INJECTION, SOLUTION INTRAVENOUS at 10:12

## 2017-12-28 RX ADMIN — FAMOTIDINE 20 MG: 10 INJECTION, SOLUTION INTRAVENOUS at 10:12

## 2017-12-28 RX ADMIN — HEPARIN 500 UNITS: 100 SYRINGE at 12:12

## 2017-12-28 RX ADMIN — PACLITAXEL 186 MG: 6 INJECTION, SOLUTION INTRAVENOUS at 10:12

## 2017-12-28 RX ADMIN — TRASTUZUMAB 258 MG: 150 INJECTION, POWDER, LYOPHILIZED, FOR SOLUTION INTRAVENOUS at 11:12

## 2017-12-28 RX ADMIN — DEXAMETHASONE SODIUM PHOSPHATE 10 MG: 10 INJECTION, SOLUTION INTRAMUSCULAR; INTRAVENOUS at 10:12

## 2017-12-28 RX ADMIN — DIPHENHYDRAMINE HYDROCHLORIDE 25 MG: 50 INJECTION, SOLUTION INTRAMUSCULAR; INTRAVENOUS at 10:12

## 2018-01-03 ENCOUNTER — HOSPITAL ENCOUNTER (OUTPATIENT)
Dept: CARDIOLOGY | Facility: HOSPITAL | Age: 73
Discharge: HOME | End: 2018-01-03
Attending: INTERNAL MEDICINE
Payer: MEDICARE

## 2018-01-03 DIAGNOSIS — C50.912 CANCER OF LEFT BREAST, STAGE 1, ESTROGEN RECEPTOR POSITIVE: ICD-10-CM

## 2018-01-03 DIAGNOSIS — Z17.0 CANCER OF LEFT BREAST, STAGE 1, ESTROGEN RECEPTOR POSITIVE: ICD-10-CM

## 2018-01-03 DIAGNOSIS — I35.9 NONRHEUMATIC AORTIC VALVE DISORDER: ICD-10-CM

## 2018-01-03 LAB
DIASTOLIC DYSFUNCTION: YES
ESTIMATED PA SYSTOLIC PRESSURE: 22.01
GLOBAL PERICARDIAL EFFUSION: ABNORMAL
RETIRED EF AND QEF - SEE NOTES: 65 (ref 55–65)
TRICUSPID VALVE REGURGITATION: ABNORMAL

## 2018-01-03 PROCEDURE — 93306 TTE W/DOPPLER COMPLETE: CPT

## 2018-01-03 PROCEDURE — 93306 TTE W/DOPPLER COMPLETE: CPT | Mod: 26,,, | Performed by: INTERNAL MEDICINE

## 2018-01-04 ENCOUNTER — INFUSION (OUTPATIENT)
Dept: INFUSION THERAPY | Facility: HOSPITAL | Age: 73
End: 2018-01-04
Attending: INTERNAL MEDICINE
Payer: MEDICARE

## 2018-01-04 VITALS
HEART RATE: 95 BPM | RESPIRATION RATE: 18 BRPM | WEIGHT: 284 LBS | TEMPERATURE: 99 F | DIASTOLIC BLOOD PRESSURE: 59 MMHG | HEIGHT: 61 IN | BODY MASS INDEX: 53.62 KG/M2 | SYSTOLIC BLOOD PRESSURE: 115 MMHG

## 2018-01-04 DIAGNOSIS — Z51.11 ENCOUNTER FOR ANTINEOPLASTIC CHEMOTHERAPY: Primary | ICD-10-CM

## 2018-01-04 DIAGNOSIS — Z17.0 CANCER OF LEFT BREAST, STAGE 1, ESTROGEN RECEPTOR POSITIVE: ICD-10-CM

## 2018-01-04 DIAGNOSIS — C50.912 CANCER OF LEFT BREAST, STAGE 1, ESTROGEN RECEPTOR POSITIVE: ICD-10-CM

## 2018-01-04 PROCEDURE — S0028 INJECTION, FAMOTIDINE, 20 MG: HCPCS | Performed by: INTERNAL MEDICINE

## 2018-01-04 PROCEDURE — 96413 CHEMO IV INFUSION 1 HR: CPT

## 2018-01-04 PROCEDURE — 96375 TX/PRO/DX INJ NEW DRUG ADDON: CPT

## 2018-01-04 PROCEDURE — 25000003 PHARM REV CODE 250: Performed by: INTERNAL MEDICINE

## 2018-01-04 PROCEDURE — 63600175 PHARM REV CODE 636 W HCPCS: Performed by: INTERNAL MEDICINE

## 2018-01-04 PROCEDURE — 96417 CHEMO IV INFUS EACH ADDL SEQ: CPT

## 2018-01-04 RX ORDER — HEPARIN 100 UNIT/ML
500 SYRINGE INTRAVENOUS
Status: DISCONTINUED | OUTPATIENT
Start: 2018-01-04 | End: 2018-01-04 | Stop reason: HOSPADM

## 2018-01-04 RX ORDER — DEXAMETHASONE SODIUM PHOSPHATE 100 MG/10ML
10 INJECTION INTRAMUSCULAR; INTRAVENOUS ONCE
Status: COMPLETED | OUTPATIENT
Start: 2018-01-04 | End: 2018-01-04

## 2018-01-04 RX ORDER — EPINEPHRINE 0.3 MG/.3ML
0.3 INJECTION SUBCUTANEOUS ONCE AS NEEDED
Status: DISCONTINUED | OUTPATIENT
Start: 2018-01-04 | End: 2018-01-04 | Stop reason: HOSPADM

## 2018-01-04 RX ORDER — FAMOTIDINE 10 MG/ML
20 INJECTION INTRAVENOUS
Status: COMPLETED | OUTPATIENT
Start: 2018-01-04 | End: 2018-01-04

## 2018-01-04 RX ORDER — DIPHENHYDRAMINE HYDROCHLORIDE 50 MG/ML
25 INJECTION INTRAMUSCULAR; INTRAVENOUS ONCE
Status: COMPLETED | OUTPATIENT
Start: 2018-01-04 | End: 2018-01-04

## 2018-01-04 RX ORDER — SODIUM CHLORIDE 0.9 % (FLUSH) 0.9 %
10 SYRINGE (ML) INJECTION
Status: DISCONTINUED | OUTPATIENT
Start: 2018-01-04 | End: 2018-01-04 | Stop reason: HOSPADM

## 2018-01-04 RX ORDER — DIPHENHYDRAMINE HYDROCHLORIDE 50 MG/ML
50 INJECTION INTRAMUSCULAR; INTRAVENOUS ONCE AS NEEDED
Status: DISCONTINUED | OUTPATIENT
Start: 2018-01-04 | End: 2018-01-04 | Stop reason: HOSPADM

## 2018-01-04 RX ADMIN — SODIUM CHLORIDE: 0.9 INJECTION, SOLUTION INTRAVENOUS at 09:01

## 2018-01-04 RX ADMIN — FAMOTIDINE 20 MG: 10 INJECTION, SOLUTION INTRAVENOUS at 09:01

## 2018-01-04 RX ADMIN — TRASTUZUMAB 258 MG: 150 INJECTION, POWDER, LYOPHILIZED, FOR SOLUTION INTRAVENOUS at 11:01

## 2018-01-04 RX ADMIN — DEXAMETHASONE SODIUM PHOSPHATE 10 MG: 10 INJECTION, SOLUTION INTRAMUSCULAR; INTRAVENOUS at 09:01

## 2018-01-04 RX ADMIN — DIPHENHYDRAMINE HYDROCHLORIDE 25 MG: 50 INJECTION, SOLUTION INTRAMUSCULAR; INTRAVENOUS at 09:01

## 2018-01-04 RX ADMIN — HEPARIN 500 UNITS: 100 SYRINGE at 11:01

## 2018-01-04 RX ADMIN — PACLITAXEL 186 MG: 6 INJECTION, SOLUTION INTRAVENOUS at 10:01

## 2018-01-04 NOTE — NURSING
Pt tolerated Taxol/Herceptin well. No adverse reaction noted. Pt education reinforced on chemo regimen, side effects, what to expect, and when to call . Pt verbalized understanding. I reviewed pt calendar w/ pt and understanding verbalized. PAC deaccessed and flushed w/ NS and heparin per protocol.

## 2018-01-10 ENCOUNTER — LAB VISIT (OUTPATIENT)
Dept: LAB | Facility: HOSPITAL | Age: 73
End: 2018-01-10
Attending: INTERNAL MEDICINE
Payer: MEDICARE

## 2018-01-10 ENCOUNTER — OFFICE VISIT (OUTPATIENT)
Dept: HEMATOLOGY/ONCOLOGY | Facility: CLINIC | Age: 73
End: 2018-01-10
Payer: MEDICARE

## 2018-01-10 VITALS
BODY MASS INDEX: 52.87 KG/M2 | WEIGHT: 280 LBS | HEIGHT: 61 IN | HEART RATE: 95 BPM | OXYGEN SATURATION: 97 % | SYSTOLIC BLOOD PRESSURE: 119 MMHG | TEMPERATURE: 98 F | DIASTOLIC BLOOD PRESSURE: 73 MMHG

## 2018-01-10 DIAGNOSIS — Z51.11 ENCOUNTER FOR ANTINEOPLASTIC CHEMOTHERAPY: ICD-10-CM

## 2018-01-10 DIAGNOSIS — C50.912 CANCER OF LEFT BREAST, STAGE 1, ESTROGEN RECEPTOR POSITIVE: Primary | ICD-10-CM

## 2018-01-10 DIAGNOSIS — C50.912 CANCER OF LEFT BREAST, STAGE 1, ESTROGEN RECEPTOR POSITIVE: ICD-10-CM

## 2018-01-10 DIAGNOSIS — Z17.0 CANCER OF LEFT BREAST, STAGE 1, ESTROGEN RECEPTOR POSITIVE: ICD-10-CM

## 2018-01-10 DIAGNOSIS — E87.6 HYPOKALEMIA: ICD-10-CM

## 2018-01-10 DIAGNOSIS — Z17.0 CANCER OF LEFT BREAST, STAGE 1, ESTROGEN RECEPTOR POSITIVE: Primary | ICD-10-CM

## 2018-01-10 LAB
ALBUMIN SERPL BCP-MCNC: 3.3 G/DL
ALBUMIN SERPL BCP-MCNC: 3.3 G/DL
ALP SERPL-CCNC: 61 U/L
ALP SERPL-CCNC: 61 U/L
ALT SERPL W/O P-5'-P-CCNC: 33 U/L
ALT SERPL W/O P-5'-P-CCNC: 33 U/L
ANION GAP SERPL CALC-SCNC: 9 MMOL/L
ANION GAP SERPL CALC-SCNC: 9 MMOL/L
AST SERPL-CCNC: 23 U/L
AST SERPL-CCNC: 23 U/L
BASOPHILS # BLD AUTO: 0.03 K/UL
BASOPHILS NFR BLD: 0.4 %
BILIRUB SERPL-MCNC: 0.2 MG/DL
BILIRUB SERPL-MCNC: 0.2 MG/DL
BUN SERPL-MCNC: 9 MG/DL
BUN SERPL-MCNC: 9 MG/DL
CALCIUM SERPL-MCNC: 8.8 MG/DL
CALCIUM SERPL-MCNC: 8.8 MG/DL
CHLORIDE SERPL-SCNC: 96 MMOL/L
CHLORIDE SERPL-SCNC: 96 MMOL/L
CO2 SERPL-SCNC: 32 MMOL/L
CO2 SERPL-SCNC: 32 MMOL/L
CREAT SERPL-MCNC: 0.8 MG/DL
CREAT SERPL-MCNC: 0.8 MG/DL
DIFFERENTIAL METHOD: ABNORMAL
EOSINOPHIL # BLD AUTO: 0 K/UL
EOSINOPHIL NFR BLD: 0.4 %
ERYTHROCYTE [DISTWIDTH] IN BLOOD BY AUTOMATED COUNT: 18.1 %
ERYTHROCYTE [DISTWIDTH] IN BLOOD BY AUTOMATED COUNT: 18.1 %
EST. GFR  (AFRICAN AMERICAN): >60 ML/MIN/1.73 M^2
EST. GFR  (AFRICAN AMERICAN): >60 ML/MIN/1.73 M^2
EST. GFR  (NON AFRICAN AMERICAN): >60 ML/MIN/1.73 M^2
EST. GFR  (NON AFRICAN AMERICAN): >60 ML/MIN/1.73 M^2
GLUCOSE SERPL-MCNC: 166 MG/DL
GLUCOSE SERPL-MCNC: 166 MG/DL
HCT VFR BLD AUTO: 34.7 %
HCT VFR BLD AUTO: 34.7 %
HGB BLD-MCNC: 11.1 G/DL
HGB BLD-MCNC: 11.1 G/DL
LYMPHOCYTES # BLD AUTO: 4.1 K/UL
LYMPHOCYTES NFR BLD: 57.1 %
MCH RBC QN AUTO: 24.2 PG
MCH RBC QN AUTO: 24.2 PG
MCHC RBC AUTO-ENTMCNC: 32 G/DL
MCHC RBC AUTO-ENTMCNC: 32 G/DL
MCV RBC AUTO: 76 FL
MCV RBC AUTO: 76 FL
MONOCYTES # BLD AUTO: 0.4 K/UL
MONOCYTES NFR BLD: 5.9 %
NEUTROPHILS # BLD AUTO: 2.6 K/UL
NEUTROPHILS # BLD AUTO: 2.6 K/UL
NEUTROPHILS NFR BLD: 35.4 %
PLATELET # BLD AUTO: 381 K/UL
PLATELET # BLD AUTO: 381 K/UL
PMV BLD AUTO: 9.8 FL
PMV BLD AUTO: 9.8 FL
POTASSIUM SERPL-SCNC: 3.6 MMOL/L
POTASSIUM SERPL-SCNC: 3.6 MMOL/L
PROT SERPL-MCNC: 7.3 G/DL
PROT SERPL-MCNC: 7.3 G/DL
RBC # BLD AUTO: 4.59 M/UL
RBC # BLD AUTO: 4.59 M/UL
SODIUM SERPL-SCNC: 137 MMOL/L
SODIUM SERPL-SCNC: 137 MMOL/L
WBC # BLD AUTO: 7.25 K/UL
WBC # BLD AUTO: 7.25 K/UL

## 2018-01-10 PROCEDURE — 80053 COMPREHEN METABOLIC PANEL: CPT

## 2018-01-10 PROCEDURE — 99214 OFFICE O/P EST MOD 30 MIN: CPT | Mod: S$GLB,,, | Performed by: INTERNAL MEDICINE

## 2018-01-10 PROCEDURE — 36415 COLL VENOUS BLD VENIPUNCTURE: CPT

## 2018-01-10 PROCEDURE — 85025 COMPLETE CBC W/AUTO DIFF WBC: CPT

## 2018-01-10 PROCEDURE — 99999 PR PBB SHADOW E&M-EST. PATIENT-LVL III: CPT | Mod: PBBFAC,,, | Performed by: INTERNAL MEDICINE

## 2018-01-10 RX ORDER — SODIUM CHLORIDE 0.9 % (FLUSH) 0.9 %
10 SYRINGE (ML) INJECTION
Status: CANCELLED | OUTPATIENT
Start: 2018-01-25

## 2018-01-10 RX ORDER — HEPARIN 100 UNIT/ML
500 SYRINGE INTRAVENOUS
Status: CANCELLED | OUTPATIENT
Start: 2018-01-25

## 2018-01-10 RX ORDER — FAMOTIDINE 10 MG/ML
20 INJECTION INTRAVENOUS
Status: CANCELLED | OUTPATIENT
Start: 2018-01-25

## 2018-01-10 RX ORDER — EPINEPHRINE 0.3 MG/.3ML
0.3 INJECTION SUBCUTANEOUS ONCE AS NEEDED
Status: CANCELLED | OUTPATIENT
Start: 2018-01-25 | End: 2018-01-18

## 2018-01-10 RX ORDER — DIPHENHYDRAMINE HYDROCHLORIDE 50 MG/ML
50 INJECTION INTRAMUSCULAR; INTRAVENOUS ONCE AS NEEDED
Status: CANCELLED | OUTPATIENT
Start: 2018-01-25 | End: 2018-01-18

## 2018-01-10 RX ORDER — POTASSIUM CHLORIDE 20 MEQ/1
20 TABLET, EXTENDED RELEASE ORAL 3 TIMES DAILY
Qty: 90 TABLET | Refills: 1 | Status: SHIPPED | OUTPATIENT
Start: 2018-01-10 | End: 2018-01-26 | Stop reason: SDUPTHER

## 2018-01-10 NOTE — PROGRESS NOTES
Subjective:       Patient ID: Karen Duke is a 72 y.o. female.    Chief Complaint: Breast Cancer    HPI PCP Dr. Sarina Macias.     She has newly diagnosed left breast cancer on routine screening mammogram done in 2017.      A 1.1 centimeter lobulated mass was noticed in the 8:00 position in the left breast.  Images were done at [facility].  Biopsy was done on 17.  It was high-grade invasive ductal carcinoma - ER +96%, OR +94% and HER-2/bharti IHC 3+.  Ki-67 was 37%.     Menarche occurred at age 14.  Menopause at age 51.  She still has her uterus and ovaries in situ.  She is  with age of first pregnancy at 21.  No history of hormone replacement therapy.  She took oral contraceptive pills for 20-30 years.  No history of biopsy.       The patient has a history of sickle cell trait with no manifestation.  No history of transfusion.     Underwent left breast lumpectomy on 10/12/17.     Lumpectomy specimen showed 2 foci of high grade (grade 3) invasive carcinoma - measuring 10 millimeters and 13 millimeters. Margins negative.  1 sentinel lymph node removed and it was negative.    Started Taxol/Herceptin 17.    Review of Systems   Constitutional: Negative for fever and unexpected weight change.   Respiratory: Negative for wheezing and stridor.    Gastrointestinal: Negative for abdominal pain and blood in stool.   Hematological: Negative for adenopathy. Does not bruise/bleed easily.         Objective:      Physical Exam   Constitutional: She is oriented to person, place, and time. No distress.   HENT:   Mouth/Throat: No oropharyngeal exudate.   Eyes: No scleral icterus.   Neck: Neck supple.   Cardiovascular: Normal rate.  Exam reveals no gallop.    Pulmonary/Chest: Effort normal. She has no wheezes. She has no rales.   Abdominal: Soft. There is no tenderness.   Musculoskeletal: She exhibits no edema.   Lymphadenopathy:     She has no cervical adenopathy.   Neurological: She is alert and oriented to  person, place, and time.   Skin: She is not diaphoretic.         Assessment:     1. Cancer of left breast, stage 1, estrogen receptor positive    2. Hypokalemia    3. Encounter for antineoplastic chemotherapy      Plan:   In summary this is a 72-year-old female with Stage I ER/NE/HER-2/bharti positive high-grade invasive ductal carcinoma of the left breast with Ki-67 at 37%.     The plan is to do 3 months of Taxol/Herceptin therapy followed by adjuvant radiation therapy followed by 9 months of single agent Herceptin therapy - so asked to complete a total of 1 year of Herceptin.    Started Taxol/Herceptin 11/30/17.    Proceed with 7th dose scheduled for tomorrow. Plan for 12 doses total.    Hypokalemia better - Continue potassium chloride 60 mEq QD.

## 2018-01-11 ENCOUNTER — INFUSION (OUTPATIENT)
Dept: INFUSION THERAPY | Facility: HOSPITAL | Age: 73
End: 2018-01-11
Attending: INTERNAL MEDICINE
Payer: MEDICARE

## 2018-01-11 VITALS
DIASTOLIC BLOOD PRESSURE: 69 MMHG | TEMPERATURE: 98 F | SYSTOLIC BLOOD PRESSURE: 125 MMHG | RESPIRATION RATE: 20 BRPM | HEART RATE: 101 BPM

## 2018-01-11 DIAGNOSIS — Z51.11 ENCOUNTER FOR ANTINEOPLASTIC CHEMOTHERAPY: Primary | ICD-10-CM

## 2018-01-11 DIAGNOSIS — Z17.0 CANCER OF LEFT BREAST, STAGE 1, ESTROGEN RECEPTOR POSITIVE: ICD-10-CM

## 2018-01-11 DIAGNOSIS — C50.912 CANCER OF LEFT BREAST, STAGE 1, ESTROGEN RECEPTOR POSITIVE: ICD-10-CM

## 2018-01-11 PROCEDURE — 25000003 PHARM REV CODE 250: Performed by: INTERNAL MEDICINE

## 2018-01-11 PROCEDURE — 63600175 PHARM REV CODE 636 W HCPCS: Performed by: INTERNAL MEDICINE

## 2018-01-11 PROCEDURE — 96413 CHEMO IV INFUSION 1 HR: CPT

## 2018-01-11 PROCEDURE — 96375 TX/PRO/DX INJ NEW DRUG ADDON: CPT

## 2018-01-11 PROCEDURE — S0028 INJECTION, FAMOTIDINE, 20 MG: HCPCS | Performed by: INTERNAL MEDICINE

## 2018-01-11 PROCEDURE — 96417 CHEMO IV INFUS EACH ADDL SEQ: CPT

## 2018-01-11 RX ORDER — HEPARIN 100 UNIT/ML
500 SYRINGE INTRAVENOUS
Status: DISCONTINUED | OUTPATIENT
Start: 2018-01-11 | End: 2018-01-11 | Stop reason: HOSPADM

## 2018-01-11 RX ORDER — DIPHENHYDRAMINE HYDROCHLORIDE 50 MG/ML
25 INJECTION INTRAMUSCULAR; INTRAVENOUS
Status: COMPLETED | OUTPATIENT
Start: 2018-01-11 | End: 2018-01-11

## 2018-01-11 RX ORDER — SODIUM CHLORIDE 0.9 % (FLUSH) 0.9 %
10 SYRINGE (ML) INJECTION
Status: DISCONTINUED | OUTPATIENT
Start: 2018-01-11 | End: 2018-01-11 | Stop reason: HOSPADM

## 2018-01-11 RX ORDER — DEXAMETHASONE SODIUM PHOSPHATE 100 MG/10ML
10 INJECTION INTRAMUSCULAR; INTRAVENOUS
Status: COMPLETED | OUTPATIENT
Start: 2018-01-11 | End: 2018-01-11

## 2018-01-11 RX ORDER — DIPHENHYDRAMINE HYDROCHLORIDE 50 MG/ML
50 INJECTION INTRAMUSCULAR; INTRAVENOUS ONCE AS NEEDED
Status: DISCONTINUED | OUTPATIENT
Start: 2018-01-11 | End: 2018-01-11 | Stop reason: HOSPADM

## 2018-01-11 RX ORDER — FAMOTIDINE 10 MG/ML
20 INJECTION INTRAVENOUS
Status: COMPLETED | OUTPATIENT
Start: 2018-01-11 | End: 2018-01-11

## 2018-01-11 RX ORDER — EPINEPHRINE 0.3 MG/.3ML
0.3 INJECTION SUBCUTANEOUS ONCE AS NEEDED
Status: DISCONTINUED | OUTPATIENT
Start: 2018-01-11 | End: 2018-01-11 | Stop reason: HOSPADM

## 2018-01-11 RX ADMIN — HEPARIN 500 UNITS: 100 SYRINGE at 11:01

## 2018-01-11 RX ADMIN — DEXAMETHASONE SODIUM PHOSPHATE 10 MG: 10 INJECTION, SOLUTION INTRAMUSCULAR; INTRAVENOUS at 09:01

## 2018-01-11 RX ADMIN — DIPHENHYDRAMINE HYDROCHLORIDE 25 MG: 50 INJECTION, SOLUTION INTRAMUSCULAR; INTRAVENOUS at 09:01

## 2018-01-11 RX ADMIN — FAMOTIDINE 20 MG: 10 INJECTION, SOLUTION INTRAVENOUS at 09:01

## 2018-01-11 RX ADMIN — SODIUM CHLORIDE: 0.9 INJECTION, SOLUTION INTRAVENOUS at 09:01

## 2018-01-11 RX ADMIN — PACLITAXEL 186 MG: 30 INJECTION, SOLUTION INTRAVENOUS at 10:01

## 2018-01-11 RX ADMIN — TRASTUZUMAB 254 MG: 150 INJECTION, POWDER, LYOPHILIZED, FOR SOLUTION INTRAVENOUS at 11:01

## 2018-01-11 NOTE — PLAN OF CARE
Problem: Patient Care Overview  Goal: Plan of Care Review  Outcome: Ongoing (interventions implemented as appropriate)  Pt states she feels well today, no complaints.

## 2018-01-18 ENCOUNTER — TELEPHONE (OUTPATIENT)
Dept: INFUSION THERAPY | Facility: HOSPITAL | Age: 73
End: 2018-01-18

## 2018-01-18 NOTE — TELEPHONE ENCOUNTER
Pt cancelled due to weather and icy road conditions. She is weekly and will continue her schedule next week and add a cycle on to the end to make up for today. Dr. Wilkins notified via in basket message.

## 2018-01-24 ENCOUNTER — LAB VISIT (OUTPATIENT)
Dept: LAB | Facility: HOSPITAL | Age: 73
End: 2018-01-24
Attending: INTERNAL MEDICINE
Payer: MEDICARE

## 2018-01-24 ENCOUNTER — OFFICE VISIT (OUTPATIENT)
Dept: HEMATOLOGY/ONCOLOGY | Facility: CLINIC | Age: 73
End: 2018-01-24
Payer: MEDICARE

## 2018-01-24 VITALS
SYSTOLIC BLOOD PRESSURE: 143 MMHG | TEMPERATURE: 98 F | BODY MASS INDEX: 53.61 KG/M2 | DIASTOLIC BLOOD PRESSURE: 84 MMHG | OXYGEN SATURATION: 99 % | WEIGHT: 283.94 LBS | HEIGHT: 61 IN | HEART RATE: 116 BPM

## 2018-01-24 DIAGNOSIS — Z17.0 CANCER OF LEFT BREAST, STAGE 1, ESTROGEN RECEPTOR POSITIVE: Primary | ICD-10-CM

## 2018-01-24 DIAGNOSIS — Z17.0 CANCER OF LEFT BREAST, STAGE 1, ESTROGEN RECEPTOR POSITIVE: ICD-10-CM

## 2018-01-24 DIAGNOSIS — Z51.11 ENCOUNTER FOR ANTINEOPLASTIC CHEMOTHERAPY: ICD-10-CM

## 2018-01-24 DIAGNOSIS — E87.6 HYPOKALEMIA: ICD-10-CM

## 2018-01-24 DIAGNOSIS — C50.912 CANCER OF LEFT BREAST, STAGE 1, ESTROGEN RECEPTOR POSITIVE: Primary | ICD-10-CM

## 2018-01-24 DIAGNOSIS — C50.912 CANCER OF LEFT BREAST, STAGE 1, ESTROGEN RECEPTOR POSITIVE: ICD-10-CM

## 2018-01-24 LAB
ALBUMIN SERPL BCP-MCNC: 3.3 G/DL
ALP SERPL-CCNC: 60 U/L
ALT SERPL W/O P-5'-P-CCNC: 29 U/L
ANION GAP SERPL CALC-SCNC: 12 MMOL/L
AST SERPL-CCNC: 24 U/L
BILIRUB SERPL-MCNC: 0.2 MG/DL
BUN SERPL-MCNC: 11 MG/DL
CALCIUM SERPL-MCNC: 9.5 MG/DL
CHLORIDE SERPL-SCNC: 98 MMOL/L
CO2 SERPL-SCNC: 32 MMOL/L
CREAT SERPL-MCNC: 0.9 MG/DL
ERYTHROCYTE [DISTWIDTH] IN BLOOD BY AUTOMATED COUNT: 18.9 %
EST. GFR  (AFRICAN AMERICAN): >60 ML/MIN/1.73 M^2
EST. GFR  (NON AFRICAN AMERICAN): >60 ML/MIN/1.73 M^2
GLUCOSE SERPL-MCNC: 174 MG/DL
HCT VFR BLD AUTO: 34.5 %
HGB BLD-MCNC: 10.8 G/DL
MCH RBC QN AUTO: 24.1 PG
MCHC RBC AUTO-ENTMCNC: 31.3 G/DL
MCV RBC AUTO: 77 FL
NEUTROPHILS # BLD AUTO: 4.1 K/UL
PLATELET # BLD AUTO: 326 K/UL
PMV BLD AUTO: 9.4 FL
POTASSIUM SERPL-SCNC: 3.1 MMOL/L
PROT SERPL-MCNC: 7.3 G/DL
RBC # BLD AUTO: 4.48 M/UL
SODIUM SERPL-SCNC: 142 MMOL/L
WBC # BLD AUTO: 10.85 K/UL

## 2018-01-24 PROCEDURE — 80053 COMPREHEN METABOLIC PANEL: CPT

## 2018-01-24 PROCEDURE — 99214 OFFICE O/P EST MOD 30 MIN: CPT | Mod: S$GLB,,, | Performed by: INTERNAL MEDICINE

## 2018-01-24 PROCEDURE — 36415 COLL VENOUS BLD VENIPUNCTURE: CPT

## 2018-01-24 PROCEDURE — 99999 PR PBB SHADOW E&M-EST. PATIENT-LVL III: CPT | Mod: PBBFAC,,, | Performed by: INTERNAL MEDICINE

## 2018-01-24 PROCEDURE — 85027 COMPLETE CBC AUTOMATED: CPT

## 2018-01-24 RX ORDER — DIPHENHYDRAMINE HYDROCHLORIDE 50 MG/ML
50 INJECTION INTRAMUSCULAR; INTRAVENOUS ONCE AS NEEDED
Status: CANCELLED | OUTPATIENT
Start: 2018-02-01 | End: 2018-01-25

## 2018-01-24 RX ORDER — EPINEPHRINE 0.3 MG/.3ML
0.3 INJECTION SUBCUTANEOUS ONCE AS NEEDED
Status: CANCELLED | OUTPATIENT
Start: 2018-02-01 | End: 2018-01-25

## 2018-01-24 RX ORDER — HEPARIN 100 UNIT/ML
500 SYRINGE INTRAVENOUS
Status: CANCELLED | OUTPATIENT
Start: 2018-02-01

## 2018-01-24 RX ORDER — FAMOTIDINE 10 MG/ML
20 INJECTION INTRAVENOUS
Status: CANCELLED | OUTPATIENT
Start: 2018-02-01

## 2018-01-24 RX ORDER — SODIUM CHLORIDE 0.9 % (FLUSH) 0.9 %
10 SYRINGE (ML) INJECTION
Status: CANCELLED | OUTPATIENT
Start: 2018-02-08

## 2018-01-24 RX ORDER — DIPHENHYDRAMINE HYDROCHLORIDE 50 MG/ML
50 INJECTION INTRAMUSCULAR; INTRAVENOUS ONCE AS NEEDED
Status: CANCELLED | OUTPATIENT
Start: 2018-02-08 | End: 2018-02-08

## 2018-01-24 RX ORDER — FAMOTIDINE 10 MG/ML
20 INJECTION INTRAVENOUS
Status: CANCELLED | OUTPATIENT
Start: 2018-02-08

## 2018-01-24 RX ORDER — EPINEPHRINE 0.3 MG/.3ML
0.3 INJECTION SUBCUTANEOUS ONCE AS NEEDED
Status: CANCELLED | OUTPATIENT
Start: 2018-02-08 | End: 2018-02-08

## 2018-01-24 RX ORDER — SODIUM CHLORIDE 0.9 % (FLUSH) 0.9 %
10 SYRINGE (ML) INJECTION
Status: CANCELLED | OUTPATIENT
Start: 2018-02-01

## 2018-01-24 RX ORDER — HEPARIN 100 UNIT/ML
500 SYRINGE INTRAVENOUS
Status: CANCELLED | OUTPATIENT
Start: 2018-02-08

## 2018-01-24 NOTE — PROGRESS NOTES
Subjective:       Patient ID: Karen Duke is a 72 y.o. female.    Chief Complaint: Breast Cancer    HPI PCP Dr. Sarina Macias.     She has newly diagnosed left breast cancer on routine screening mammogram done in 2017.      A 1.1 centimeter lobulated mass was noticed in the 8:00 position in the left breast.  Images were done at [facility].  Biopsy was done on 17.  It was high-grade invasive ductal carcinoma - ER +96%, OK +94% and HER-2/bharti IHC 3+.  Ki-67 was 37%.     Menarche occurred at age 14.  Menopause at age 51.  She still has her uterus and ovaries in situ.  She is  with age of first pregnancy at 21.  No history of hormone replacement therapy.  She took oral contraceptive pills for 20-30 years.  No history of biopsy.       The patient has a history of sickle cell trait with no manifestation.  No history of transfusion.     Underwent left breast lumpectomy on 10/12/17.     Lumpectomy specimen showed 2 foci of high grade (grade 3) invasive carcinoma - measuring 10 millimeters and 13 millimeters. Margins negative.  1 sentinel lymph node removed and it was negative.    Started Taxol/Herceptin 17.    Review of Systems   Constitutional: Negative for fever and unexpected weight change.   Respiratory: Negative for wheezing and stridor.    Gastrointestinal: Negative for abdominal pain and blood in stool.   Hematological: Negative for adenopathy. Does not bruise/bleed easily.         Objective:      Physical Exam   Constitutional: She is oriented to person, place, and time. No distress.   HENT:   Mouth/Throat: No oropharyngeal exudate.   Eyes: No scleral icterus.   Neck: Neck supple.   Cardiovascular: Normal rate.  Exam reveals no gallop.    Pulmonary/Chest: Effort normal. She has no wheezes. She has no rales.   Abdominal: Soft. There is no tenderness.   Musculoskeletal: She exhibits no edema.   Lymphadenopathy:     She has no cervical adenopathy.   Neurological: She is alert and oriented to  person, place, and time.   Skin: She is not diaphoretic.         Assessment:     1. Cancer of left breast, stage 1, estrogen receptor positive    2. Hypokalemia    3. Encounter for antineoplastic chemotherapy      Plan:   In summary this is a 72-year-old female with Stage I ER/MO/HER-2/bharti positive high-grade invasive ductal carcinoma of the left breast with Ki-67 at 37%.     The plan is to do 3 months of Taxol/Herceptin therapy followed by adjuvant radiation therapy followed by 9 months of single agent Herceptin therapy - so asked to complete a total of 1 year of Herceptin.    Started Taxol/Herceptin 11/30/17.    Proceed with 8th dose scheduled for tomorrow. Plan for 12 doses total.    Hypokalemia persists - Continue potassium chloride 60 mEq QD.

## 2018-01-25 ENCOUNTER — INFUSION (OUTPATIENT)
Dept: INFUSION THERAPY | Facility: HOSPITAL | Age: 73
End: 2018-01-25
Attending: INTERNAL MEDICINE
Payer: MEDICARE

## 2018-01-25 VITALS
HEART RATE: 119 BPM | TEMPERATURE: 98 F | RESPIRATION RATE: 20 BRPM | SYSTOLIC BLOOD PRESSURE: 131 MMHG | DIASTOLIC BLOOD PRESSURE: 74 MMHG

## 2018-01-25 DIAGNOSIS — Z17.0 CANCER OF LEFT BREAST, STAGE 1, ESTROGEN RECEPTOR POSITIVE: ICD-10-CM

## 2018-01-25 DIAGNOSIS — Z51.11 ENCOUNTER FOR ANTINEOPLASTIC CHEMOTHERAPY: Primary | ICD-10-CM

## 2018-01-25 DIAGNOSIS — C50.912 CANCER OF LEFT BREAST, STAGE 1, ESTROGEN RECEPTOR POSITIVE: ICD-10-CM

## 2018-01-25 PROCEDURE — S0028 INJECTION, FAMOTIDINE, 20 MG: HCPCS | Performed by: INTERNAL MEDICINE

## 2018-01-25 PROCEDURE — 96413 CHEMO IV INFUSION 1 HR: CPT

## 2018-01-25 PROCEDURE — 63600175 PHARM REV CODE 636 W HCPCS: Performed by: INTERNAL MEDICINE

## 2018-01-25 PROCEDURE — 96417 CHEMO IV INFUS EACH ADDL SEQ: CPT

## 2018-01-25 PROCEDURE — 96375 TX/PRO/DX INJ NEW DRUG ADDON: CPT

## 2018-01-25 PROCEDURE — 25000003 PHARM REV CODE 250: Performed by: INTERNAL MEDICINE

## 2018-01-25 RX ORDER — FAMOTIDINE 10 MG/ML
20 INJECTION INTRAVENOUS
Status: COMPLETED | OUTPATIENT
Start: 2018-01-25 | End: 2018-01-25

## 2018-01-25 RX ORDER — DIPHENHYDRAMINE HYDROCHLORIDE 50 MG/ML
25 INJECTION INTRAMUSCULAR; INTRAVENOUS ONCE
Status: COMPLETED | OUTPATIENT
Start: 2018-01-25 | End: 2018-01-25

## 2018-01-25 RX ORDER — HEPARIN 100 UNIT/ML
500 SYRINGE INTRAVENOUS
Status: DISCONTINUED | OUTPATIENT
Start: 2018-01-25 | End: 2018-01-25 | Stop reason: HOSPADM

## 2018-01-25 RX ORDER — DIPHENHYDRAMINE HYDROCHLORIDE 50 MG/ML
50 INJECTION INTRAMUSCULAR; INTRAVENOUS ONCE AS NEEDED
Status: DISCONTINUED | OUTPATIENT
Start: 2018-01-25 | End: 2018-01-25 | Stop reason: HOSPADM

## 2018-01-25 RX ORDER — EPINEPHRINE 0.3 MG/.3ML
0.3 INJECTION SUBCUTANEOUS ONCE AS NEEDED
Status: DISCONTINUED | OUTPATIENT
Start: 2018-01-25 | End: 2018-01-25 | Stop reason: HOSPADM

## 2018-01-25 RX ORDER — DEXAMETHASONE SODIUM PHOSPHATE 100 MG/10ML
10 INJECTION INTRAMUSCULAR; INTRAVENOUS ONCE
Status: COMPLETED | OUTPATIENT
Start: 2018-01-25 | End: 2018-01-25

## 2018-01-25 RX ORDER — SODIUM CHLORIDE 0.9 % (FLUSH) 0.9 %
10 SYRINGE (ML) INJECTION
Status: DISCONTINUED | OUTPATIENT
Start: 2018-01-25 | End: 2018-01-25 | Stop reason: HOSPADM

## 2018-01-25 RX ADMIN — TRASTUZUMAB 258 MG: 150 INJECTION, POWDER, LYOPHILIZED, FOR SOLUTION INTRAVENOUS at 12:01

## 2018-01-25 RX ADMIN — SODIUM CHLORIDE: 900 INJECTION, SOLUTION INTRAVENOUS at 10:01

## 2018-01-25 RX ADMIN — ALUMINUM HYDROXIDE AND MAGNESIUM HYDROXIDE 30 ML: 200; 200 SUSPENSION ORAL at 01:01

## 2018-01-25 RX ADMIN — DIPHENHYDRAMINE HYDROCHLORIDE 25 MG: 50 INJECTION, SOLUTION INTRAMUSCULAR; INTRAVENOUS at 10:01

## 2018-01-25 RX ADMIN — HEPARIN 500 UNITS: 100 SYRINGE at 12:01

## 2018-01-25 RX ADMIN — PACLITAXEL 186 MG: 6 INJECTION, SOLUTION INTRAVENOUS at 11:01

## 2018-01-25 RX ADMIN — FAMOTIDINE 20 MG: 10 INJECTION, SOLUTION INTRAVENOUS at 10:01

## 2018-01-25 RX ADMIN — DEXAMETHASONE SODIUM PHOSPHATE 10 MG: 10 INJECTION, SOLUTION INTRAMUSCULAR; INTRAVENOUS at 10:01

## 2018-01-25 NOTE — NURSING
Pt tolerated Taxol/Herceptin well. No adverse reaction noted. Pt education reinforced on chemo regimen, side effects, what to expect, and when to call . Pt verbalized understanding. I reviewed pt calendar w/ pt and understanding verbalized. Right PAC deaccessed and flushed w/ NS and heparin per protocol.

## 2018-01-25 NOTE — PLAN OF CARE
Problem: Patient Care Overview  Goal: Plan of Care Review  Outcome: Ongoing (interventions implemented as appropriate)  Pt states she feels well today.  Only complaint is left hand/fingers are starting to get numb with tingling.  MD aware.

## 2018-01-25 NOTE — NURSING
1305 pt started C/O chest pain and discomfort in her throat 5/10, RX=[id number], ID=[id number].  Dr Wilkins notified and ordered maaloxx and monitor patient.  Pt now states the chest pain is down.  Will monitor for any further symptoms.

## 2018-01-26 DIAGNOSIS — E87.6 HYPOKALEMIA: ICD-10-CM

## 2018-01-26 RX ORDER — POTASSIUM CHLORIDE 20 MEQ/1
TABLET, EXTENDED RELEASE ORAL
Qty: 90 TABLET | Refills: 0 | Status: SHIPPED | OUTPATIENT
Start: 2018-01-26 | End: 2018-05-28 | Stop reason: SDUPTHER

## 2018-01-31 ENCOUNTER — LAB VISIT (OUTPATIENT)
Dept: LAB | Facility: HOSPITAL | Age: 73
End: 2018-01-31
Attending: INTERNAL MEDICINE
Payer: MEDICARE

## 2018-01-31 DIAGNOSIS — Z17.0 CANCER OF LEFT BREAST, STAGE 1, ESTROGEN RECEPTOR POSITIVE: ICD-10-CM

## 2018-01-31 DIAGNOSIS — C50.912 CANCER OF LEFT BREAST, STAGE 1, ESTROGEN RECEPTOR POSITIVE: ICD-10-CM

## 2018-01-31 LAB
ALBUMIN SERPL BCP-MCNC: 3.5 G/DL
ALP SERPL-CCNC: 58 U/L
ALT SERPL W/O P-5'-P-CCNC: 31 U/L
ANION GAP SERPL CALC-SCNC: 9 MMOL/L
AST SERPL-CCNC: 21 U/L
BILIRUB SERPL-MCNC: 0.3 MG/DL
BUN SERPL-MCNC: 9 MG/DL
CALCIUM SERPL-MCNC: 9.7 MG/DL
CHLORIDE SERPL-SCNC: 98 MMOL/L
CO2 SERPL-SCNC: 36 MMOL/L
CREAT SERPL-MCNC: 0.8 MG/DL
ERYTHROCYTE [DISTWIDTH] IN BLOOD BY AUTOMATED COUNT: 18.9 %
EST. GFR  (AFRICAN AMERICAN): >60 ML/MIN/1.73 M^2
EST. GFR  (NON AFRICAN AMERICAN): >60 ML/MIN/1.73 M^2
GLUCOSE SERPL-MCNC: 109 MG/DL
HCT VFR BLD AUTO: 35.3 %
HGB BLD-MCNC: 11.2 G/DL
MCH RBC QN AUTO: 24.6 PG
MCHC RBC AUTO-ENTMCNC: 31.7 G/DL
MCV RBC AUTO: 77 FL
NEUTROPHILS # BLD AUTO: 4.1 K/UL
PLATELET # BLD AUTO: 333 K/UL
PMV BLD AUTO: 9.8 FL
POTASSIUM SERPL-SCNC: 3.4 MMOL/L
PROT SERPL-MCNC: 7.6 G/DL
RBC # BLD AUTO: 4.56 M/UL
SODIUM SERPL-SCNC: 143 MMOL/L
WBC # BLD AUTO: 10.84 K/UL

## 2018-01-31 PROCEDURE — 80053 COMPREHEN METABOLIC PANEL: CPT

## 2018-01-31 PROCEDURE — 36415 COLL VENOUS BLD VENIPUNCTURE: CPT

## 2018-01-31 PROCEDURE — 85027 COMPLETE CBC AUTOMATED: CPT

## 2018-02-01 ENCOUNTER — INFUSION (OUTPATIENT)
Dept: INFUSION THERAPY | Facility: HOSPITAL | Age: 73
End: 2018-02-01
Attending: INTERNAL MEDICINE
Payer: MEDICARE

## 2018-02-01 VITALS
HEART RATE: 113 BPM | RESPIRATION RATE: 20 BRPM | WEIGHT: 283 LBS | DIASTOLIC BLOOD PRESSURE: 60 MMHG | HEIGHT: 61 IN | BODY MASS INDEX: 53.43 KG/M2 | TEMPERATURE: 98 F | SYSTOLIC BLOOD PRESSURE: 134 MMHG

## 2018-02-01 DIAGNOSIS — C50.912 CANCER OF LEFT BREAST, STAGE 1, ESTROGEN RECEPTOR POSITIVE: ICD-10-CM

## 2018-02-01 DIAGNOSIS — Z17.0 CANCER OF LEFT BREAST, STAGE 1, ESTROGEN RECEPTOR POSITIVE: ICD-10-CM

## 2018-02-01 DIAGNOSIS — Z51.11 ENCOUNTER FOR ANTINEOPLASTIC CHEMOTHERAPY: Primary | ICD-10-CM

## 2018-02-01 PROCEDURE — S0028 INJECTION, FAMOTIDINE, 20 MG: HCPCS | Performed by: INTERNAL MEDICINE

## 2018-02-01 PROCEDURE — 25000003 PHARM REV CODE 250: Performed by: INTERNAL MEDICINE

## 2018-02-01 PROCEDURE — 96375 TX/PRO/DX INJ NEW DRUG ADDON: CPT

## 2018-02-01 PROCEDURE — 96413 CHEMO IV INFUSION 1 HR: CPT

## 2018-02-01 PROCEDURE — 63600175 PHARM REV CODE 636 W HCPCS: Performed by: INTERNAL MEDICINE

## 2018-02-01 PROCEDURE — 96417 CHEMO IV INFUS EACH ADDL SEQ: CPT

## 2018-02-01 RX ORDER — DIPHENHYDRAMINE HYDROCHLORIDE 50 MG/ML
50 INJECTION INTRAMUSCULAR; INTRAVENOUS ONCE AS NEEDED
Status: DISCONTINUED | OUTPATIENT
Start: 2018-02-01 | End: 2018-02-01 | Stop reason: HOSPADM

## 2018-02-01 RX ORDER — DEXAMETHASONE SODIUM PHOSPHATE 100 MG/10ML
10 INJECTION INTRAMUSCULAR; INTRAVENOUS
Status: COMPLETED | OUTPATIENT
Start: 2018-02-01 | End: 2018-02-01

## 2018-02-01 RX ORDER — SODIUM CHLORIDE 0.9 % (FLUSH) 0.9 %
10 SYRINGE (ML) INJECTION
Status: DISCONTINUED | OUTPATIENT
Start: 2018-02-01 | End: 2018-02-01 | Stop reason: HOSPADM

## 2018-02-01 RX ORDER — EPINEPHRINE 0.3 MG/.3ML
0.3 INJECTION SUBCUTANEOUS ONCE AS NEEDED
Status: DISCONTINUED | OUTPATIENT
Start: 2018-02-01 | End: 2018-02-01 | Stop reason: HOSPADM

## 2018-02-01 RX ORDER — DIPHENHYDRAMINE HYDROCHLORIDE 50 MG/ML
25 INJECTION INTRAMUSCULAR; INTRAVENOUS
Status: COMPLETED | OUTPATIENT
Start: 2018-02-01 | End: 2018-02-01

## 2018-02-01 RX ORDER — FAMOTIDINE 10 MG/ML
20 INJECTION INTRAVENOUS
Status: COMPLETED | OUTPATIENT
Start: 2018-02-01 | End: 2018-02-01

## 2018-02-01 RX ORDER — HEPARIN 100 UNIT/ML
500 SYRINGE INTRAVENOUS
Status: DISCONTINUED | OUTPATIENT
Start: 2018-02-01 | End: 2018-02-01 | Stop reason: HOSPADM

## 2018-02-01 RX ADMIN — TRASTUZUMAB 257 MG: 150 INJECTION, POWDER, LYOPHILIZED, FOR SOLUTION INTRAVENOUS at 12:02

## 2018-02-01 RX ADMIN — HEPARIN 500 UNITS: 100 SYRINGE at 01:02

## 2018-02-01 RX ADMIN — DIPHENHYDRAMINE HYDROCHLORIDE 25 MG: 50 INJECTION, SOLUTION INTRAMUSCULAR; INTRAVENOUS at 10:02

## 2018-02-01 RX ADMIN — FAMOTIDINE 20 MG: 10 INJECTION, SOLUTION INTRAVENOUS at 10:02

## 2018-02-01 RX ADMIN — DEXAMETHASONE SODIUM PHOSPHATE 10 MG: 10 INJECTION, SOLUTION INTRAMUSCULAR; INTRAVENOUS at 10:02

## 2018-02-01 RX ADMIN — SODIUM CHLORIDE: 0.9 INJECTION, SOLUTION INTRAVENOUS at 10:02

## 2018-02-01 RX ADMIN — PACLITAXEL 186 MG: 30 INJECTION, SOLUTION INTRAVENOUS at 11:02

## 2018-02-07 ENCOUNTER — LAB VISIT (OUTPATIENT)
Dept: LAB | Facility: HOSPITAL | Age: 73
End: 2018-02-07
Attending: INTERNAL MEDICINE
Payer: MEDICARE

## 2018-02-07 ENCOUNTER — OFFICE VISIT (OUTPATIENT)
Dept: HEMATOLOGY/ONCOLOGY | Facility: CLINIC | Age: 73
End: 2018-02-07
Payer: MEDICARE

## 2018-02-07 VITALS
SYSTOLIC BLOOD PRESSURE: 121 MMHG | HEIGHT: 61 IN | BODY MASS INDEX: 53.41 KG/M2 | HEART RATE: 102 BPM | TEMPERATURE: 98 F | DIASTOLIC BLOOD PRESSURE: 77 MMHG | OXYGEN SATURATION: 99 % | WEIGHT: 282.88 LBS

## 2018-02-07 DIAGNOSIS — Z17.0 CANCER OF LEFT BREAST, STAGE 1, ESTROGEN RECEPTOR POSITIVE: ICD-10-CM

## 2018-02-07 DIAGNOSIS — G62.0 NEUROPATHY DUE TO CHEMOTHERAPEUTIC DRUG: ICD-10-CM

## 2018-02-07 DIAGNOSIS — C50.912 CANCER OF LEFT BREAST, STAGE 1, ESTROGEN RECEPTOR POSITIVE: Primary | ICD-10-CM

## 2018-02-07 DIAGNOSIS — T45.1X5A NEUROPATHY DUE TO CHEMOTHERAPEUTIC DRUG: ICD-10-CM

## 2018-02-07 DIAGNOSIS — Z17.0 CANCER OF LEFT BREAST, STAGE 1, ESTROGEN RECEPTOR POSITIVE: Primary | ICD-10-CM

## 2018-02-07 DIAGNOSIS — C50.912 CANCER OF LEFT BREAST, STAGE 1, ESTROGEN RECEPTOR POSITIVE: ICD-10-CM

## 2018-02-07 DIAGNOSIS — E87.6 HYPOKALEMIA: ICD-10-CM

## 2018-02-07 DIAGNOSIS — Z51.11 ENCOUNTER FOR ANTINEOPLASTIC CHEMOTHERAPY: ICD-10-CM

## 2018-02-07 LAB
ALBUMIN SERPL BCP-MCNC: 3.3 G/DL
ALP SERPL-CCNC: 58 U/L
ALT SERPL W/O P-5'-P-CCNC: 32 U/L
ANION GAP SERPL CALC-SCNC: 10 MMOL/L
AST SERPL-CCNC: 21 U/L
BILIRUB SERPL-MCNC: 0.3 MG/DL
BUN SERPL-MCNC: 9 MG/DL
CALCIUM SERPL-MCNC: 9.4 MG/DL
CHLORIDE SERPL-SCNC: 97 MMOL/L
CO2 SERPL-SCNC: 33 MMOL/L
CREAT SERPL-MCNC: 0.8 MG/DL
ERYTHROCYTE [DISTWIDTH] IN BLOOD BY AUTOMATED COUNT: 19.1 %
EST. GFR  (AFRICAN AMERICAN): >60 ML/MIN/1.73 M^2
EST. GFR  (NON AFRICAN AMERICAN): >60 ML/MIN/1.73 M^2
GLUCOSE SERPL-MCNC: 140 MG/DL
HCT VFR BLD AUTO: 33.8 %
HGB BLD-MCNC: 10.8 G/DL
MCH RBC QN AUTO: 24.8 PG
MCHC RBC AUTO-ENTMCNC: 32 G/DL
MCV RBC AUTO: 78 FL
NEUTROPHILS # BLD AUTO: 4.7 K/UL
PLATELET # BLD AUTO: 333 K/UL
PMV BLD AUTO: 9.8 FL
POTASSIUM SERPL-SCNC: 3.3 MMOL/L
PROT SERPL-MCNC: 7.3 G/DL
RBC # BLD AUTO: 4.35 M/UL
SODIUM SERPL-SCNC: 140 MMOL/L
WBC # BLD AUTO: 9.71 K/UL

## 2018-02-07 PROCEDURE — 99214 OFFICE O/P EST MOD 30 MIN: CPT | Mod: S$GLB,,, | Performed by: INTERNAL MEDICINE

## 2018-02-07 PROCEDURE — 3008F BODY MASS INDEX DOCD: CPT | Mod: S$GLB,,, | Performed by: INTERNAL MEDICINE

## 2018-02-07 PROCEDURE — 36415 COLL VENOUS BLD VENIPUNCTURE: CPT

## 2018-02-07 PROCEDURE — 99999 PR PBB SHADOW E&M-EST. PATIENT-LVL III: CPT | Mod: PBBFAC,,, | Performed by: INTERNAL MEDICINE

## 2018-02-07 PROCEDURE — 80053 COMPREHEN METABOLIC PANEL: CPT

## 2018-02-07 PROCEDURE — 1159F MED LIST DOCD IN RCRD: CPT | Mod: S$GLB,,, | Performed by: INTERNAL MEDICINE

## 2018-02-07 PROCEDURE — 85027 COMPLETE CBC AUTOMATED: CPT

## 2018-02-07 RX ORDER — EPINEPHRINE 0.3 MG/.3ML
0.3 INJECTION SUBCUTANEOUS ONCE AS NEEDED
Status: CANCELLED | OUTPATIENT
Start: 2018-02-15 | End: 2018-02-15

## 2018-02-07 RX ORDER — SODIUM CHLORIDE 0.9 % (FLUSH) 0.9 %
10 SYRINGE (ML) INJECTION
Status: CANCELLED | OUTPATIENT
Start: 2018-02-15

## 2018-02-07 RX ORDER — SODIUM CHLORIDE 0.9 % (FLUSH) 0.9 %
10 SYRINGE (ML) INJECTION
Status: CANCELLED | OUTPATIENT
Start: 2018-02-22

## 2018-02-07 RX ORDER — EPINEPHRINE 0.3 MG/.3ML
0.3 INJECTION SUBCUTANEOUS ONCE AS NEEDED
Status: CANCELLED | OUTPATIENT
Start: 2018-02-22 | End: 2018-02-22

## 2018-02-07 RX ORDER — DIPHENHYDRAMINE HYDROCHLORIDE 50 MG/ML
50 INJECTION INTRAMUSCULAR; INTRAVENOUS ONCE AS NEEDED
Status: CANCELLED | OUTPATIENT
Start: 2018-02-22 | End: 2018-02-22

## 2018-02-07 RX ORDER — HEPARIN 100 UNIT/ML
500 SYRINGE INTRAVENOUS
Status: CANCELLED | OUTPATIENT
Start: 2018-02-15

## 2018-02-07 RX ORDER — HEPARIN 100 UNIT/ML
500 SYRINGE INTRAVENOUS
Status: CANCELLED | OUTPATIENT
Start: 2018-02-22

## 2018-02-07 RX ORDER — FAMOTIDINE 10 MG/ML
20 INJECTION INTRAVENOUS
Status: CANCELLED | OUTPATIENT
Start: 2018-02-15

## 2018-02-07 RX ORDER — FAMOTIDINE 10 MG/ML
20 INJECTION INTRAVENOUS
Status: CANCELLED | OUTPATIENT
Start: 2018-02-22

## 2018-02-07 RX ORDER — DIPHENHYDRAMINE HYDROCHLORIDE 50 MG/ML
50 INJECTION INTRAMUSCULAR; INTRAVENOUS ONCE AS NEEDED
Status: CANCELLED | OUTPATIENT
Start: 2018-02-15 | End: 2018-02-15

## 2018-02-07 NOTE — PROGRESS NOTES
Subjective:       Patient ID: Karen Duke is a 72 y.o. female.    Chief Complaint: Breast Cancer    HPI PCP Dr. Sarina Macias.     She has newly diagnosed left breast cancer on routine screening mammogram done in 2017.      A 1.1 centimeter lobulated mass was noticed in the 8:00 position in the left breast.  Images were done at [facility].  Biopsy was done on 17.  It was high-grade invasive ductal carcinoma - ER +96%, IN +94% and HER-2/bharti IHC 3+.  Ki-67 was 37%.     Menarche occurred at age 14.  Menopause at age 51.  She still has her uterus and ovaries in situ.  She is  with age of first pregnancy at 21.  No history of hormone replacement therapy.  She took oral contraceptive pills for 20-30 years.  No history of biopsy.       The patient has a history of sickle cell trait with no manifestation.  No history of transfusion.     Underwent left breast lumpectomy on 10/12/17.     Lumpectomy specimen showed 2 foci of high grade (grade 3) invasive carcinoma - measuring 10 millimeters and 13 millimeters. Margins negative.  1 sentinel lymph node removed and it was negative.    Started Taxol/Herceptin 17.    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    Review of Systems   Constitutional: Negative for fever and unexpected weight change.   Respiratory: Negative for wheezing and stridor.    Gastrointestinal: Negative for abdominal pain and blood in stool.   Hematological: Negative for adenopathy. Does not bruise/bleed easily.         Objective:      Physical Exam   Constitutional: She is oriented to person, place, and time. No distress.   HENT:   Mouth/Throat: No oropharyngeal exudate.   Eyes: No scleral icterus.   Neck: Neck supple.   Cardiovascular: Normal rate.  Exam reveals no gallop.    Pulmonary/Chest: Effort normal. She has no wheezes. She has no rales.   Abdominal: Soft. There is no tenderness.   Musculoskeletal: She exhibits no edema.   Lymphadenopathy:     She has no cervical adenopathy.    Neurological: She is alert and oriented to person, place, and time.   Skin: She is not diaphoretic.         Assessment:     1. Cancer of left breast, stage 1, estrogen receptor positive    2. Hypokalemia    3. Encounter for antineoplastic chemotherapy      Plan:   In summary this is a 72-year-old female with Stage I ER/MO/HER-2/bharti positive high-grade invasive ductal carcinoma of the left breast with Ki-67 at 37%.     The plan is to do 3 months of Taxol/Herceptin therapy followed by adjuvant radiation therapy followed by 9 months of single agent Herceptin therapy - so asked to complete a total of 1 year of Herceptin.    Started Taxol/Herceptin 11/30/17.    Proceed with 10th dose scheduled for tomorrow. Plan for 12 doses total.    Hypokalemia persists - Continue potassium chloride 60 mEq QD.    Consult [facility] for adjuvant RT after completion of chemotherapy

## 2018-02-08 ENCOUNTER — INFUSION (OUTPATIENT)
Dept: INFUSION THERAPY | Facility: HOSPITAL | Age: 73
End: 2018-02-08
Attending: INTERNAL MEDICINE
Payer: MEDICARE

## 2018-02-08 VITALS
TEMPERATURE: 98 F | RESPIRATION RATE: 20 BRPM | DIASTOLIC BLOOD PRESSURE: 66 MMHG | HEART RATE: 103 BPM | SYSTOLIC BLOOD PRESSURE: 102 MMHG

## 2018-02-08 DIAGNOSIS — Z51.11 ENCOUNTER FOR ANTINEOPLASTIC CHEMOTHERAPY: Primary | ICD-10-CM

## 2018-02-08 DIAGNOSIS — C50.912 CANCER OF LEFT BREAST, STAGE 1, ESTROGEN RECEPTOR POSITIVE: ICD-10-CM

## 2018-02-08 DIAGNOSIS — Z17.0 CANCER OF LEFT BREAST, STAGE 1, ESTROGEN RECEPTOR POSITIVE: ICD-10-CM

## 2018-02-08 PROCEDURE — 96417 CHEMO IV INFUS EACH ADDL SEQ: CPT

## 2018-02-08 PROCEDURE — 96375 TX/PRO/DX INJ NEW DRUG ADDON: CPT

## 2018-02-08 PROCEDURE — 63600175 PHARM REV CODE 636 W HCPCS: Performed by: INTERNAL MEDICINE

## 2018-02-08 PROCEDURE — S0028 INJECTION, FAMOTIDINE, 20 MG: HCPCS | Performed by: INTERNAL MEDICINE

## 2018-02-08 PROCEDURE — 25000003 PHARM REV CODE 250: Performed by: INTERNAL MEDICINE

## 2018-02-08 PROCEDURE — 96413 CHEMO IV INFUSION 1 HR: CPT

## 2018-02-08 RX ORDER — DEXAMETHASONE SODIUM PHOSPHATE 100 MG/10ML
10 INJECTION INTRAMUSCULAR; INTRAVENOUS
Status: COMPLETED | OUTPATIENT
Start: 2018-02-08 | End: 2018-02-08

## 2018-02-08 RX ORDER — HEPARIN 100 UNIT/ML
500 SYRINGE INTRAVENOUS
Status: DISCONTINUED | OUTPATIENT
Start: 2018-02-08 | End: 2018-02-08 | Stop reason: HOSPADM

## 2018-02-08 RX ORDER — DIPHENHYDRAMINE HYDROCHLORIDE 50 MG/ML
50 INJECTION INTRAMUSCULAR; INTRAVENOUS ONCE AS NEEDED
Status: DISCONTINUED | OUTPATIENT
Start: 2018-02-08 | End: 2018-02-08 | Stop reason: HOSPADM

## 2018-02-08 RX ORDER — FAMOTIDINE 10 MG/ML
20 INJECTION INTRAVENOUS
Status: COMPLETED | OUTPATIENT
Start: 2018-02-08 | End: 2018-02-08

## 2018-02-08 RX ORDER — DIPHENHYDRAMINE HYDROCHLORIDE 50 MG/ML
25 INJECTION INTRAMUSCULAR; INTRAVENOUS
Status: COMPLETED | OUTPATIENT
Start: 2018-02-08 | End: 2018-02-08

## 2018-02-08 RX ORDER — EPINEPHRINE 0.3 MG/.3ML
0.3 INJECTION SUBCUTANEOUS ONCE AS NEEDED
Status: DISCONTINUED | OUTPATIENT
Start: 2018-02-08 | End: 2018-02-08 | Stop reason: HOSPADM

## 2018-02-08 RX ORDER — SODIUM CHLORIDE 0.9 % (FLUSH) 0.9 %
10 SYRINGE (ML) INJECTION
Status: DISCONTINUED | OUTPATIENT
Start: 2018-02-08 | End: 2018-02-08 | Stop reason: HOSPADM

## 2018-02-08 RX ADMIN — FAMOTIDINE 20 MG: 10 INJECTION, SOLUTION INTRAVENOUS at 09:02

## 2018-02-08 RX ADMIN — PACLITAXEL 186 MG: 6 INJECTION, SOLUTION, CONCENTRATE INTRAVENOUS at 09:02

## 2018-02-08 RX ADMIN — DIPHENHYDRAMINE HYDROCHLORIDE 25 MG: 50 INJECTION, SOLUTION INTRAMUSCULAR; INTRAVENOUS at 09:02

## 2018-02-08 RX ADMIN — HEPARIN 500 UNITS: 100 SYRINGE at 11:02

## 2018-02-08 RX ADMIN — SODIUM CHLORIDE: 0.9 INJECTION, SOLUTION INTRAVENOUS at 09:02

## 2018-02-08 RX ADMIN — TRASTUZUMAB 257 MG: 150 INJECTION, POWDER, LYOPHILIZED, FOR SOLUTION INTRAVENOUS at 11:02

## 2018-02-08 RX ADMIN — DEXAMETHASONE SODIUM PHOSPHATE 10 MG: 10 INJECTION, SOLUTION INTRAMUSCULAR; INTRAVENOUS at 09:02

## 2018-02-08 NOTE — NURSING
Pt tolerated Taxol/Herceptin well. No adverse reaction noted. Pt education reinforced on chemo regimen, side effects, what to expect, and when to call _[facility]_. Pt verbalized understanding. I reviewed pt calendar w/ pt and understanding verbalized. PAC deaccessed and flushed w/ NS and heparin per protocol.

## 2018-02-14 ENCOUNTER — LAB VISIT (OUTPATIENT)
Dept: LAB | Facility: HOSPITAL | Age: 73
End: 2018-02-14
Attending: INTERNAL MEDICINE
Payer: MEDICARE

## 2018-02-14 DIAGNOSIS — Z17.0 CANCER OF LEFT BREAST, STAGE 1, ESTROGEN RECEPTOR POSITIVE: ICD-10-CM

## 2018-02-14 DIAGNOSIS — C50.912 CANCER OF LEFT BREAST, STAGE 1, ESTROGEN RECEPTOR POSITIVE: ICD-10-CM

## 2018-02-14 LAB
ALBUMIN SERPL BCP-MCNC: 3.6 G/DL
ALP SERPL-CCNC: 59 U/L
ALT SERPL W/O P-5'-P-CCNC: 27 U/L
ANION GAP SERPL CALC-SCNC: 12 MMOL/L
AST SERPL-CCNC: 22 U/L
BILIRUB SERPL-MCNC: 0.3 MG/DL
BUN SERPL-MCNC: 7 MG/DL
CALCIUM SERPL-MCNC: 9.1 MG/DL
CHLORIDE SERPL-SCNC: 97 MMOL/L
CO2 SERPL-SCNC: 32 MMOL/L
CREAT SERPL-MCNC: 0.8 MG/DL
ERYTHROCYTE [DISTWIDTH] IN BLOOD BY AUTOMATED COUNT: 18.9 %
EST. GFR  (AFRICAN AMERICAN): >60 ML/MIN/1.73 M^2
EST. GFR  (NON AFRICAN AMERICAN): >60 ML/MIN/1.73 M^2
GLUCOSE SERPL-MCNC: 160 MG/DL
HCT VFR BLD AUTO: 33.7 %
HGB BLD-MCNC: 10.8 G/DL
MCH RBC QN AUTO: 25 PG
MCHC RBC AUTO-ENTMCNC: 32 G/DL
MCV RBC AUTO: 78 FL
NEUTROPHILS # BLD AUTO: 3.2 K/UL
PLATELET # BLD AUTO: 359 K/UL
PMV BLD AUTO: 9.2 FL
POTASSIUM SERPL-SCNC: 3.1 MMOL/L
PROT SERPL-MCNC: 7.4 G/DL
RBC # BLD AUTO: 4.32 M/UL
SODIUM SERPL-SCNC: 141 MMOL/L
WBC # BLD AUTO: 9.13 K/UL

## 2018-02-14 PROCEDURE — 85027 COMPLETE CBC AUTOMATED: CPT

## 2018-02-14 PROCEDURE — 80053 COMPREHEN METABOLIC PANEL: CPT

## 2018-02-14 PROCEDURE — 36415 COLL VENOUS BLD VENIPUNCTURE: CPT

## 2018-02-15 ENCOUNTER — INFUSION (OUTPATIENT)
Dept: INFUSION THERAPY | Facility: HOSPITAL | Age: 73
End: 2018-02-15
Attending: INTERNAL MEDICINE
Payer: MEDICARE

## 2018-02-15 VITALS
SYSTOLIC BLOOD PRESSURE: 125 MMHG | HEIGHT: 61 IN | HEART RATE: 97 BPM | TEMPERATURE: 98 F | BODY MASS INDEX: 53.43 KG/M2 | RESPIRATION RATE: 20 BRPM | WEIGHT: 283 LBS | DIASTOLIC BLOOD PRESSURE: 77 MMHG

## 2018-02-15 DIAGNOSIS — C50.912 CANCER OF LEFT BREAST, STAGE 1, ESTROGEN RECEPTOR POSITIVE: ICD-10-CM

## 2018-02-15 DIAGNOSIS — Z17.0 CANCER OF LEFT BREAST, STAGE 1, ESTROGEN RECEPTOR POSITIVE: ICD-10-CM

## 2018-02-15 DIAGNOSIS — Z51.11 ENCOUNTER FOR ANTINEOPLASTIC CHEMOTHERAPY: Primary | ICD-10-CM

## 2018-02-15 PROCEDURE — 96413 CHEMO IV INFUSION 1 HR: CPT

## 2018-02-15 PROCEDURE — 63600175 PHARM REV CODE 636 W HCPCS: Performed by: INTERNAL MEDICINE

## 2018-02-15 PROCEDURE — 96417 CHEMO IV INFUS EACH ADDL SEQ: CPT

## 2018-02-15 PROCEDURE — 96375 TX/PRO/DX INJ NEW DRUG ADDON: CPT

## 2018-02-15 PROCEDURE — 25000003 PHARM REV CODE 250: Performed by: INTERNAL MEDICINE

## 2018-02-15 PROCEDURE — S0028 INJECTION, FAMOTIDINE, 20 MG: HCPCS | Performed by: INTERNAL MEDICINE

## 2018-02-15 RX ORDER — DIPHENHYDRAMINE HYDROCHLORIDE 50 MG/ML
50 INJECTION INTRAMUSCULAR; INTRAVENOUS ONCE AS NEEDED
Status: DISCONTINUED | OUTPATIENT
Start: 2018-02-15 | End: 2018-02-15 | Stop reason: HOSPADM

## 2018-02-15 RX ORDER — DIPHENHYDRAMINE HYDROCHLORIDE 50 MG/ML
25 INJECTION INTRAMUSCULAR; INTRAVENOUS ONCE
Status: COMPLETED | OUTPATIENT
Start: 2018-02-15 | End: 2018-02-15

## 2018-02-15 RX ORDER — SODIUM CHLORIDE 0.9 % (FLUSH) 0.9 %
10 SYRINGE (ML) INJECTION
Status: DISCONTINUED | OUTPATIENT
Start: 2018-02-15 | End: 2018-02-15 | Stop reason: HOSPADM

## 2018-02-15 RX ORDER — DEXAMETHASONE SODIUM PHOSPHATE 100 MG/10ML
10 INJECTION INTRAMUSCULAR; INTRAVENOUS ONCE
Status: COMPLETED | OUTPATIENT
Start: 2018-02-15 | End: 2018-02-15

## 2018-02-15 RX ORDER — HEPARIN 100 UNIT/ML
500 SYRINGE INTRAVENOUS
Status: DISCONTINUED | OUTPATIENT
Start: 2018-02-15 | End: 2018-02-15 | Stop reason: HOSPADM

## 2018-02-15 RX ORDER — EPINEPHRINE 0.3 MG/.3ML
0.3 INJECTION SUBCUTANEOUS ONCE AS NEEDED
Status: DISCONTINUED | OUTPATIENT
Start: 2018-02-15 | End: 2018-02-15 | Stop reason: HOSPADM

## 2018-02-15 RX ORDER — FAMOTIDINE 10 MG/ML
20 INJECTION INTRAVENOUS
Status: COMPLETED | OUTPATIENT
Start: 2018-02-15 | End: 2018-02-15

## 2018-02-15 RX ADMIN — FAMOTIDINE 20 MG: 10 INJECTION, SOLUTION INTRAVENOUS at 09:02

## 2018-02-15 RX ADMIN — PACLITAXEL 186 MG: 30 INJECTION, SOLUTION INTRAVENOUS at 10:02

## 2018-02-15 RX ADMIN — TRASTUZUMAB 257 MG: 150 INJECTION, POWDER, LYOPHILIZED, FOR SOLUTION INTRAVENOUS at 11:02

## 2018-02-15 RX ADMIN — HEPARIN 500 UNITS: 100 SYRINGE at 11:02

## 2018-02-15 RX ADMIN — DEXAMETHASONE SODIUM PHOSPHATE 10 MG: 10 INJECTION, SOLUTION INTRAMUSCULAR; INTRAVENOUS at 09:02

## 2018-02-15 RX ADMIN — SODIUM CHLORIDE: 0.9 INJECTION, SOLUTION INTRAVENOUS at 09:02

## 2018-02-15 RX ADMIN — DIPHENHYDRAMINE HYDROCHLORIDE 25 MG: 50 INJECTION, SOLUTION INTRAMUSCULAR; INTRAVENOUS at 09:02

## 2018-02-21 ENCOUNTER — LAB VISIT (OUTPATIENT)
Dept: LAB | Facility: HOSPITAL | Age: 73
End: 2018-02-21
Attending: INTERNAL MEDICINE
Payer: MEDICARE

## 2018-02-21 ENCOUNTER — OFFICE VISIT (OUTPATIENT)
Dept: HEMATOLOGY/ONCOLOGY | Facility: CLINIC | Age: 73
End: 2018-02-21
Payer: MEDICARE

## 2018-02-21 VITALS
BODY MASS INDEX: 52.32 KG/M2 | DIASTOLIC BLOOD PRESSURE: 79 MMHG | TEMPERATURE: 98 F | HEIGHT: 61 IN | OXYGEN SATURATION: 97 % | SYSTOLIC BLOOD PRESSURE: 114 MMHG | WEIGHT: 277.13 LBS | HEART RATE: 115 BPM

## 2018-02-21 DIAGNOSIS — E87.6 HYPOKALEMIA: ICD-10-CM

## 2018-02-21 DIAGNOSIS — Z51.11 ENCOUNTER FOR ANTINEOPLASTIC CHEMOTHERAPY: ICD-10-CM

## 2018-02-21 DIAGNOSIS — T45.1X5A NEUROPATHY DUE TO CHEMOTHERAPEUTIC DRUG: ICD-10-CM

## 2018-02-21 DIAGNOSIS — C50.912 CANCER OF LEFT BREAST, STAGE 1, ESTROGEN RECEPTOR POSITIVE: Primary | ICD-10-CM

## 2018-02-21 DIAGNOSIS — Z17.0 CANCER OF LEFT BREAST, STAGE 1, ESTROGEN RECEPTOR POSITIVE: Primary | ICD-10-CM

## 2018-02-21 DIAGNOSIS — C50.912 CANCER OF LEFT BREAST, STAGE 1, ESTROGEN RECEPTOR POSITIVE: ICD-10-CM

## 2018-02-21 DIAGNOSIS — G62.0 NEUROPATHY DUE TO CHEMOTHERAPEUTIC DRUG: ICD-10-CM

## 2018-02-21 DIAGNOSIS — Z17.0 CANCER OF LEFT BREAST, STAGE 1, ESTROGEN RECEPTOR POSITIVE: ICD-10-CM

## 2018-02-21 LAB
ALBUMIN SERPL BCP-MCNC: 3.5 G/DL
ALP SERPL-CCNC: 53 U/L
ALT SERPL W/O P-5'-P-CCNC: 33 U/L
ANION GAP SERPL CALC-SCNC: 8 MMOL/L
AST SERPL-CCNC: 21 U/L
BILIRUB SERPL-MCNC: 0.3 MG/DL
BUN SERPL-MCNC: 12 MG/DL
CALCIUM SERPL-MCNC: 9.5 MG/DL
CHLORIDE SERPL-SCNC: 98 MMOL/L
CO2 SERPL-SCNC: 34 MMOL/L
CREAT SERPL-MCNC: 0.8 MG/DL
ERYTHROCYTE [DISTWIDTH] IN BLOOD BY AUTOMATED COUNT: 19.4 %
EST. GFR  (AFRICAN AMERICAN): >60 ML/MIN/1.73 M^2
EST. GFR  (NON AFRICAN AMERICAN): >60 ML/MIN/1.73 M^2
GLUCOSE SERPL-MCNC: 126 MG/DL
HCT VFR BLD AUTO: 34.7 %
HGB BLD-MCNC: 10.9 G/DL
MCH RBC QN AUTO: 24.4 PG
MCHC RBC AUTO-ENTMCNC: 31.4 G/DL
MCV RBC AUTO: 78 FL
NEUTROPHILS # BLD AUTO: 2.7 K/UL
PLATELET # BLD AUTO: 418 K/UL
PMV BLD AUTO: 9.3 FL
POTASSIUM SERPL-SCNC: 3.2 MMOL/L
PROT SERPL-MCNC: 7 G/DL
RBC # BLD AUTO: 4.46 M/UL
SODIUM SERPL-SCNC: 140 MMOL/L
WBC # BLD AUTO: 7.67 K/UL

## 2018-02-21 PROCEDURE — 36415 COLL VENOUS BLD VENIPUNCTURE: CPT

## 2018-02-21 PROCEDURE — 80053 COMPREHEN METABOLIC PANEL: CPT

## 2018-02-21 PROCEDURE — 1159F MED LIST DOCD IN RCRD: CPT | Mod: S$GLB,,, | Performed by: INTERNAL MEDICINE

## 2018-02-21 PROCEDURE — 3008F BODY MASS INDEX DOCD: CPT | Mod: S$GLB,,, | Performed by: INTERNAL MEDICINE

## 2018-02-21 PROCEDURE — 1126F AMNT PAIN NOTED NONE PRSNT: CPT | Mod: S$GLB,,, | Performed by: INTERNAL MEDICINE

## 2018-02-21 PROCEDURE — 85027 COMPLETE CBC AUTOMATED: CPT

## 2018-02-21 PROCEDURE — 99999 PR PBB SHADOW E&M-EST. PATIENT-LVL III: CPT | Mod: PBBFAC,,, | Performed by: INTERNAL MEDICINE

## 2018-02-21 PROCEDURE — 99214 OFFICE O/P EST MOD 30 MIN: CPT | Mod: S$GLB,,, | Performed by: INTERNAL MEDICINE

## 2018-02-21 NOTE — PROGRESS NOTES
Subjective:       Patient ID: Karen Duke is a 72 y.o. female.    Chief Complaint: Breast Cancer    HPI PCP Dr. Sarina Macias.     She has newly diagnosed left breast cancer on routine screening mammogram done in 2017.      A 1.1 centimeter lobulated mass was noticed in the 8:00 position in the left breast.  Images were done at [facility].  Biopsy was done on 17.  It was high-grade invasive ductal carcinoma - ER +96%, TN +94% and HER-2/bharti IHC 3+.  Ki-67 was 37%.     Menarche occurred at age 14.  Menopause at age 51.  She still has her uterus and ovaries in situ.  She is  with age of first pregnancy at 21.  No history of hormone replacement therapy.  She took oral contraceptive pills for 20-30 years.  No history of biopsy.       The patient has a history of sickle cell trait with no manifestation.  No history of transfusion.     Underwent left breast lumpectomy on 10/12/17.     Lumpectomy specimen showed 2 foci of high grade (grade 3) invasive carcinoma - measuring 10 millimeters and 13 millimeters. Margins negative.  1 sentinel lymph node removed and it was negative.    Started Taxol/Herceptin 17.    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    Review of Systems   Constitutional: Negative for fever and unexpected weight change.   Respiratory: Negative for wheezing and stridor.    Gastrointestinal: Negative for abdominal pain and blood in stool.   Hematological: Negative for adenopathy. Does not bruise/bleed easily.         Objective:      Physical Exam   Constitutional: She is oriented to person, place, and time. No distress.   HENT:   Mouth/Throat: No oropharyngeal exudate.   Eyes: No scleral icterus.   Neck: Neck supple.   Cardiovascular: Normal rate.  Exam reveals no gallop.    Pulmonary/Chest: Effort normal. She has no wheezes. She has no rales.   Abdominal: Soft. There is no tenderness.   Musculoskeletal: She exhibits no edema.   Lymphadenopathy:     She has no cervical adenopathy.    Neurological: She is alert and oriented to person, place, and time.   Skin: She is not diaphoretic.         Assessment:     1. Cancer of left breast, stage 1, estrogen receptor positive    2. Hypokalemia    3. Encounter for antineoplastic chemotherapy    4. Neuropathy due to chemotherapeutic drug      Plan:   In summary this is a 72-year-old female with Stage I ER/ND/HER-2/bharti positive high-grade invasive ductal carcinoma of the left breast with Ki-67 at 37%.     The plan is to do 3 months of Taxol/Herceptin therapy followed by adjuvant radiation therapy followed by 9 months of single agent Herceptin therapy - so asked to complete a total of 1 year of Herceptin.    Started Taxol/Herceptin 11/30/17.    Proceed with 12th dose scheduled for tomorrow.     Hypokalemia persists - Continue potassium chloride 60 mEq QD.    See [facility] for adjuvant RT.    F/u with me in May to restart remainder of Herceptin to complete 1 year of Herceptin.

## 2018-02-22 ENCOUNTER — INFUSION (OUTPATIENT)
Dept: INFUSION THERAPY | Facility: HOSPITAL | Age: 73
End: 2018-02-22
Attending: INTERNAL MEDICINE
Payer: MEDICARE

## 2018-02-22 VITALS
TEMPERATURE: 98 F | OXYGEN SATURATION: 94 % | HEART RATE: 103 BPM | SYSTOLIC BLOOD PRESSURE: 130 MMHG | RESPIRATION RATE: 18 BRPM | DIASTOLIC BLOOD PRESSURE: 63 MMHG

## 2018-02-22 DIAGNOSIS — C50.912 CANCER OF LEFT BREAST, STAGE 1, ESTROGEN RECEPTOR POSITIVE: ICD-10-CM

## 2018-02-22 DIAGNOSIS — Z17.0 CANCER OF LEFT BREAST, STAGE 1, ESTROGEN RECEPTOR POSITIVE: ICD-10-CM

## 2018-02-22 DIAGNOSIS — Z51.11 ENCOUNTER FOR ANTINEOPLASTIC CHEMOTHERAPY: Primary | ICD-10-CM

## 2018-02-22 PROCEDURE — 25000003 PHARM REV CODE 250: Performed by: INTERNAL MEDICINE

## 2018-02-22 PROCEDURE — S0028 INJECTION, FAMOTIDINE, 20 MG: HCPCS | Performed by: INTERNAL MEDICINE

## 2018-02-22 PROCEDURE — 96413 CHEMO IV INFUSION 1 HR: CPT

## 2018-02-22 PROCEDURE — 96411 CHEMO IV PUSH ADDL DRUG: CPT

## 2018-02-22 PROCEDURE — 96375 TX/PRO/DX INJ NEW DRUG ADDON: CPT

## 2018-02-22 PROCEDURE — 63600175 PHARM REV CODE 636 W HCPCS: Performed by: INTERNAL MEDICINE

## 2018-02-22 RX ORDER — SODIUM CHLORIDE 0.9 % (FLUSH) 0.9 %
10 SYRINGE (ML) INJECTION
Status: DISCONTINUED | OUTPATIENT
Start: 2018-02-22 | End: 2018-02-22 | Stop reason: HOSPADM

## 2018-02-22 RX ORDER — FAMOTIDINE 10 MG/ML
20 INJECTION INTRAVENOUS
Status: COMPLETED | OUTPATIENT
Start: 2018-02-22 | End: 2018-02-22

## 2018-02-22 RX ORDER — EPINEPHRINE 0.3 MG/.3ML
0.3 INJECTION SUBCUTANEOUS ONCE AS NEEDED
Status: DISCONTINUED | OUTPATIENT
Start: 2018-02-22 | End: 2018-02-22 | Stop reason: HOSPADM

## 2018-02-22 RX ORDER — DEXAMETHASONE SODIUM PHOSPHATE 10 MG/ML
10 INJECTION INTRAMUSCULAR; INTRAVENOUS
Status: COMPLETED | OUTPATIENT
Start: 2018-02-22 | End: 2018-02-22

## 2018-02-22 RX ORDER — DIPHENHYDRAMINE HYDROCHLORIDE 50 MG/ML
50 INJECTION INTRAMUSCULAR; INTRAVENOUS ONCE AS NEEDED
Status: DISCONTINUED | OUTPATIENT
Start: 2018-02-22 | End: 2018-02-22 | Stop reason: HOSPADM

## 2018-02-22 RX ORDER — HEPARIN 100 UNIT/ML
500 SYRINGE INTRAVENOUS
Status: DISCONTINUED | OUTPATIENT
Start: 2018-02-22 | End: 2018-02-22 | Stop reason: HOSPADM

## 2018-02-22 RX ORDER — DIPHENHYDRAMINE HYDROCHLORIDE 50 MG/ML
25 INJECTION INTRAMUSCULAR; INTRAVENOUS
Status: COMPLETED | OUTPATIENT
Start: 2018-02-22 | End: 2018-02-22

## 2018-02-22 RX ADMIN — HEPARIN 500 UNITS: 100 SYRINGE at 11:02

## 2018-02-22 RX ADMIN — DEXAMETHASONE SODIUM PHOSPHATE 10 MG: 10 INJECTION, SOLUTION INTRAMUSCULAR; INTRAVENOUS at 09:02

## 2018-02-22 RX ADMIN — TRASTUZUMAB 251 MG: 150 INJECTION, POWDER, LYOPHILIZED, FOR SOLUTION INTRAVENOUS at 11:02

## 2018-02-22 RX ADMIN — DIPHENHYDRAMINE HYDROCHLORIDE 25 MG: 50 INJECTION, SOLUTION INTRAMUSCULAR; INTRAVENOUS at 09:02

## 2018-02-22 RX ADMIN — PACLITAXEL 186 MG: 30 INJECTION, SOLUTION INTRAVENOUS at 10:02

## 2018-02-22 RX ADMIN — FAMOTIDINE 20 MG: 10 INJECTION INTRAVENOUS at 09:02

## 2018-02-22 RX ADMIN — SODIUM CHLORIDE: 0.9 INJECTION, SOLUTION INTRAVENOUS at 09:02

## 2018-03-07 ENCOUNTER — INITIAL CONSULT (OUTPATIENT)
Dept: RADIATION ONCOLOGY | Facility: CLINIC | Age: 73
End: 2018-03-07
Attending: INTERNAL MEDICINE
Payer: MEDICARE

## 2018-03-07 VITALS
WEIGHT: 285.81 LBS | TEMPERATURE: 98 F | DIASTOLIC BLOOD PRESSURE: 82 MMHG | SYSTOLIC BLOOD PRESSURE: 167 MMHG | BODY MASS INDEX: 54 KG/M2 | HEART RATE: 123 BPM | RESPIRATION RATE: 20 BRPM

## 2018-03-07 DIAGNOSIS — Z17.0 CANCER OF LEFT BREAST, STAGE 1, ESTROGEN RECEPTOR POSITIVE: Primary | ICD-10-CM

## 2018-03-07 DIAGNOSIS — C50.912 CANCER OF LEFT BREAST, STAGE 1, ESTROGEN RECEPTOR POSITIVE: Primary | ICD-10-CM

## 2018-03-07 PROCEDURE — 99999 PR PBB SHADOW E&M-EST. PATIENT-LVL III: CPT | Mod: PBBFAC,,, | Performed by: RADIOLOGY

## 2018-03-07 PROCEDURE — 99204 OFFICE O/P NEW MOD 45 MIN: CPT | Mod: S$GLB,,, | Performed by: RADIOLOGY

## 2018-03-07 PROCEDURE — 3077F SYST BP >= 140 MM HG: CPT | Mod: S$GLB,,, | Performed by: RADIOLOGY

## 2018-03-07 PROCEDURE — 3079F DIAST BP 80-89 MM HG: CPT | Mod: S$GLB,,, | Performed by: RADIOLOGY

## 2018-03-07 RX ORDER — GLIPIZIDE 5 MG/1
TABLET, FILM COATED, EXTENDED RELEASE ORAL
COMMUNITY
Start: 2018-01-26 | End: 2018-05-28

## 2018-03-07 NOTE — PATIENT INSTRUCTIONS
Radiation Therapy Treatment  Radiation therapy can help you in your fight against cancer. It begins with a session to discuss treatment with your doctor. If you and your doctor decide on radiation, you will return for a simulation. The simulation is a planning session that helps the doctor target your cancer. He or she will design a radiation plan to protect normal tissues. When the simulation and plan are completed, you will begin your daily treatments. Treatment is usually once daily Monday to Friday. It takes less than a half an hour. Sometimes you may need radiation twice a day, with usually 6 hours between treatments. After the course of radiation is complete, you will be scheduled for follow-up appointments. This is to make sure the cancer is under control. The follow-ups will also make sure that any side effects from the treatment are taken care of.  Radiation therapy uses high-energy X-rays to kill cancer cells.   Your treatment planning visit: The simulation  Your radiation therapy team uses a special machine called a simulator to map out your treatment. The simulator is usually an X-ray machine (fluoroscopy), CT scanner, MRI scanner, or PET-CT scanner machine. Laser lights act as guides to help position your body accurately. During this visit:  · The team figures out the best position for your body. They make notes in your chart so youll be placed the same way each time.  · You may use special devices to keep your body correctly positioned and still during treatment. These may include molds, masks, rests, and blocks.  · The team makes ink marks on your skin. These will help you get in the same position for each treatment. Tiny permanent tattoos may also be used.   · Markers such as metal balls or wires may be put on or in your body. Sometime these are taped to the skin to help with the imaging process. These work with the X-rays to position your body. The markers are removed when the visit is  over.  After the team has the imaging and data, the information is sent into the computer planning system. Your doctor and the team of physicists and dosimetrists design a treatment field. The field will best target your cancer and how it might spread. It will also help limit radiation to nearby normal tissues.  Your treatments  Each treatment usually takes 10 to 30 minutes. You may need to change into a hospital gown. The radiation therapist puts you in the correct position on the treatment table, then leaves the room. Sometimes you may need more imaging before each treatment. The machine may take digital X-rays or a CT scan to help make sure you are lined up correctly. During treatment, lie as still as you can and breathe normally. You will hear noises coming from the machine. You can talk to the radiation therapist, who watches you from the control room on a TV monitor. After treatment, the therapist will help you off the table. You can then get dressed and go back to your normal activities.  Date Last Reviewed: 1/14/2016 © 2000-2017 [employer]. [address] [place] [address]. All rights reserved. This information is not intended as a substitute for professional medical care. Always follow your healthcare professional's instructions.        Discharge Instructions for Radiation Therapy  Radiation therapy uses high-energy X-rays to kill cancer cells and help you in your fight against cancer. Radiation destroys cancer cells gradually, over time. The goal of therapy is to focus on and kill as many cancer cells as possible. Radiation can also damage or kill some of the normal cells that are closest to the tumor. Damaged normal cells can repair themselves, often within a few days.  Caring for your skin  You should ask your healthcare provider for specific products that he or she recommends for washing and bathing. In general, use a mild nondetergent soap and warm (not hot) water to clean the  "area receiving radiation. Pat the region dry rather than rubbing.  Your healthcare provider may give you products to moisturize the skin and prevent infection. The goal is to prevent cracks or breaks in the skin that may be sensitive from treatment:   · Dont be surprised if your treatment causes skin redness, and a type of "sunburn" over time. Some radiation treatments can cause this.   · Ask your therapy team what lotion to use. Also ask for directions about when and how to apply it.  · Avoid prolonged or direct sunlight on the treated area. Ask your therapy team about using a sunscreen. You do not have to avoid going outside altogether, but must take appropriate precautions.  · Dont remove ink marks unless your radiation therapist says its OK. Dont scrub or use soap on the marks when you wash. Let water run over them and pat them dry.  · Protect your skin from heat or cold. Avoid hot tubs, saunas, heating pads, or ice packs.  · Avoid clothing that causes friction or rubbing on the skin.  Fighting fatigue  Radiation therapy may cause you to feel tired. Your body is working hard to heal and repair itself. To feel better, try these things:  · Do light exercise each day. Take short walks.  · Plan tasks for the times when you tend to have the most energy. Ask for help when you need it.  · Relax before you go to bed. This will help you sleep better. Try reading or listening to soothing music.  Coping with appetite changes  Here are ways to cope:  · Tell your therapy team if you find it hard to eat or you have no appetite. You may be referred to a nutritionist to help you with meal planning.  · Radiation to certain internal sites can cause nausea, depending on the location of treatment. This can affect your appetite. Think of healthy eating as part of your treatment. Try these tips:  ¨ Eat slowly.  ¨ Eat small meals several times a day.  ¨ Eat more food when youre feeling better.  ¨ Ask others to keep you company " when you eat.  ¨ Stock up on easy-to-prepare foods.  ¨ Eat foods high in protein and calories. Your healthcare provider may recommend liquid meal supplements.  ¨ Drink plenty of water and other fluids.  ¨ Ask your healthcare provider before taking any vitamins or over-the-counter supplements. Such products are not regulated by the FDA and can sometimes interfere with your treatments.   Dealing with other side effects  Here are suggestions to deal with other side effects:   · Be prepared for hair loss in the area being treated. The hair loss may be permanent. Be sure to discuss this with your healthcare provider.  · Sip cool water if your mouth or throat becomes dry or sore. Ice chips may also help.  · Tell your healthcare provider if you have diarrhea or constipation. You may be given a special diet.  · If you have trouble swallowing liquids, tell your healthcare provider.  Follow-up  Make a follow-up appointment as directed by your healthcare provider.     When to call your healthcare provider  Call your healthcare provider right away if you have any of the following:  · Unexpected headaches  · Trouble concentrating  · Ongoing fatigue  · Wheezing, shortness of breath, or trouble breathing  · Pain that doesnt go away, especially if its always in the same place  · New or unusual lumps, bumps, or swelling  · Dizziness or lightheadedness  · Unusual rashes, bruises, or bleeding  · Fever of 100.4°F (38°C) or higher, or chills  · Nausea and vomiting  · Diarrhea that doesnt improve with time  · Skin breakdown; significant pain due to skin irritation   Date Last Reviewed: 1/13/2016  © 4742-9148 [employer]. [address] [place] [address], [place] [address]. All rights reserved. This information is not intended as a substitute for professional medical care. Always follow your healthcare professional's instructions.        Radiation Therapy: Managing Short-Term Side Effects     Take short walks daily.   Radiation  therapy uses high-energy X-rays or particles to kill cancer cells. Some normal cells can also be affected. This causes side effects such as dry skin, tiredness (fatigue), or changes in your appetite. Most side effects go away when your radiation therapy is over.  Having side effects of radiation therapy does not mean that your cancer is getting worse or that therapy isnt working.   Caring for your skin  Skin problems may happen where your body gets radiation. Your skin may become dry, itchy, red, and peeling. It may darken in that spot, like a tan. To care for your skin:  · Dont scrub on the treatment area. Clean that area of the skin every day. Use warm water and mild soap, or as your healthcare provider advises. Pat the skin afterward or let it air dry.  · Ask your therapy team what lotion to use and when to use it.  · Keep the treated area out of the sun. Ask your team about using a sunscreen.  · Don't remove ink marks unless your radiation therapist says you can. Dont scrub the marks when you wash. Let water run over them and pat them dry.  · Protect your skin from heat or cold. Avoid hot tubs, saunas, hot pads, and ice packs.  · Wear soft, loose clothing to avoid rubbing skin.  Fighting tiredness  The cancer itself or the radiation therapy may cause you to feel tired. Your body is working hard to heal and repair itself. To feel better:  · Try light exercise each day. Take short walks.  · Plan tasks for the times when you tend to have the most energy. Ask for help when you need it.  · Relax before you go to bed to sleep better. Try reading or listening to soothing music.  · Be sure to let your cancer care team know if you continue to have fatigue that is not getting better. They may be able to offer ways to help.   Coping with appetite changes  Tell your therapy team if you find it hard to eat or have no appetite. You may need to see a nutritionist. This is a healthcare provider with special training in meal  planning. To keep your strength up, you need to eat well and maintain your weight. Think of healthy eating as part of your treatment. Try these tips:  · Eat slowly.  · Eat small meals several times a day.  · Eat more food when youre feeling better, even if it is not mealtime.  · Ask others to keep you company when you eat.  · Stock up on easy-to-prepare foods.  · Eat foods high in protein and calories.  · Drink plenty of water and other fluids.  · Ask your healthcare provider before taking any vitamins.  Site-specific side effects  These side effects include the following:   · You may lose hair in the area being treated. The hair often grows back after treatment.  · Your mouth or throat can become dry or sore if your head or neck is being treated. Sip cool water to help ease discomfort.  · Nausea and bowel changes can happen with radiation to the pelvic region. Tell your healthcare provider if you have nausea, diarrhea, or constipation. You may need to take medicine or follow a special diet.  Talk with your healthcare team  Radiation therapy can also have other side effects, including some that might not show up until years later. Be sure to talk with your healthcare team about what to expect with the type of radiation therapy you are getting, including when you should call them with concerns.   Date Last Reviewed: 5/1/2016  © 3098-5445 [employer]. [address] [place] [address], [place] [address]. All rights reserved. This information is not intended as a substitute for professional medical care. Always follow your healthcare professional's instructions.      Return for CT/sim in one week.

## 2018-03-07 NOTE — PROGRESS NOTES
REFERRING PHYSICIAN:   Fadia Wallace M.D., Nic Wilkins M.D.    DIAGNOSIS:    p T1 CN 0 M0, stage IA, infiltrating ductal carcinoma of the left breast    HISTORY OF PRESENT ILLNESS:   Ms. Duke is a 72-year-old female who was recently diagnosed with left breast cancer after evaluation for an abnormal mammogram revealed a lobulated lesion in the area o'clock position of the left breast.  A core needle biopsy on 2017 revealed invasive ductal carcinoma, grade 3.  This lesion is ER/IN positive and HER-2 positive.  On 2017, she underwent lumpectomy and sentinel node biopsy.  The pathology revealed the left breast with invasive ductal carcinoma, grade 3, with associated DCIS.  There were 2 foci noted, one measuring 1.3 cm and the other measuring 10 mm.  The closest margin is 0.2 cm anteriorly.  One out of one sentinel lymph node is negative for involvement.    She completed chemotherapy with Taxol and Herceptin with the last cycle on 2018.  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  She denies left breast pain, edema, erythema, or nipple discharge.  She also denies fever, night sweats, or weight loss.    REVIEW OF SYSTEMS:  As above.  In addition, patient denies headaches, visual problems, dizziness, chest pain, shortness of breath, cough, nausea, vomiting, diarrhea, or any new bony pains. Patient also denies easy bruising, skin rashes, or numbness or tingling.    GYN HISTORY:   Menarche at age 14.  Menopause at age 51.  .  She denies hormonal placement therapy.  She has history of oral contraceptive pills for 20-30 years.    ECO-1 due to fatigue from chemotherapy    PAST MEDICAL HISTORY:  Past Medical History:   Diagnosis Date    Diabetes mellitus     Hyperlipidemia     Hypertension        PAST SURGICAL HISTORY:  Past Surgical History:   Procedure Laterality Date    EYE SURGERY Right     cataract    Hammer toe Left        ALLERGIES:    Review of patient's allergies indicates:  No Known Allergies    MEDICATIONS:  Current Outpatient Prescriptions   Medication Sig    aspirin (ECOTRIN) 81 MG EC tablet Take 81 mg by mouth once daily.    glipiZIDE (GLUCOTROL) 5 MG tablet Take 5 mg by mouth 2 (two) times daily before meals.    metformin (GLUCOPHAGE-XR) 500 MG 24 hr tablet Take 500 mg by mouth daily with breakfast.    metolazone (ZAROXOLYN) 5 MG tablet 5 mg once daily.     pravastatin (PRAVACHOL) 40 MG tablet 40 mg 2 (two) times daily.     valsartan-hydrochlorothiazide (DIOVAN-HCT) 160-25 mg per tablet     dexamethasone (DECADRON) 4 MG Tab Take 2 pills the night before chemo    glipiZIDE (GLUCOTROL) 5 MG TR24     hydrocodone-acetaminophen 5-325mg (NORCO) 5-325 mg per tablet Take 1-2 tablets PO q4-6hours PRN pain    ibuprofen (ADVIL,MOTRIN) 100 mg/5 mL suspension Take by mouth every 6 (six) hours as needed for Temperature greater than.    loratadine (CLARITIN) 5 mg chewable tablet Take 5 mg by mouth once daily.    ondansetron (ZOFRAN) 4 MG tablet Take 1 tablet (4 mg total) by mouth every 6 (six) hours as needed for Nausea.    peg 3350-electrolytes-vit C (MOVIPREP) 100-7.5-2.691 gram PwPk Take as directed    potassium chloride SA (K-DUR,KLOR-CON) 20 MEQ tablet Take 3 tabs once daily     No current facility-administered medications for this visit.        SOCIAL HISTORY:  Social History     Social History    Marital status: Single     Spouse name: N/A    Number of children: N/A    Years of education: N/A     Occupational History    Not on file.     Social History Main Topics    Smoking status: Former Smoker     Packs/day: 0.90     Years: 5.00     Types: Cigarettes    Smokeless tobacco: Former User    Alcohol use 0.6 oz/week     1 Shots of liquor per week    Drug use: No    Sexual activity: Not on file     Other Topics Concern    Not on file     Social History Narrative    No narrative on file       FAMILY HISTORY:  No family history  on file.      PHYSICAL EXAMINATION:  Vitals:    03/07/18 1317   BP: (!) 167/82   Pulse: (!) 123   Resp: 20   Temp: 98.3 °F (36.8 °C)     GENERAL: Patient is alert and oriented, in no acute distress.  HEENT:Extraocular muscles are intact.  Oropharynx is clear without lesions.  There is no cervical or supraclavicular lymphadenopathy palpated.  No thyromegaly noted.  HEART: Regular rate and rhythm.  LUNGS: Clear to auscultation bilaterally.  BREAST EXAM:  The left breast is smaller than the right.  The scar secondary to lumpectomy is noted in the upper inner aspect of the periareolar region.  There is mild fibrosis associated with the scar.  There is also a scar in the left axilla secondary to sentinel node biopsy.  No abnormal masses palpated in the left breast or left axilla.  The right breast and right axilla are also without palpable masses.  ABDOMEN:Soft, nontender, nondistended, without hepatosplenomegaly.  Normoactive bowel sounds.  EXTREMITIES: No clubbing, cyanosis, or edema.  NEUROLOGICAL: Cranial nerve II through XII grossly intact.  Sensation is intact.  Strength is 5 out of 5 in the upper and lower extremities bilaterally.       ASSESSMENT:   This is a 72-year-old female with p T1 CN 0 M0, grade 3, multifocal, invasive ductal carcinoma of the left breast to underwent lumpectomy and sentinel node biopsy on January 12, 2017 followed by adjuvant chemotherapy for a lesion which is ER/OK positive and HER-2 positive.    PLAN:   After review of the pathology and images of the radiological studies, Ms. Duke is noted to have grade 3 left breast cancer which is triple positive.  She has completed adjuvant chemotherapy.  She is recommended to undergo postoperative radiation to the left breast to reduce her chance of local recurrence.  I plan to deliver approximately 4500 cGy +1600 cGy boost.  After that, she will restart Herceptin as planned.    The risks, benefits, and side effects of radiation were explained in  detail to the patient and her family.  All questions were answered and informed consent was signed.  I plan to see the patient back for radiation planning CT in the next one week.    Psychosocial Distress screening score of 0 noted and reviewed. No intervention indicated.    I spent approximately 60 minutes reviewing the available records and evaluating the patient, out of which over 50% of the time was spent face to face with the patient in counseling and coordinating this patient's care.

## 2018-03-15 ENCOUNTER — HOSPITAL ENCOUNTER (OUTPATIENT)
Dept: RADIATION THERAPY | Facility: HOSPITAL | Age: 73
Discharge: HOME | End: 2018-03-15
Attending: RADIOLOGY
Payer: MEDICARE

## 2018-03-15 PROCEDURE — 77333 RADIATION TREATMENT AID(S): CPT | Mod: 26,,, | Performed by: RADIOLOGY

## 2018-03-15 PROCEDURE — 77333 RADIATION TREATMENT AID(S): CPT | Mod: TC | Performed by: RADIOLOGY

## 2018-03-15 PROCEDURE — 77014 HC CT GUIDANCE RADIATION THERAPY FLDS PLACEMENT: CPT | Mod: TC | Performed by: RADIOLOGY

## 2018-03-15 PROCEDURE — 77290 THER RAD SIMULAJ FIELD CPLX: CPT | Mod: TC | Performed by: RADIOLOGY

## 2018-03-15 PROCEDURE — 77263 THER RADIOLOGY TX PLNG CPLX: CPT | Mod: ,,, | Performed by: RADIOLOGY

## 2018-03-15 PROCEDURE — 77290 THER RAD SIMULAJ FIELD CPLX: CPT | Mod: 26,,, | Performed by: RADIOLOGY

## 2018-03-20 PROCEDURE — 77300 RADIATION THERAPY DOSE PLAN: CPT | Mod: TC | Performed by: RADIOLOGY

## 2018-03-20 PROCEDURE — 77300 RADIATION THERAPY DOSE PLAN: CPT | Mod: 26,,, | Performed by: RADIOLOGY

## 2018-03-20 PROCEDURE — 77295 3-D RADIOTHERAPY PLAN: CPT | Mod: TC | Performed by: RADIOLOGY

## 2018-03-20 PROCEDURE — 77334 RADIATION TREATMENT AID(S): CPT | Mod: TC | Performed by: RADIOLOGY

## 2018-03-20 PROCEDURE — 77295 3-D RADIOTHERAPY PLAN: CPT | Mod: 26,,, | Performed by: RADIOLOGY

## 2018-03-20 PROCEDURE — 77334 RADIATION TREATMENT AID(S): CPT | Mod: 26,,, | Performed by: RADIOLOGY

## 2018-03-21 ENCOUNTER — DOCUMENTATION ONLY (OUTPATIENT)
Dept: RADIATION ONCOLOGY | Facility: CLINIC | Age: 73
End: 2018-03-21

## 2018-03-21 PROCEDURE — 77280 THER RAD SIMULAJ FIELD SMPL: CPT | Mod: 26,,, | Performed by: RADIOLOGY

## 2018-03-21 PROCEDURE — 77280 THER RAD SIMULAJ FIELD SMPL: CPT | Mod: TC | Performed by: RADIOLOGY

## 2018-03-22 PROCEDURE — 77412 RADIATION TX DELIVERY LVL 3: CPT | Performed by: RADIOLOGY

## 2018-03-23 PROCEDURE — 77412 RADIATION TX DELIVERY LVL 3: CPT | Performed by: RADIOLOGY

## 2018-03-26 ENCOUNTER — DOCUMENTATION ONLY (OUTPATIENT)
Dept: RADIATION ONCOLOGY | Facility: CLINIC | Age: 73
End: 2018-03-26

## 2018-03-26 PROCEDURE — 77412 RADIATION TX DELIVERY LVL 3: CPT | Performed by: RADIOLOGY

## 2018-03-26 NOTE — PLAN OF CARE
Problem: Patient Care Overview  Goal: Plan of Care Review  Outcome: Outcome(s) achieved Date Met: 03/26/18  Pt. On day 3 of xrt to breast.  Pt.using cream.  Just started.

## 2018-03-27 PROCEDURE — 77336 RADIATION PHYSICS CONSULT: CPT | Performed by: RADIOLOGY

## 2018-03-27 PROCEDURE — 77412 RADIATION TX DELIVERY LVL 3: CPT | Performed by: RADIOLOGY

## 2018-03-28 PROCEDURE — 77412 RADIATION TX DELIVERY LVL 3: CPT | Performed by: RADIOLOGY

## 2018-03-29 PROCEDURE — 77412 RADIATION TX DELIVERY LVL 3: CPT | Performed by: RADIOLOGY

## 2018-03-29 PROCEDURE — 77417 THER RADIOLOGY PORT IMAGE(S): CPT | Performed by: RADIOLOGY

## 2018-04-02 ENCOUNTER — HOSPITAL ENCOUNTER (OUTPATIENT)
Dept: RADIATION THERAPY | Facility: HOSPITAL | Age: 73
Discharge: HOME | End: 2018-04-02
Attending: RADIOLOGY
Payer: MEDICARE

## 2018-04-02 PROCEDURE — 77412 RADIATION TX DELIVERY LVL 3: CPT | Performed by: RADIOLOGY

## 2018-04-03 PROCEDURE — 77412 RADIATION TX DELIVERY LVL 3: CPT | Performed by: RADIOLOGY

## 2018-04-05 PROCEDURE — 77412 RADIATION TX DELIVERY LVL 3: CPT | Performed by: RADIOLOGY

## 2018-04-06 ENCOUNTER — DOCUMENTATION ONLY (OUTPATIENT)
Dept: RADIATION ONCOLOGY | Facility: CLINIC | Age: 73
End: 2018-04-06

## 2018-04-06 PROCEDURE — 77412 RADIATION TX DELIVERY LVL 3: CPT | Performed by: RADIOLOGY

## 2018-04-06 NOTE — PLAN OF CARE
Problem: Patient Care Overview  Goal: Plan of Care Review  Outcome: Ongoing (interventions implemented as appropriate)  Pt. On day 10 of xrt to breast.  NAD noted.  Using cream.

## 2018-04-09 ENCOUNTER — TELEPHONE (OUTPATIENT)
Dept: HEMATOLOGY/ONCOLOGY | Facility: CLINIC | Age: 73
End: 2018-04-09

## 2018-04-09 DIAGNOSIS — C50.912 CANCER OF LEFT BREAST, STAGE 1, ESTROGEN RECEPTOR POSITIVE: Primary | ICD-10-CM

## 2018-04-09 DIAGNOSIS — Z17.0 CANCER OF LEFT BREAST, STAGE 1, ESTROGEN RECEPTOR POSITIVE: Primary | ICD-10-CM

## 2018-04-09 PROCEDURE — 77417 THER RADIOLOGY PORT IMAGE(S): CPT | Performed by: RADIOLOGY

## 2018-04-09 PROCEDURE — 77412 RADIATION TX DELIVERY LVL 3: CPT | Performed by: RADIOLOGY

## 2018-04-10 ENCOUNTER — DOCUMENTATION ONLY (OUTPATIENT)
Dept: RADIATION ONCOLOGY | Facility: CLINIC | Age: 73
End: 2018-04-10

## 2018-04-10 PROCEDURE — 77412 RADIATION TX DELIVERY LVL 3: CPT | Performed by: RADIOLOGY

## 2018-04-10 PROCEDURE — 77336 RADIATION PHYSICS CONSULT: CPT | Performed by: RADIOLOGY

## 2018-04-10 NOTE — PLAN OF CARE
Problem: Patient Care Overview  Goal: Plan of Care Review  Outcome: Ongoing (interventions implemented as appropriate)  Pt. On day 11 of xrt to breast.  Pt. C/o slight aching.  Mild hyperpigmentation. Using cream.

## 2018-04-11 PROCEDURE — 77412 RADIATION TX DELIVERY LVL 3: CPT | Performed by: RADIOLOGY

## 2018-04-12 PROCEDURE — 77412 RADIATION TX DELIVERY LVL 3: CPT | Performed by: RADIOLOGY

## 2018-04-13 PROCEDURE — 77412 RADIATION TX DELIVERY LVL 3: CPT | Performed by: RADIOLOGY

## 2018-04-17 PROCEDURE — 77417 THER RADIOLOGY PORT IMAGE(S): CPT | Performed by: RADIOLOGY

## 2018-04-17 PROCEDURE — 77412 RADIATION TX DELIVERY LVL 3: CPT | Performed by: RADIOLOGY

## 2018-04-18 PROCEDURE — 77412 RADIATION TX DELIVERY LVL 3: CPT | Performed by: RADIOLOGY

## 2018-04-19 ENCOUNTER — DOCUMENTATION ONLY (OUTPATIENT)
Dept: RADIATION ONCOLOGY | Facility: CLINIC | Age: 73
End: 2018-04-19

## 2018-04-19 PROCEDURE — 77412 RADIATION TX DELIVERY LVL 3: CPT | Performed by: RADIOLOGY

## 2018-04-19 NOTE — PLAN OF CARE
Problem: Patient Care Overview  Goal: Plan of Care Review  Outcome: Ongoing (interventions implemented as appropriate)    Pt. On day 17 of xrt to breast.  Pt. With hyperpigmentation to breast.  Pt. Doing well.  No c/o.

## 2018-04-20 PROCEDURE — 77336 RADIATION PHYSICS CONSULT: CPT | Performed by: RADIOLOGY

## 2018-04-20 PROCEDURE — 77412 RADIATION TX DELIVERY LVL 3: CPT | Performed by: RADIOLOGY

## 2018-04-23 PROCEDURE — 77412 RADIATION TX DELIVERY LVL 3: CPT | Performed by: RADIOLOGY

## 2018-04-24 ENCOUNTER — DOCUMENTATION ONLY (OUTPATIENT)
Dept: RADIATION ONCOLOGY | Facility: CLINIC | Age: 73
End: 2018-04-24

## 2018-04-24 PROCEDURE — 77417 THER RADIOLOGY PORT IMAGE(S): CPT | Performed by: RADIOLOGY

## 2018-04-24 PROCEDURE — 77412 RADIATION TX DELIVERY LVL 3: CPT | Performed by: RADIOLOGY

## 2018-04-24 NOTE — PLAN OF CARE
Problem: Patient Care Overview  Goal: Plan of Care Review  Outcome: Ongoing (interventions implemented as appropriate)  Pt. On day 20 of xrt to breast.  Faint erythema.  Hyperpigmentation noted.  Pt. C/o nipple sensitivity.

## 2018-04-25 PROCEDURE — 77412 RADIATION TX DELIVERY LVL 3: CPT | Performed by: RADIOLOGY

## 2018-04-26 PROCEDURE — 77334 RADIATION TREATMENT AID(S): CPT | Mod: 26,,, | Performed by: RADIOLOGY

## 2018-04-26 PROCEDURE — 77412 RADIATION TX DELIVERY LVL 3: CPT | Performed by: RADIOLOGY

## 2018-04-26 PROCEDURE — 77321 SPECIAL TELETX PORT PLAN: CPT | Mod: TC | Performed by: RADIOLOGY

## 2018-04-26 PROCEDURE — 77334 RADIATION TREATMENT AID(S): CPT | Mod: TC | Performed by: RADIOLOGY

## 2018-04-26 PROCEDURE — 77321 SPECIAL TELETX PORT PLAN: CPT | Mod: 26,,, | Performed by: RADIOLOGY

## 2018-04-27 PROCEDURE — 77412 RADIATION TX DELIVERY LVL 3: CPT | Performed by: RADIOLOGY

## 2018-04-29 PROCEDURE — 77336 RADIATION PHYSICS CONSULT: CPT | Performed by: RADIOLOGY

## 2018-04-30 PROCEDURE — 77412 RADIATION TX DELIVERY LVL 3: CPT | Performed by: RADIOLOGY

## 2018-05-01 ENCOUNTER — HOSPITAL ENCOUNTER (OUTPATIENT)
Dept: RADIATION THERAPY | Facility: HOSPITAL | Age: 73
Discharge: HOME | End: 2018-05-01
Attending: RADIOLOGY
Payer: MEDICARE

## 2018-05-01 ENCOUNTER — DOCUMENTATION ONLY (OUTPATIENT)
Dept: RADIATION ONCOLOGY | Facility: CLINIC | Age: 73
End: 2018-05-01

## 2018-05-01 PROCEDURE — 77412 RADIATION TX DELIVERY LVL 3: CPT | Performed by: RADIOLOGY

## 2018-05-01 NOTE — PLAN OF CARE
Problem: Patient Care Overview  Goal: Plan of Care Review  Outcome: Ongoing (interventions implemented as appropriate)  Pt. On day 25 of xrt to breast.  Pt. With hyperpigmentation.  Skin intact.  Using cream.

## 2018-05-01 NOTE — PLAN OF CARE
Problem: Patient Care Overview  Goal: Plan of Care Review  Outcome: Ongoing (interventions implemented as appropriate)  Pt. On day 25 of xrt to breast.  Faint erythema.  Mild hyperpigmentation.  Denies pain.  No desquamation.

## 2018-05-02 ENCOUNTER — TELEPHONE (OUTPATIENT)
Dept: SURGERY | Facility: CLINIC | Age: 73
End: 2018-05-02

## 2018-05-02 PROCEDURE — 77412 RADIATION TX DELIVERY LVL 3: CPT | Performed by: RADIOLOGY

## 2018-05-02 NOTE — TELEPHONE ENCOUNTER
"----- Message from Zane Mancuso sent at 5/2/2018  3:58 PM CDT -----  Contact: [phone]/self  Patient requesting to speak with the nurse (personal).  Please advise     05/02/18                             1705  Patient states when she got out of shower this morning, she found a marble size bulge in her groin. Patient states "it doesn't hurt or anything." When asked if bulge was reducible, patient states she hadn't tried to push it back in. When patient attempted to push bulge back in, patient states she couldn't find it, but could feel it underneath the skin. Patient asked if she should go to her primary care doctor or see  Dr. Wallace. Patient instructed to contact her Primary care doctor. Patient verbalized understanding.     "

## 2018-05-03 PROCEDURE — 77412 RADIATION TX DELIVERY LVL 3: CPT | Performed by: RADIOLOGY

## 2018-05-04 PROCEDURE — 77412 RADIATION TX DELIVERY LVL 3: CPT | Performed by: RADIOLOGY

## 2018-05-07 ENCOUNTER — DOCUMENTATION ONLY (OUTPATIENT)
Dept: RADIATION ONCOLOGY | Facility: CLINIC | Age: 73
End: 2018-05-07

## 2018-05-07 PROCEDURE — 77412 RADIATION TX DELIVERY LVL 3: CPT | Performed by: RADIOLOGY

## 2018-05-07 PROCEDURE — 77336 RADIATION PHYSICS CONSULT: CPT | Performed by: RADIOLOGY

## 2018-05-08 PROCEDURE — 77412 RADIATION TX DELIVERY LVL 3: CPT | Performed by: RADIOLOGY

## 2018-05-08 NOTE — PLAN OF CARE
Problem: Patient Care Overview  Goal: Plan of Care Review  Outcome: Ongoing (interventions implemented as appropriate)  Day 30 of radiation to the left breast dry desquamation to the inframammary area

## 2018-05-09 PROCEDURE — 77412 RADIATION TX DELIVERY LVL 3: CPT | Performed by: RADIOLOGY

## 2018-05-10 ENCOUNTER — DOCUMENTATION ONLY (OUTPATIENT)
Dept: RADIATION ONCOLOGY | Facility: CLINIC | Age: 73
End: 2018-05-10

## 2018-05-10 PROCEDURE — 77412 RADIATION TX DELIVERY LVL 3: CPT | Performed by: RADIOLOGY

## 2018-05-11 PROCEDURE — 77336 RADIATION PHYSICS CONSULT: CPT | Performed by: RADIOLOGY

## 2018-05-11 NOTE — PLAN OF CARE
Problem: Patient Care Overview  Goal: Plan of Care Review  Outcome: Outcome(s) achieved Date Met: 05/11/18  Radiation t the left breast completed on 5/10/18  F/u appt made

## 2018-05-28 ENCOUNTER — LAB VISIT (OUTPATIENT)
Dept: LAB | Facility: HOSPITAL | Age: 73
End: 2018-05-28
Attending: INTERNAL MEDICINE
Payer: MEDICARE

## 2018-05-28 ENCOUNTER — OFFICE VISIT (OUTPATIENT)
Dept: SURGERY | Facility: CLINIC | Age: 73
End: 2018-05-28
Payer: MEDICARE

## 2018-05-28 ENCOUNTER — OFFICE VISIT (OUTPATIENT)
Dept: HEMATOLOGY/ONCOLOGY | Facility: CLINIC | Age: 73
End: 2018-05-28
Payer: MEDICARE

## 2018-05-28 VITALS
DIASTOLIC BLOOD PRESSURE: 71 MMHG | SYSTOLIC BLOOD PRESSURE: 118 MMHG | TEMPERATURE: 98 F | WEIGHT: 272.69 LBS | OXYGEN SATURATION: 93 % | HEART RATE: 88 BPM | BODY MASS INDEX: 51.53 KG/M2

## 2018-05-28 VITALS
HEIGHT: 61 IN | DIASTOLIC BLOOD PRESSURE: 73 MMHG | SYSTOLIC BLOOD PRESSURE: 120 MMHG | TEMPERATURE: 98 F | BODY MASS INDEX: 51.7 KG/M2 | WEIGHT: 273.81 LBS | HEART RATE: 93 BPM

## 2018-05-28 DIAGNOSIS — Z17.0 CANCER OF LEFT BREAST, STAGE 1, ESTROGEN RECEPTOR POSITIVE: ICD-10-CM

## 2018-05-28 DIAGNOSIS — Z51.11 ENCOUNTER FOR ANTINEOPLASTIC CHEMOTHERAPY: ICD-10-CM

## 2018-05-28 DIAGNOSIS — E87.6 HYPOKALEMIA: ICD-10-CM

## 2018-05-28 DIAGNOSIS — Z17.0 CANCER OF LEFT BREAST, STAGE 1, ESTROGEN RECEPTOR POSITIVE: Primary | ICD-10-CM

## 2018-05-28 DIAGNOSIS — C50.912 CANCER OF LEFT BREAST, STAGE 1, ESTROGEN RECEPTOR POSITIVE: Primary | ICD-10-CM

## 2018-05-28 DIAGNOSIS — T45.1X5A NEUROPATHY DUE TO CHEMOTHERAPEUTIC DRUG: ICD-10-CM

## 2018-05-28 DIAGNOSIS — G62.0 NEUROPATHY DUE TO CHEMOTHERAPEUTIC DRUG: ICD-10-CM

## 2018-05-28 DIAGNOSIS — C50.912 CANCER OF LEFT BREAST, STAGE 1, ESTROGEN RECEPTOR POSITIVE: ICD-10-CM

## 2018-05-28 LAB
ALBUMIN SERPL BCP-MCNC: 3.6 G/DL
ALP SERPL-CCNC: 55 U/L
ALT SERPL W/O P-5'-P-CCNC: 13 U/L
ANION GAP SERPL CALC-SCNC: 10 MMOL/L
AST SERPL-CCNC: 18 U/L
BILIRUB SERPL-MCNC: 0.3 MG/DL
BUN SERPL-MCNC: 9 MG/DL
CALCIUM SERPL-MCNC: 9.7 MG/DL
CHLORIDE SERPL-SCNC: 98 MMOL/L
CO2 SERPL-SCNC: 33 MMOL/L
CREAT SERPL-MCNC: 0.8 MG/DL
ERYTHROCYTE [DISTWIDTH] IN BLOOD BY AUTOMATED COUNT: 15.7 %
EST. GFR  (AFRICAN AMERICAN): >60 ML/MIN/1.73 M^2
EST. GFR  (NON AFRICAN AMERICAN): >60 ML/MIN/1.73 M^2
GLUCOSE SERPL-MCNC: 120 MG/DL
HCT VFR BLD AUTO: 38.4 %
HGB BLD-MCNC: 12 G/DL
MCH RBC QN AUTO: 23.7 PG
MCHC RBC AUTO-ENTMCNC: 31.3 G/DL
MCV RBC AUTO: 76 FL
NEUTROPHILS # BLD AUTO: 3.8 K/UL
PLATELET # BLD AUTO: 279 K/UL
PMV BLD AUTO: 9.6 FL
POTASSIUM SERPL-SCNC: 3.3 MMOL/L
PROT SERPL-MCNC: 8 G/DL
RBC # BLD AUTO: 5.07 M/UL
SODIUM SERPL-SCNC: 141 MMOL/L
WBC # BLD AUTO: 7.1 K/UL

## 2018-05-28 PROCEDURE — 85027 COMPLETE CBC AUTOMATED: CPT

## 2018-05-28 PROCEDURE — 99999 PR PBB SHADOW E&M-EST. PATIENT-LVL III: CPT | Mod: PBBFAC,,, | Performed by: INTERNAL MEDICINE

## 2018-05-28 PROCEDURE — 3074F SYST BP LT 130 MM HG: CPT | Mod: CPTII,S$GLB,, | Performed by: INTERNAL MEDICINE

## 2018-05-28 PROCEDURE — 36415 COLL VENOUS BLD VENIPUNCTURE: CPT

## 2018-05-28 PROCEDURE — 80053 COMPREHEN METABOLIC PANEL: CPT

## 2018-05-28 PROCEDURE — 99215 OFFICE O/P EST HI 40 MIN: CPT | Mod: S$GLB,,, | Performed by: INTERNAL MEDICINE

## 2018-05-28 PROCEDURE — 99999 PR PBB SHADOW E&M-EST. PATIENT-LVL III: CPT | Mod: PBBFAC,,, | Performed by: SURGERY

## 2018-05-28 PROCEDURE — 3074F SYST BP LT 130 MM HG: CPT | Mod: CPTII,S$GLB,, | Performed by: SURGERY

## 2018-05-28 PROCEDURE — 99214 OFFICE O/P EST MOD 30 MIN: CPT | Mod: S$GLB,,, | Performed by: SURGERY

## 2018-05-28 PROCEDURE — 3078F DIAST BP <80 MM HG: CPT | Mod: CPTII,S$GLB,, | Performed by: INTERNAL MEDICINE

## 2018-05-28 PROCEDURE — 3078F DIAST BP <80 MM HG: CPT | Mod: CPTII,S$GLB,, | Performed by: SURGERY

## 2018-05-28 RX ORDER — HYDROCODONE BITARTRATE AND ACETAMINOPHEN 5; 325 MG/1; MG/1
1 TABLET ORAL EVERY 8 HOURS PRN
Qty: 20 TABLET | Refills: 0 | Status: SHIPPED | OUTPATIENT
Start: 2018-05-28 | End: 2018-07-05

## 2018-05-28 RX ORDER — LATANOPROST 50 UG/ML
SOLUTION/ DROPS OPHTHALMIC
COMMUNITY
Start: 2018-05-26

## 2018-05-28 RX ORDER — SODIUM CHLORIDE 0.9 % (FLUSH) 0.9 %
10 SYRINGE (ML) INJECTION
Status: CANCELLED | OUTPATIENT
Start: 2018-05-28

## 2018-05-28 RX ORDER — SODIUM CHLORIDE 0.9 % (FLUSH) 0.9 %
10 SYRINGE (ML) INJECTION
Status: CANCELLED | OUTPATIENT
Start: 2018-06-18

## 2018-05-28 RX ORDER — TRAMADOL HYDROCHLORIDE 50 MG/1
TABLET ORAL
Refills: 0 | COMMUNITY
Start: 2018-05-25

## 2018-05-28 RX ORDER — HEPARIN 100 UNIT/ML
500 SYRINGE INTRAVENOUS
Status: CANCELLED | OUTPATIENT
Start: 2018-06-18

## 2018-05-28 RX ORDER — POTASSIUM CHLORIDE 20 MEQ/1
20 TABLET, EXTENDED RELEASE ORAL DAILY
Qty: 90 TABLET | Refills: 3 | Status: SHIPPED | OUTPATIENT
Start: 2018-05-28 | End: 2018-07-05 | Stop reason: SDUPTHER

## 2018-05-28 RX ORDER — AMOXICILLIN 875 MG/1
TABLET, FILM COATED ORAL
Refills: 0 | COMMUNITY
Start: 2018-05-25 | End: 2018-07-05

## 2018-05-28 RX ORDER — HEPARIN 100 UNIT/ML
500 SYRINGE INTRAVENOUS
Status: CANCELLED | OUTPATIENT
Start: 2018-05-28

## 2018-05-28 NOTE — PROGRESS NOTES
History & Physical    SUBJECTIVE:     History of Present Illness:  No breast pain, mass, or skin change.   She has completed adjuvant radiation, experienced some skin blistering. She was given local wound care, with good healing.        Treatment Summary   Course: Course1 Treatment Site  Ref. ID  Energy  Dose/Fx (Gy)  #Fx  Dose Correction (Gy)  Total Dose (Gy)  Start Date  End Date  Elapsed Days    LT BREAST  rx  18X  1.8   /   0  45  3/22/2018  2018  39    BOOST  SCLAV  16E  2    0  16  2018  5/10/2018  9          First visit 2017  Pt referred by Dr. Macias for biopsy proven left breast cancer.  She gets her mammograms at [facility] every year since the age of 41.  She has no prior symptoms of breast pain, breast mass, nipple discharge, or skin change.    Menarche occurred at age 14.  Menopause at age 51.  She still has her uterus and ovaries in situ.  She is  with age of first pregnancy at 21.  No history of hormone replacement therapy.  She took oral contraceptive pills for 20-30 years.  No history of biopsy.  She has a maternal aunt with breast cancer and this aunt's daughter which is her first cousin has breast cancer also.    The patient has a history of sickle cell trait with no manifestation.  No history of transfusion.    Her images are not available for interpretation by me today.  However the reports show a 1.1 cm mass in the left breast at 8:00 which is hypoechoic with irregular borders, the axilla was scanned and negative.  This correlates to mammographic abnormality on the left.  A biopsy was performed which shows invasive ductal carcinoma high grade ER + greater than 95%, IA +93.6%, HER-2 +3+ by immunohistochemistry, Ki-67 greater than 20%, HER-2/bharti fish positive ratio greater than 11.4.      10/17/18 Lumpectomy specimen showed 2 foci of high grade (grade 3) invasive carcinoma - measuring 10 millimeters and 13 millimeters. Margins negative.  1 sentinel lymph node removed and it was  negative.     Started Taxol/Herceptin 11/30/17.        Cc left breast cancer    Review of patient's allergies indicates:  No Known Allergies    Current Outpatient Prescriptions   Medication Sig Dispense Refill    amoxicillin (AMOXIL) 875 MG tablet TK 1 T PO BID FOR 7 DAYS  0    aspirin (ECOTRIN) 81 MG EC tablet Take 81 mg by mouth once daily.      dexamethasone (DECADRON) 4 MG Tab Take 2 pills the night before chemo 30 tablet 0    ibuprofen (ADVIL,MOTRIN) 100 mg/5 mL suspension Take by mouth every 6 (six) hours as needed for Temperature greater than.      loratadine (CLARITIN) 5 mg chewable tablet Take 5 mg by mouth once daily.      metformin (GLUCOPHAGE-XR) 500 MG 24 hr tablet Take 500 mg by mouth daily with breakfast.      metolazone (ZAROXOLYN) 5 MG tablet 5 mg once daily.       potassium chloride SA (K-DUR,KLOR-CON) 20 MEQ tablet Take 3 tabs once daily 90 tablet 0    pravastatin (PRAVACHOL) 40 MG tablet 40 mg 2 (two) times daily.       traMADol (ULTRAM) 50 mg tablet   0    valsartan-hydrochlorothiazide (DIOVAN-HCT) 160-25 mg per tablet       glipiZIDE (GLUCOTROL) 5 MG tablet Take 5 mg by mouth 2 (two) times daily before meals.      glipiZIDE (GLUCOTROL) 5 MG TR24       hydrocodone-acetaminophen 5-325mg (NORCO) 5-325 mg per tablet Take 1-2 tablets PO q4-6hours PRN pain 50 tablet 0    ondansetron (ZOFRAN) 4 MG tablet Take 1 tablet (4 mg total) by mouth every 6 (six) hours as needed for Nausea. 20 tablet 0    peg 3350-electrolytes-vit C (MOVIPREP) 100-7.5-2.691 gram PwPk Take as directed 1 packet 0     No current facility-administered medications for this visit.        Past Medical History:   Diagnosis Date    Diabetes mellitus     Hyperlipidemia     Hypertension      Past Surgical History:   Procedure Laterality Date    EYE SURGERY Right     cataract    Hammer toe Left      Family History   Problem Relation Age of Onset    Breast cancer Maternal Aunt     Lung cancer Maternal Grandmother   "    Social History   Substance Use Topics    Smoking status: Former Smoker     Packs/day: 0.90     Years: 5.00     Types: Cigarettes    Smokeless tobacco: Former User    Alcohol use 0.6 oz/week     1 Shots of liquor per week          Review of Systems   Constitutional: Negative for activity change, appetite change, chills and fever.   HENT: Negative for congestion and sore throat.    Eyes: Negative for photophobia and visual disturbance.   Respiratory: Negative for cough, shortness of breath and wheezing.    Cardiovascular: Negative for chest pain and palpitations.   Gastrointestinal: Negative for abdominal distention and abdominal pain.   Genitourinary: Negative for difficulty urinating, dysuria and frequency.   Musculoskeletal: Negative for gait problem and joint swelling.   Skin: Negative for rash and wound.   Neurological: Negative for dizziness and headaches.   Hematological: Negative for adenopathy. Does not bruise/bleed easily.   Psychiatric/Behavioral: Negative for dysphoric mood and sleep disturbance.       OBJECTIVE:     Vital Signs (Most Recent)  Temp: 98 °F (36.7 °C) (05/28/18 1322)  Pulse: 93 (05/28/18 1322)  BP: 120/73 (05/28/18 1322)  5' 1" (1.549 m)  124.2 kg (273 lb 13 oz)     Physical Exam   Constitutional: She is oriented to person, place, and time. She appears well-developed and well-nourished. No distress.   HENT:   Head: Normocephalic and atraumatic.   Eyes: Conjunctivae and EOM are normal. No scleral icterus.   Neck: Normal range of motion.   Cardiovascular: Normal rate and intact distal pulses.    Pulmonary/Chest: Effort normal. No stridor. No respiratory distress.   Abdominal: Soft. She exhibits no distension. There is no tenderness.   Musculoskeletal: Normal range of motion. She exhibits no edema or tenderness.   Neurological: She is alert and oriented to person, place, and time.   Skin: No rash noted. No pallor.   Psychiatric: She has a normal mood and affect. Her behavior is normal. "       Laboratory  n/a    Diagnostic Results:  n/a    ASSESSMENT/PLAN:     Assessment:     Stage I ER/AK/HER-2/bharti positive high-grade invasive ductal carcinoma of the left breast with Ki-67 at 37%.     Plan:     Will need mammogram 6-8 weeks after radiation, will arrange in [place] per her request  Continue herceptin based therapy  Then endocrine therapy  Can f/u with Dr. Wilkins for CBE

## 2018-05-28 NOTE — PROGRESS NOTES
Subjective:       Patient ID: Karen Duke is a 72 y.o. female.    Chief Complaint: No chief complaint on file.    HPI PCP Dr. Sarina Macias.     She was diagnosed with left breast cancer on routine screening mammogram done in September 2017. A 1.1 centimeter lobulated mass was noticed in the 8:00 position in the left breast.  Images were done at [facility].  Biopsy was done on 9/22/17.  It was high-grade invasive ductal carcinoma - ER +96%, VT +94% and HER-2/bharti IHC 3+.  Ki-67 was 37%. She has h/o sickle cell trait with no manifestation.  No history of blood transfusion.     Underwent left breast lumpectomy on 10/12/17. Lumpectomy specimen showed 2 foci of high grade (grade 3) invasive carcinoma - measuring 10 millimeters and 13 millimeters. Margins negative.  1 sentinel lymph node removed and it was negative.    Received 12 doses of weekly Taxol/Herceptin from Nov 2017 until Feb 2018. 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 Then completed adjuvant RT to left breast on 5/10/18.    Review of Systems   Constitutional: Negative for fever and unexpected weight change.   Respiratory: Negative for wheezing and stridor.    Gastrointestinal: Negative for abdominal pain and blood in stool.   Hematological: Negative for adenopathy. Does not bruise/bleed easily.         Objective:      Physical Exam   Constitutional: She is oriented to person, place, and time. No distress.   HENT:   Mouth/Throat: No oropharyngeal exudate.   Eyes: No scleral icterus.   Neck: Neck supple.   Cardiovascular: Normal rate.  Exam reveals no gallop.    Pulmonary/Chest: Effort normal. She has no wheezes. She has no rales.   Abdominal: Soft. There is no tenderness.   Musculoskeletal: She exhibits no edema.   Lymphadenopathy:     She has no cervical adenopathy.   Neurological: She is alert and oriented to person, place, and time.   Skin: She is not diaphoretic.         Assessment:     1. Cancer of left breast, stage 1, estrogen receptor positive    2. Encounter  for antineoplastic chemotherapy    3. Neuropathy due to chemotherapeutic drug    4. Hypokalemia      Plan:   In summary this is a 72-year-old female with Stage I ER/MN/HER-2/bharti positive high-grade invasive ductal carcinoma of the left breast with Ki-67 at 37%.    Got Taxol/Herceptin from Nov 2017 until Feb 2018, then completed RT.    Start postoperative Herceptin.  Plan 14 doses, once every 3 weeks.  She is going on a vacation next week and wants to start after that.  That is okay.    Check 2D echo prior to starting Herceptin.    Continue potassium chloride 30 mEq daily for hypokalemia.    Will plan to start her on aromatase inhibitor therapy during follow-up visit.

## 2018-05-31 ENCOUNTER — TELEPHONE (OUTPATIENT)
Dept: HEMATOLOGY/ONCOLOGY | Facility: CLINIC | Age: 73
End: 2018-05-31

## 2018-05-31 DIAGNOSIS — Z17.0 CANCER OF LEFT BREAST, STAGE 1, ESTROGEN RECEPTOR POSITIVE: Primary | ICD-10-CM

## 2018-05-31 DIAGNOSIS — C50.912 CANCER OF LEFT BREAST, STAGE 1, ESTROGEN RECEPTOR POSITIVE: Primary | ICD-10-CM

## 2018-06-12 ENCOUNTER — TELEPHONE (OUTPATIENT)
Dept: HEMATOLOGY/ONCOLOGY | Facility: CLINIC | Age: 73
End: 2018-06-12

## 2018-06-12 NOTE — TELEPHONE ENCOUNTER
----- Message from Madelyn Das sent at 6/12/2018 12:44 PM CDT -----  Contact: Self. [phone]  Patient would like to speak with you about if she should continue to take her pills before starting her chemo treatment. Please advise

## 2018-06-13 ENCOUNTER — TELEPHONE (OUTPATIENT)
Dept: HEMATOLOGY/ONCOLOGY | Facility: CLINIC | Age: 73
End: 2018-06-13

## 2018-06-13 ENCOUNTER — HOSPITAL ENCOUNTER (OUTPATIENT)
Dept: CARDIOLOGY | Facility: HOSPITAL | Age: 73
Discharge: HOME | End: 2018-06-13
Attending: FAMILY MEDICINE
Payer: MEDICARE

## 2018-06-13 DIAGNOSIS — C50.912 CANCER OF LEFT BREAST, STAGE 1, ESTROGEN RECEPTOR POSITIVE: ICD-10-CM

## 2018-06-13 DIAGNOSIS — Z17.0 CANCER OF LEFT BREAST, STAGE 1, ESTROGEN RECEPTOR POSITIVE: ICD-10-CM

## 2018-06-13 LAB
DIASTOLIC DYSFUNCTION: YES
ESTIMATED PA SYSTOLIC PRESSURE: 29.01
MITRAL VALVE MOBILITY: NORMAL
RETIRED EF AND QEF - SEE NOTES: 55 (ref 55–65)

## 2018-06-13 PROCEDURE — 93306 TTE W/DOPPLER COMPLETE: CPT | Mod: 26,,, | Performed by: INTERNAL MEDICINE

## 2018-06-13 PROCEDURE — 93306 TTE W/DOPPLER COMPLETE: CPT

## 2018-06-13 NOTE — TELEPHONE ENCOUNTER
----- Message from Nic Wilkins MD sent at 6/13/2018  3:32 PM CDT -----  Her heart echo came out good. Pls inform pt.

## 2018-06-15 ENCOUNTER — INFUSION (OUTPATIENT)
Dept: INFUSION THERAPY | Facility: HOSPITAL | Age: 73
End: 2018-06-15
Attending: INTERNAL MEDICINE
Payer: MEDICARE

## 2018-06-15 VITALS
HEART RATE: 99 BPM | RESPIRATION RATE: 18 BRPM | BODY MASS INDEX: 51.35 KG/M2 | DIASTOLIC BLOOD PRESSURE: 71 MMHG | HEIGHT: 61 IN | TEMPERATURE: 98 F | SYSTOLIC BLOOD PRESSURE: 108 MMHG | WEIGHT: 272 LBS

## 2018-06-15 DIAGNOSIS — Z51.11 ENCOUNTER FOR ANTINEOPLASTIC CHEMOTHERAPY: Primary | ICD-10-CM

## 2018-06-15 DIAGNOSIS — Z17.0 CANCER OF LEFT BREAST, STAGE 1, ESTROGEN RECEPTOR POSITIVE: ICD-10-CM

## 2018-06-15 DIAGNOSIS — C50.912 CANCER OF LEFT BREAST, STAGE 1, ESTROGEN RECEPTOR POSITIVE: ICD-10-CM

## 2018-06-15 PROCEDURE — 63600175 PHARM REV CODE 636 W HCPCS: Mod: JW,JG | Performed by: INTERNAL MEDICINE

## 2018-06-15 PROCEDURE — 25000003 PHARM REV CODE 250: Performed by: INTERNAL MEDICINE

## 2018-06-15 PROCEDURE — 96415 CHEMO IV INFUSION ADDL HR: CPT

## 2018-06-15 PROCEDURE — 96413 CHEMO IV INFUSION 1 HR: CPT

## 2018-06-15 RX ORDER — SODIUM CHLORIDE 0.9 % (FLUSH) 0.9 %
10 SYRINGE (ML) INJECTION
Status: DISCONTINUED | OUTPATIENT
Start: 2018-06-15 | End: 2018-06-15 | Stop reason: HOSPADM

## 2018-06-15 RX ORDER — HEPARIN 100 UNIT/ML
500 SYRINGE INTRAVENOUS
Status: DISCONTINUED | OUTPATIENT
Start: 2018-06-15 | End: 2018-06-15 | Stop reason: HOSPADM

## 2018-06-15 RX ADMIN — SODIUM CHLORIDE: 9 INJECTION, SOLUTION INTRAVENOUS at 12:06

## 2018-06-15 RX ADMIN — HEPARIN SODIUM (PORCINE) LOCK FLUSH IV SOLN 100 UNIT/ML 500 UNITS: 100 SOLUTION at 02:06

## 2018-06-15 RX ADMIN — TRASTUZUMAB 987 MG: 150 INJECTION, POWDER, LYOPHILIZED, FOR SOLUTION INTRAVENOUS at 01:06

## 2018-06-15 NOTE — NURSING
Pt tolerated Herceptin well. No adverse reaction noted. Pt education reinforced on chemo regimen, side effects, what to expect, and when to call . Pt verbalized understanding. I reviewed pt calendar w/ pt and understanding verbalized. Right PAC deaccessed and flushed w/ NS and heparin per protocol.

## 2018-06-28 ENCOUNTER — OFFICE VISIT (OUTPATIENT)
Dept: RADIATION ONCOLOGY | Facility: CLINIC | Age: 73
End: 2018-06-28
Payer: MEDICARE

## 2018-06-28 VITALS
RESPIRATION RATE: 20 BRPM | HEIGHT: 61 IN | BODY MASS INDEX: 51.19 KG/M2 | DIASTOLIC BLOOD PRESSURE: 79 MMHG | HEART RATE: 101 BPM | TEMPERATURE: 98 F | WEIGHT: 271.13 LBS | SYSTOLIC BLOOD PRESSURE: 142 MMHG

## 2018-06-28 DIAGNOSIS — Z17.0 CANCER OF LEFT BREAST, STAGE 1, ESTROGEN RECEPTOR POSITIVE: Primary | ICD-10-CM

## 2018-06-28 DIAGNOSIS — C50.912 CANCER OF LEFT BREAST, STAGE 1, ESTROGEN RECEPTOR POSITIVE: Primary | ICD-10-CM

## 2018-06-28 PROCEDURE — 3078F DIAST BP <80 MM HG: CPT | Mod: CPTII,S$GLB,, | Performed by: RADIOLOGY

## 2018-06-28 PROCEDURE — 3077F SYST BP >= 140 MM HG: CPT | Mod: CPTII,S$GLB,, | Performed by: RADIOLOGY

## 2018-06-28 PROCEDURE — 99213 OFFICE O/P EST LOW 20 MIN: CPT | Mod: S$GLB,,, | Performed by: RADIOLOGY

## 2018-06-28 PROCEDURE — 99999 PR PBB SHADOW E&M-EST. PATIENT-LVL III: CPT | Mod: PBBFAC,,, | Performed by: RADIOLOGY

## 2018-06-28 NOTE — PROGRESS NOTES
"REFERRING PHYSICIAN: Garrick Wilkins MD    DIAGNOSIS: p T1 CN 0 M0, stage IA, infiltrating ductal carcinoma of the left breast    Radiation Treatment Summary:  Ms. duke completed radiation to the left breast as shown below.    Treatment Site Energy Dose/Fx (Gy) #Fx Total Dose (Gy) Start Date End Date   LT BREAST 18X 1.8 25 / 25 45 3/22/2018 4/30/2018   BOOST 16E 2 8 / 8 16 5/1/2018 5/10/2018     INTERVAL HISTORY:   Ms. Duke is a 73-year-old female who was recently diagnosed with left breast cancer after evaluation for an abnormal mammogram revealed a lobulated lesion in the area o'clock position of the left breast. A core needle biopsy on September 18, 2017 revealed invasive ductal carcinoma, grade 3. This lesion is ER/AR positive and HER-2 positive. On October 12, 2017, she underwent lumpectomy and sentinel node biopsy. The pathology revealed the left breast with invasive ductal carcinoma, grade 3, with associated DCIS. There were 2 foci noted, one measuring 1.3 cm and the other measuring 10 mm. The closest margin is 0.2 cm anteriorly. One out of one sentinel lymph node is negative for involvement. She completed chemotherapy with Taxol and Herceptin with the last cycle on February 21, 2018. To reduce her chance of local recurrence, she received postoperative radiation to the left breast as above.  She is here today for a routine follow-up.    Since completion of radiation, she has started Herceptin back.  She reports intermittent discomfort in the left breast.  She denies left breast edema, erythema, or nipple discharge.  She also denies fever, night sweats, or weight loss.    PHYSICAL EXAMINATION:  VITAL SINGS: BP (!) 142/79 (BP Location: Right arm, Patient Position: Sitting)   Pulse 101   Temp 97.9 °F (36.6 °C) (Oral)   Resp 20   Ht 5' 1" (1.549 m)   Wt 123 kg (271 lb 1.6 oz)   BMI 51.22 kg/m²   GENERAL: Patient is alert and oriented, in no acute distress.  HEENT: Extraocular muscles are intact.  " Oropharynx is clear without lesions.  There is no cervical or supraclavicular adenopathy palpated.    CHEST: Breath sounds clear bilaterally.  No rales.  No rhonchi.  Unlabored respirations.  CARDIOVASCULAR: Normal S1, S2.  Normal rate.  Regular rhythm.  BREAST EXAM: There is mild hyperpigmentation of the skin of the left breast.  The left breast is smaller than the right. The scar secondary to lumpectomy is noted in the upper inner aspect of the periareolar region. There is mild fibrosis associated with the scar. There is also a scar in the left axilla secondary to sentinel node biopsy. No abnormal masses palpated in the left breast or left axilla. The right breast and right axilla are also without palpable masses.  ABDOMEN: Bowel sounds normal.  No tenderness.  No abdominal distention.  No hepatomegaly.  No splenomegaly.  EXTREMITIES: No clubbing, cyanosis, edema  NEUROLOGIC: Cranial nerves II through XII are grossly intact.  Sensation is intact.  Strength is 5 out of 5 in the upper and lower extremities bilaterally.    ASSESSMENT:   This is a 73-year-old female with p T1 CN 0 M0, grade 3, multifocal, invasive ductal carcinoma of the left breast who underwent lumpectomy and sentinel node biopsy on January 12, 2017 followed by adjuvant chemotherapy and postoperative radiation for a lesion which is ER/AR positive and HER-2 positive.    PLAN:   Ms. Duke is recovering from the radiation without any unexpected side effects.  She will continue Herceptin as planned.  She will need a repeat mammogram in October 2018.  She would like to get that done at [facility].  She is planned to follow-up with Dr. Wilkins next week.  I plan to see her back in approximately 6 months, unless symptoms warrant otherwise.

## 2018-06-28 NOTE — PATIENT INSTRUCTIONS
Continue herceptin as planned.   Repeat mammogram in October 2018.  Return to see me in 6 months.

## 2018-07-05 ENCOUNTER — LAB VISIT (OUTPATIENT)
Dept: LAB | Facility: HOSPITAL | Age: 73
End: 2018-07-05
Attending: INTERNAL MEDICINE
Payer: MEDICARE

## 2018-07-05 ENCOUNTER — OFFICE VISIT (OUTPATIENT)
Dept: HEMATOLOGY/ONCOLOGY | Facility: CLINIC | Age: 73
End: 2018-07-05
Payer: MEDICARE

## 2018-07-05 VITALS
BODY MASS INDEX: 50.78 KG/M2 | WEIGHT: 268.94 LBS | OXYGEN SATURATION: 99 % | HEIGHT: 61 IN | HEART RATE: 104 BPM | TEMPERATURE: 98 F | DIASTOLIC BLOOD PRESSURE: 80 MMHG | SYSTOLIC BLOOD PRESSURE: 119 MMHG

## 2018-07-05 DIAGNOSIS — C50.912 CANCER OF LEFT BREAST, STAGE 1, ESTROGEN RECEPTOR POSITIVE: Primary | ICD-10-CM

## 2018-07-05 DIAGNOSIS — Z51.11 ENCOUNTER FOR ANTINEOPLASTIC CHEMOTHERAPY: ICD-10-CM

## 2018-07-05 DIAGNOSIS — C50.912 CANCER OF LEFT BREAST, STAGE 1, ESTROGEN RECEPTOR POSITIVE: ICD-10-CM

## 2018-07-05 DIAGNOSIS — E87.6 HYPOKALEMIA: ICD-10-CM

## 2018-07-05 DIAGNOSIS — Z17.0 CANCER OF LEFT BREAST, STAGE 1, ESTROGEN RECEPTOR POSITIVE: ICD-10-CM

## 2018-07-05 DIAGNOSIS — Z17.0 CANCER OF LEFT BREAST, STAGE 1, ESTROGEN RECEPTOR POSITIVE: Primary | ICD-10-CM

## 2018-07-05 LAB
ALBUMIN SERPL BCP-MCNC: 3.4 G/DL
ALP SERPL-CCNC: 54 U/L
ALT SERPL W/O P-5'-P-CCNC: 21 U/L
ANION GAP SERPL CALC-SCNC: 8 MMOL/L
AST SERPL-CCNC: 18 U/L
BILIRUB SERPL-MCNC: 0.3 MG/DL
BUN SERPL-MCNC: 15 MG/DL
CALCIUM SERPL-MCNC: 9.9 MG/DL
CHLORIDE SERPL-SCNC: 98 MMOL/L
CO2 SERPL-SCNC: 35 MMOL/L
CREAT SERPL-MCNC: 0.8 MG/DL
ERYTHROCYTE [DISTWIDTH] IN BLOOD BY AUTOMATED COUNT: 16.2 %
EST. GFR  (AFRICAN AMERICAN): >60 ML/MIN/1.73 M^2
EST. GFR  (NON AFRICAN AMERICAN): >60 ML/MIN/1.73 M^2
GLUCOSE SERPL-MCNC: 142 MG/DL
HCT VFR BLD AUTO: 35.5 %
HGB BLD-MCNC: 11.4 G/DL
MCH RBC QN AUTO: 23.3 PG
MCHC RBC AUTO-ENTMCNC: 32.1 G/DL
MCV RBC AUTO: 72 FL
NEUTROPHILS # BLD AUTO: 3.6 K/UL
PLATELET # BLD AUTO: 265 K/UL
PMV BLD AUTO: 9.3 FL
POTASSIUM SERPL-SCNC: 3.3 MMOL/L
PROT SERPL-MCNC: 7.3 G/DL
RBC # BLD AUTO: 4.9 M/UL
SODIUM SERPL-SCNC: 141 MMOL/L
WBC # BLD AUTO: 7.25 K/UL

## 2018-07-05 PROCEDURE — 80053 COMPREHEN METABOLIC PANEL: CPT

## 2018-07-05 PROCEDURE — 3074F SYST BP LT 130 MM HG: CPT | Mod: CPTII,S$GLB,, | Performed by: INTERNAL MEDICINE

## 2018-07-05 PROCEDURE — 3079F DIAST BP 80-89 MM HG: CPT | Mod: CPTII,S$GLB,, | Performed by: INTERNAL MEDICINE

## 2018-07-05 PROCEDURE — 85027 COMPLETE CBC AUTOMATED: CPT

## 2018-07-05 PROCEDURE — 36415 COLL VENOUS BLD VENIPUNCTURE: CPT

## 2018-07-05 PROCEDURE — 99214 OFFICE O/P EST MOD 30 MIN: CPT | Mod: S$GLB,,, | Performed by: INTERNAL MEDICINE

## 2018-07-05 PROCEDURE — 99999 PR PBB SHADOW E&M-EST. PATIENT-LVL III: CPT | Mod: PBBFAC,,, | Performed by: INTERNAL MEDICINE

## 2018-07-05 RX ORDER — SODIUM CHLORIDE 0.9 % (FLUSH) 0.9 %
10 SYRINGE (ML) INJECTION
Status: CANCELLED | OUTPATIENT
Start: 2018-07-31

## 2018-07-05 RX ORDER — POTASSIUM CHLORIDE 20 MEQ/1
20 TABLET, EXTENDED RELEASE ORAL 2 TIMES DAILY
Qty: 90 TABLET | Refills: 3
Start: 2018-07-05 | End: 2018-08-17

## 2018-07-05 RX ORDER — HEPARIN 100 UNIT/ML
500 SYRINGE INTRAVENOUS
Status: CANCELLED | OUTPATIENT
Start: 2018-07-10

## 2018-07-05 RX ORDER — HEPARIN 100 UNIT/ML
500 SYRINGE INTRAVENOUS
Status: CANCELLED | OUTPATIENT
Start: 2018-07-31

## 2018-07-05 RX ORDER — SODIUM CHLORIDE 0.9 % (FLUSH) 0.9 %
10 SYRINGE (ML) INJECTION
Status: CANCELLED | OUTPATIENT
Start: 2018-07-10

## 2018-07-05 NOTE — PROGRESS NOTES
Subjective:       Patient ID: Karen Duke is a 73 y.o. female.    Chief Complaint: No chief complaint on file.    HPI PCP Dr. Sarina Macias.     She was diagnosed with left breast cancer on routine screening mammogram done in September 2017. A 1.1 centimeter lobulated mass was noticed in the 8:00 position in the left breast.  Images were done at [facility].  Biopsy was done on 9/22/17.  It was high-grade invasive ductal carcinoma - ER +96%, CO +94% and HER-2/bharti IHC 3+.  Ki-67 was 37%. She has h/o sickle cell trait with no manifestation.  No history of blood transfusion.     Underwent left breast lumpectomy on 10/12/17. Lumpectomy specimen showed 2 foci of high grade (grade 3) invasive carcinoma - measuring 10 millimeters and 13 millimeters. Margins negative.  1 sentinel lymph node removed and it was negative.    Received 12 doses of weekly Taxol/Herceptin from Nov 2017 until Feb 2018. 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 Then completed adjuvant RT to left breast on 5/10/18.    Restarted adjuvant Herceptin 6/15/18    Review of Systems   Constitutional: Negative for fever and unexpected weight change.   Respiratory: Negative for wheezing and stridor.    Gastrointestinal: Negative for abdominal pain and blood in stool.   Hematological: Negative for adenopathy. Does not bruise/bleed easily.         Objective:      Physical Exam   Constitutional: She is oriented to person, place, and time. No distress.   HENT:   Mouth/Throat: No oropharyngeal exudate.   Eyes: No scleral icterus.   Neck: Neck supple.   Cardiovascular: Normal rate.  Exam reveals no gallop.    Pulmonary/Chest: Effort normal. She has no wheezes. She has no rales.   Abdominal: Soft. There is no tenderness.   Musculoskeletal: She exhibits no edema.   Lymphadenopathy:     She has no cervical adenopathy.   Neurological: She is alert and oriented to person, place, and time.   Skin: She is not diaphoretic.         Assessment:     1. Cancer of left breast, stage 1,  estrogen receptor positive    2. Hypokalemia    3. Encounter for antineoplastic chemotherapy      Plan:   In summary this is a 72-year-old female with Stage I ER/PA/HER-2/bharti positive high-grade invasive ductal carcinoma of the left breast with Ki-67 at 37%.    Got Taxol/Herceptin from Nov 2017 until Feb 2018, then completed RT.    Started postoperative Herceptin.  Plan 14 doses, once every 3 weeks    Proceed with dose #2 scheduled for 7/9/18 and continue every 3 wks    Continue potassium chloride 40 mEq daily for hypokalemia.    Will plan to start her on aromatase inhibitor therapy during follow-up visit.    See me in 6 wks

## 2018-07-09 ENCOUNTER — INFUSION (OUTPATIENT)
Dept: INFUSION THERAPY | Facility: HOSPITAL | Age: 73
End: 2018-07-09
Attending: INTERNAL MEDICINE
Payer: MEDICARE

## 2018-07-09 VITALS
WEIGHT: 268 LBS | DIASTOLIC BLOOD PRESSURE: 80 MMHG | SYSTOLIC BLOOD PRESSURE: 132 MMHG | BODY MASS INDEX: 50.6 KG/M2 | RESPIRATION RATE: 18 BRPM | HEART RATE: 108 BPM | HEIGHT: 61 IN | TEMPERATURE: 98 F

## 2018-07-09 DIAGNOSIS — Z17.0 CANCER OF LEFT BREAST, STAGE 1, ESTROGEN RECEPTOR POSITIVE: ICD-10-CM

## 2018-07-09 DIAGNOSIS — C50.912 CANCER OF LEFT BREAST, STAGE 1, ESTROGEN RECEPTOR POSITIVE: ICD-10-CM

## 2018-07-09 DIAGNOSIS — Z51.11 ENCOUNTER FOR ANTINEOPLASTIC CHEMOTHERAPY: Primary | ICD-10-CM

## 2018-07-09 PROCEDURE — 96413 CHEMO IV INFUSION 1 HR: CPT

## 2018-07-09 PROCEDURE — 25000003 PHARM REV CODE 250: Performed by: INTERNAL MEDICINE

## 2018-07-09 PROCEDURE — 63600175 PHARM REV CODE 636 W HCPCS: Mod: JW,JG | Performed by: INTERNAL MEDICINE

## 2018-07-09 RX ORDER — HEPARIN 100 UNIT/ML
500 SYRINGE INTRAVENOUS
Status: DISCONTINUED | OUTPATIENT
Start: 2018-07-09 | End: 2018-07-09 | Stop reason: HOSPADM

## 2018-07-09 RX ORDER — SODIUM CHLORIDE 0.9 % (FLUSH) 0.9 %
10 SYRINGE (ML) INJECTION
Status: DISCONTINUED | OUTPATIENT
Start: 2018-07-09 | End: 2018-07-09 | Stop reason: HOSPADM

## 2018-07-09 RX ADMIN — HEPARIN SODIUM (PORCINE) LOCK FLUSH IV SOLN 100 UNIT/ML 500 UNITS: 100 SOLUTION at 02:07

## 2018-07-09 RX ADMIN — TRASTUZUMAB 730 MG: 150 INJECTION, POWDER, LYOPHILIZED, FOR SOLUTION INTRAVENOUS at 01:07

## 2018-07-09 RX ADMIN — SODIUM CHLORIDE: 9 INJECTION, SOLUTION INTRAVENOUS at 01:07

## 2018-07-09 NOTE — NURSING
Pt tolerated Herceptin chemo well. No adverse reaction noted. Pt education reinforced on chemo regimen, side effects, what to expect, and when to call __. Pt verbalized understanding. I reviewed pt calendar w/ pt and understanding verbalized. PAC deaccessed and flushed w/ NS and heparin per protocol.

## 2018-07-23 ENCOUNTER — OFFICE VISIT (OUTPATIENT)
Dept: ORTHOPEDICS | Facility: CLINIC | Age: 73
End: 2018-07-23
Payer: MEDICARE

## 2018-07-23 ENCOUNTER — HOSPITAL ENCOUNTER (OUTPATIENT)
Dept: RADIOLOGY | Facility: HOSPITAL | Age: 73
Discharge: HOME | End: 2018-07-23
Attending: ORTHOPAEDIC SURGERY
Payer: MEDICARE

## 2018-07-23 VITALS — BODY MASS INDEX: 50.6 KG/M2 | HEIGHT: 61 IN | WEIGHT: 268 LBS

## 2018-07-23 DIAGNOSIS — M65.311 TRIGGER FINGER OF RIGHT THUMB: ICD-10-CM

## 2018-07-23 DIAGNOSIS — M79.641 PAIN OF RIGHT HAND: ICD-10-CM

## 2018-07-23 DIAGNOSIS — M79.641 PAIN OF RIGHT HAND: Primary | ICD-10-CM

## 2018-07-23 PROCEDURE — 20550 NJX 1 TENDON SHEATH/LIGAMENT: CPT | Mod: S$PBB,F5,, | Performed by: ORTHOPAEDIC SURGERY

## 2018-07-23 PROCEDURE — 73120 X-RAY EXAM OF HAND: CPT | Mod: TC,FY,RT

## 2018-07-23 PROCEDURE — 99999 PR PBB SHADOW E&M-EST. PATIENT-LVL III: CPT | Mod: PBBFAC,,, | Performed by: ORTHOPAEDIC SURGERY

## 2018-07-23 PROCEDURE — 99214 OFFICE O/P EST MOD 30 MIN: CPT | Mod: S$PBB,25,, | Performed by: ORTHOPAEDIC SURGERY

## 2018-07-23 PROCEDURE — 73120 X-RAY EXAM OF HAND: CPT | Mod: 26,RT,, | Performed by: RADIOLOGY

## 2018-07-23 PROCEDURE — 20550 NJX 1 TENDON SHEATH/LIGAMENT: CPT | Mod: PBBFAC,PO | Performed by: ORTHOPAEDIC SURGERY

## 2018-07-23 RX ORDER — TRIAMCINOLONE ACETONIDE 40 MG/ML
40 INJECTION, SUSPENSION INTRA-ARTICULAR; INTRAMUSCULAR
Status: COMPLETED | OUTPATIENT
Start: 2018-07-23 | End: 2018-07-23

## 2018-07-23 RX ADMIN — TRIAMCINOLONE ACETONIDE 40 MG: 40 INJECTION, SUSPENSION INTRA-ARTICULAR; INTRAMUSCULAR at 11:07

## 2018-07-23 NOTE — PROGRESS NOTES
Patient, Karen Duke (MRN #[id number]), presented with a recorded BMI of 50.64 kg/m^2 consistent with the definition of morbid obesity (ICD-10 E66.01). The patient's morbid obesity was monitored, evaluated, addressed and/or treated. This addendum to the medical record is made on 07/23/2018.

## 2018-07-23 NOTE — LETTER
July 23, 2018        Sarina Macias MD  [address]  [address]  [place] [address]             [facility]  [address]  [place] [address]  Phone: [phone]   Patient: Karen Duke   MR Number: [id number]   YOB: 1945   Date of Visit: 7/23/2018       Dear Dr. Macias:    Thank you for referring Karen Duke to me for evaluation. Below are the relevant portions of my assessment and plan of care.            If you have questions, please do not hesitate to call me. I look forward to following Karen along with you.    Sincerely,      Finesse Devine Jr., MD           CC  No Recipients

## 2018-07-27 ENCOUNTER — LAB VISIT (OUTPATIENT)
Dept: LAB | Facility: HOSPITAL | Age: 73
End: 2018-07-27
Attending: INTERNAL MEDICINE
Payer: MEDICARE

## 2018-07-27 DIAGNOSIS — C50.912 CANCER OF LEFT BREAST, STAGE 1, ESTROGEN RECEPTOR POSITIVE: ICD-10-CM

## 2018-07-27 DIAGNOSIS — Z17.0 CANCER OF LEFT BREAST, STAGE 1, ESTROGEN RECEPTOR POSITIVE: ICD-10-CM

## 2018-07-27 LAB
ALBUMIN SERPL BCP-MCNC: 3.6 G/DL
ALP SERPL-CCNC: 71 U/L
ALT SERPL W/O P-5'-P-CCNC: 20 U/L
ANION GAP SERPL CALC-SCNC: 10 MMOL/L
AST SERPL-CCNC: 18 U/L
BILIRUB SERPL-MCNC: 0.2 MG/DL
BUN SERPL-MCNC: 16 MG/DL
CALCIUM SERPL-MCNC: 9.7 MG/DL
CHLORIDE SERPL-SCNC: 101 MMOL/L
CO2 SERPL-SCNC: 30 MMOL/L
CREAT SERPL-MCNC: 0.8 MG/DL
ERYTHROCYTE [DISTWIDTH] IN BLOOD BY AUTOMATED COUNT: 16.7 %
EST. GFR  (AFRICAN AMERICAN): >60 ML/MIN/1.73 M^2
EST. GFR  (NON AFRICAN AMERICAN): >60 ML/MIN/1.73 M^2
GLUCOSE SERPL-MCNC: 183 MG/DL
HCT VFR BLD AUTO: 34.6 %
HGB BLD-MCNC: 11.1 G/DL
MCH RBC QN AUTO: 23.4 PG
MCHC RBC AUTO-ENTMCNC: 32.1 G/DL
MCV RBC AUTO: 73 FL
NEUTROPHILS # BLD AUTO: 4.2 K/UL
PLATELET # BLD AUTO: 253 K/UL
PMV BLD AUTO: 9.5 FL
POTASSIUM SERPL-SCNC: 3.8 MMOL/L
PROT SERPL-MCNC: 7.9 G/DL
RBC # BLD AUTO: 4.75 M/UL
SODIUM SERPL-SCNC: 141 MMOL/L
WBC # BLD AUTO: 8 K/UL

## 2018-07-27 PROCEDURE — 80053 COMPREHEN METABOLIC PANEL: CPT

## 2018-07-27 PROCEDURE — 36415 COLL VENOUS BLD VENIPUNCTURE: CPT

## 2018-07-27 PROCEDURE — 85027 COMPLETE CBC AUTOMATED: CPT

## 2018-07-30 ENCOUNTER — INFUSION (OUTPATIENT)
Dept: INFUSION THERAPY | Facility: HOSPITAL | Age: 73
End: 2018-07-30
Attending: INTERNAL MEDICINE
Payer: MEDICARE

## 2018-07-30 VITALS
RESPIRATION RATE: 18 BRPM | BODY MASS INDEX: 50.6 KG/M2 | DIASTOLIC BLOOD PRESSURE: 69 MMHG | SYSTOLIC BLOOD PRESSURE: 135 MMHG | HEIGHT: 61 IN | TEMPERATURE: 98 F | WEIGHT: 268 LBS

## 2018-07-30 DIAGNOSIS — Z51.11 ENCOUNTER FOR ANTINEOPLASTIC CHEMOTHERAPY: Primary | ICD-10-CM

## 2018-07-30 DIAGNOSIS — C50.912 CANCER OF LEFT BREAST, STAGE 1, ESTROGEN RECEPTOR POSITIVE: ICD-10-CM

## 2018-07-30 DIAGNOSIS — Z17.0 CANCER OF LEFT BREAST, STAGE 1, ESTROGEN RECEPTOR POSITIVE: ICD-10-CM

## 2018-07-30 PROCEDURE — 25000003 PHARM REV CODE 250: Performed by: INTERNAL MEDICINE

## 2018-07-30 PROCEDURE — 96413 CHEMO IV INFUSION 1 HR: CPT

## 2018-07-30 PROCEDURE — 63600175 PHARM REV CODE 636 W HCPCS: Performed by: INTERNAL MEDICINE

## 2018-07-30 RX ORDER — HEPARIN 100 UNIT/ML
500 SYRINGE INTRAVENOUS
Status: DISCONTINUED | OUTPATIENT
Start: 2018-07-30 | End: 2018-07-30 | Stop reason: HOSPADM

## 2018-07-30 RX ORDER — SODIUM CHLORIDE 0.9 % (FLUSH) 0.9 %
10 SYRINGE (ML) INJECTION
Status: DISCONTINUED | OUTPATIENT
Start: 2018-07-30 | End: 2018-07-30 | Stop reason: HOSPADM

## 2018-07-30 RX ADMIN — HEPARIN SODIUM (PORCINE) LOCK FLUSH IV SOLN 100 UNIT/ML 500 UNITS: 100 SOLUTION at 12:07

## 2018-07-30 RX ADMIN — SODIUM CHLORIDE: 0.9 INJECTION, SOLUTION INTRAVENOUS at 11:07

## 2018-07-30 RX ADMIN — TRASTUZUMAB 730 MG: 150 INJECTION, POWDER, LYOPHILIZED, FOR SOLUTION INTRAVENOUS at 11:07

## 2018-07-30 NOTE — NURSING
Pt tolerated Herceptin well. No adverse reaction noted. Pt education reinforced on chemo regimen, side effects, what to expect, and when to call . Pt verbalized understanding. I reviewed pt calendar w/ pt and understanding verbalized. PAC deaccessed and flushed w/ NS and heparin per protocol.

## 2018-07-30 NOTE — PATIENT INSTRUCTIONS
YOU HAVE STARTED ON CHEMOTHERAPY. IF YOU EXPERIENCE ANY OF THE FOLLOWING PROBLEMS, CALL THE OFFICE IMMEDIATELY.    *FEVER .0 OR GREATER    *CHILLS, ESPECIALLY SHAKING CHILLS, OR SWEATING    *A SEVERE COUGH OR SORE THROAT, OR SINUS PAIN/     PRESSURE    *REDNESS, SWELLING, OR TENDERNESS AROUND A WOUND,     SORE, PIMPLE, RECTAL AREA, OR IV SITE    *SORES OR ULCERS IN THE MOUTH    *BLISTERS ON THE LIPS OR SKIN    *FREQUENT URGENCY TO URINATE OR A BURNING FEELING   WHEN YOU URINATE    *BLOOD IN THE URINE OR STOOL    *ANY UNEXPLAINED BRUISING OR PROLONGED BLEEDING,     (NOSEBLEEDS OR BLEEDING GUMS)    *LOOSE BOWEL MOVEMENTS THAT DO NOT RESPOND TO     IMODIUM OR MORE THAN THREE TIMES A DAY    *VOMITING UNRESPONSIVE TO ANTINAUSEA MEDICINE    *ANY UNUSUAL PHYSICAL SYMPTOMS THAT BEGAN AFTER     CHEMOTHERAPY    DURING WEEKDAYS, CALL AND ASK TO SPEAK DIRECTLY TO A NURSE.  AT OTHER TIMES, CALL THE OFFICE PHONE NUMBER; THE ANSWERING SERVICE WILL CONTACT THE ON-CALL PHYSICIAN.  SOMEONE IS AVAILABLE 24 HOURS A DAY, SEVEN DAYS A WEEK.

## 2018-07-30 NOTE — PLAN OF CARE
Problem: Patient Care Overview  Goal: Plan of Care Review  Outcome: Ongoing (interventions implemented as appropriate)  Pt states she feels well today, no complaints

## 2018-08-10 ENCOUNTER — TELEPHONE (OUTPATIENT)
Dept: HEMATOLOGY/ONCOLOGY | Facility: CLINIC | Age: 73
End: 2018-08-10

## 2018-08-10 DIAGNOSIS — C50.912 CANCER OF LEFT BREAST, STAGE 1, ESTROGEN RECEPTOR POSITIVE: Primary | ICD-10-CM

## 2018-08-10 DIAGNOSIS — Z17.0 CANCER OF LEFT BREAST, STAGE 1, ESTROGEN RECEPTOR POSITIVE: Primary | ICD-10-CM

## 2018-08-17 ENCOUNTER — LAB VISIT (OUTPATIENT)
Dept: LAB | Facility: HOSPITAL | Age: 73
End: 2018-08-17
Attending: INTERNAL MEDICINE
Payer: MEDICARE

## 2018-08-17 ENCOUNTER — OFFICE VISIT (OUTPATIENT)
Dept: HEMATOLOGY/ONCOLOGY | Facility: CLINIC | Age: 73
End: 2018-08-17
Payer: MEDICARE

## 2018-08-17 VITALS
HEART RATE: 76 BPM | RESPIRATION RATE: 16 BRPM | HEIGHT: 61 IN | DIASTOLIC BLOOD PRESSURE: 79 MMHG | TEMPERATURE: 98 F | BODY MASS INDEX: 50.61 KG/M2 | WEIGHT: 268.06 LBS | OXYGEN SATURATION: 95 % | SYSTOLIC BLOOD PRESSURE: 111 MMHG

## 2018-08-17 DIAGNOSIS — E87.6 HYPOKALEMIA: ICD-10-CM

## 2018-08-17 DIAGNOSIS — Z17.0 CANCER OF LEFT BREAST, STAGE 1, ESTROGEN RECEPTOR POSITIVE: ICD-10-CM

## 2018-08-17 DIAGNOSIS — M94.9 DISORDER OF CARTILAGE: ICD-10-CM

## 2018-08-17 DIAGNOSIS — C50.912 CANCER OF LEFT BREAST, STAGE 1, ESTROGEN RECEPTOR POSITIVE: Primary | ICD-10-CM

## 2018-08-17 DIAGNOSIS — C50.912 CANCER OF LEFT BREAST, STAGE 1, ESTROGEN RECEPTOR POSITIVE: ICD-10-CM

## 2018-08-17 DIAGNOSIS — Z79.811 AROMATASE INHIBITOR USE: ICD-10-CM

## 2018-08-17 DIAGNOSIS — Z51.11 ENCOUNTER FOR ANTINEOPLASTIC CHEMOTHERAPY: ICD-10-CM

## 2018-08-17 DIAGNOSIS — Z17.0 CANCER OF LEFT BREAST, STAGE 1, ESTROGEN RECEPTOR POSITIVE: Primary | ICD-10-CM

## 2018-08-17 DIAGNOSIS — R79.9 ABNORMAL FINDING OF BLOOD CHEMISTRY: ICD-10-CM

## 2018-08-17 LAB
ALBUMIN SERPL BCP-MCNC: 3.4 G/DL
ALP SERPL-CCNC: 66 U/L
ALT SERPL W/O P-5'-P-CCNC: 17 U/L
ANION GAP SERPL CALC-SCNC: 10 MMOL/L
AST SERPL-CCNC: 16 U/L
BILIRUB SERPL-MCNC: 0.3 MG/DL
BUN SERPL-MCNC: 14 MG/DL
CALCIUM SERPL-MCNC: 9.2 MG/DL
CHLORIDE SERPL-SCNC: 100 MMOL/L
CO2 SERPL-SCNC: 30 MMOL/L
CREAT SERPL-MCNC: 0.9 MG/DL
ERYTHROCYTE [DISTWIDTH] IN BLOOD BY AUTOMATED COUNT: 17.1 %
EST. GFR  (AFRICAN AMERICAN): >60 ML/MIN/1.73 M^2
EST. GFR  (NON AFRICAN AMERICAN): >60 ML/MIN/1.73 M^2
GLUCOSE SERPL-MCNC: 157 MG/DL
HCT VFR BLD AUTO: 35.8 %
HGB BLD-MCNC: 11.6 G/DL
MCH RBC QN AUTO: 23.8 PG
MCHC RBC AUTO-ENTMCNC: 32.4 G/DL
MCV RBC AUTO: 74 FL
NEUTROPHILS # BLD AUTO: 4.8 K/UL
PLATELET # BLD AUTO: 259 K/UL
PMV BLD AUTO: 9.6 FL
POTASSIUM SERPL-SCNC: 3.6 MMOL/L
PROT SERPL-MCNC: 7.3 G/DL
RBC # BLD AUTO: 4.87 M/UL
SODIUM SERPL-SCNC: 140 MMOL/L
WBC # BLD AUTO: 9.02 K/UL

## 2018-08-17 PROCEDURE — 85027 COMPLETE CBC AUTOMATED: CPT

## 2018-08-17 PROCEDURE — 99999 PR PBB SHADOW E&M-EST. PATIENT-LVL III: CPT | Mod: PBBFAC,,, | Performed by: INTERNAL MEDICINE

## 2018-08-17 PROCEDURE — 99215 OFFICE O/P EST HI 40 MIN: CPT | Mod: S$GLB,,, | Performed by: INTERNAL MEDICINE

## 2018-08-17 PROCEDURE — 36415 COLL VENOUS BLD VENIPUNCTURE: CPT

## 2018-08-17 PROCEDURE — 80053 COMPREHEN METABOLIC PANEL: CPT

## 2018-08-17 PROCEDURE — 3074F SYST BP LT 130 MM HG: CPT | Mod: CPTII,S$GLB,, | Performed by: INTERNAL MEDICINE

## 2018-08-17 PROCEDURE — 3078F DIAST BP <80 MM HG: CPT | Mod: CPTII,S$GLB,, | Performed by: INTERNAL MEDICINE

## 2018-08-17 RX ORDER — ACETAZOLAMIDE 250 MG/1
TABLET ORAL
COMMUNITY
Start: 2018-08-09

## 2018-08-17 RX ORDER — ANASTROZOLE 1 MG/1
1 TABLET ORAL DAILY
Qty: 90 TABLET | Refills: 3 | Status: SHIPPED | OUTPATIENT
Start: 2018-08-17 | End: 2019-08-15 | Stop reason: SDUPTHER

## 2018-08-17 RX ORDER — OFLOXACIN 3 MG/ML
SOLUTION/ DROPS OPHTHALMIC
COMMUNITY
Start: 2018-08-09 | End: 2018-08-17

## 2018-08-17 RX ORDER — OLMESARTAN MEDOXOMIL AND HYDROCHLOROTHIAZIDE 40/25 40; 25 MG/1; MG/1
TABLET ORAL
COMMUNITY
Start: 2018-07-27

## 2018-08-17 RX ORDER — POTASSIUM CHLORIDE 20 MEQ/1
20 TABLET, EXTENDED RELEASE ORAL 2 TIMES DAILY
Qty: 180 TABLET | Refills: 3 | Status: SHIPPED | OUTPATIENT
Start: 2018-08-17 | End: 2019-09-03 | Stop reason: SDUPTHER

## 2018-08-17 RX ORDER — NEOMYCIN SULFATE, POLYMYXIN B SULFATE, AND DEXAMETHASONE 3.5; 10000; 1 MG/G; [USP'U]/G; MG/G
OINTMENT OPHTHALMIC
COMMUNITY
Start: 2018-08-09

## 2018-08-17 RX ORDER — DUREZOL 0.5 MG/ML
EMULSION OPHTHALMIC
COMMUNITY
Start: 2018-08-09 | End: 2018-08-17

## 2018-08-17 NOTE — PROGRESS NOTES
Subjective:       Patient ID: Karen Duke is a 73 y.o. female.    Chief Complaint: Breast Cancer    HPI PCP Dr. Sarina Macias.     She was diagnosed with left breast cancer on routine screening mammogram - in September 2017. A 1.1 centimeter lobulated mass was noticed in the 8:00 position in the left breast.  Images were done at [facility].  Biopsy was done on 9/22/17.  It was high-grade invasive ductal carcinoma - ER +96%, MS +94% and HER-2/bharti IHC 3+.  Ki-67 was 37%. She has h/o sickle cell trait with no manifestation.  No history of blood transfusion.     Underwent left breast lumpectomy on 10/12/17. Lumpectomy specimen showed 2 foci of high grade (grade 3) invasive carcinoma - measuring 10 millimeters and 13 millimeters. Margins negative.  1 sentinel lymph node removed and it was negative.    Received 12 doses of weekly Taxol/Herceptin from Nov 2017 until Feb 2018. 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 Then completed adjuvant RT to left breast on 5/10/18.    Restarted adjuvant Herceptin 6/15/18. She got 3 doses.     Here for f/u.  She has been getting a lot of chest discomfort, when she gets her Herceptin.  She is very worried and concerned.  She wants to stop the Herceptin infusions.    Review of Systems   Constitutional: Negative for fever and unexpected weight change.   Respiratory: Negative for wheezing and stridor.    Gastrointestinal: Negative for abdominal pain and blood in stool.   Hematological: Negative for adenopathy. Does not bruise/bleed easily.         Objective:      Physical Exam   Constitutional: She is oriented to person, place, and time. No distress.   HENT:   Mouth/Throat: No oropharyngeal exudate.   Eyes: No scleral icterus.   Neck: Neck supple.   Cardiovascular: Normal rate. Exam reveals no gallop.   Pulmonary/Chest: Effort normal. She has no wheezes. She has no rales.   Abdominal: Soft. There is no tenderness.   Musculoskeletal: She exhibits no edema.   Lymphadenopathy:     She has no cervical  adenopathy.   Neurological: She is alert and oriented to person, place, and time.   Skin: She is not diaphoretic.         Assessment:     1. Cancer of left breast, stage 1, estrogen receptor positive    2. Encounter for antineoplastic chemotherapy    3. Hypokalemia      Plan:   In summary this is a 72-year-old female with Stage I ER/TX/HER-2/bharti positive high-grade invasive ductal carcinoma of the left breast with Ki-67 at 37%.    Got Taxol/Herceptin from Nov 2017 until Feb 2018, then completed RT.    Started postoperative Herceptin.  She got 3 doses.  Reviewed pros and cons of continuing Herceptin.  Given her concerns, symptoms, will discontinue Herceptin.  She wants to hold off on cardiology eval for now.    Will start Arimidex.  Discussed pros and cons.    Will have her see  to discuss about removing the chest port.    Will see her back for follow-up in 6 months.  Will schedule bone density, mammogram and blood work prior to follow-up in 6 months.    Will plan to start her on aromatase inhibitor therapy during follow-up visit.    See me in 6 months

## 2018-09-10 ENCOUNTER — TELEPHONE (OUTPATIENT)
Dept: HEMATOLOGY/ONCOLOGY | Facility: CLINIC | Age: 73
End: 2018-09-10

## 2018-09-10 NOTE — TELEPHONE ENCOUNTER
Rescheduled appts per pt request. Pt confirmed new appt dates and times.     ----- Message from Zane Mancuso sent at 9/10/2018  2:54 PM CDT -----  Contact: [phone]/self  Patient requesting to speak with the nurse ONLY concerning rescheduling her appointment   Please call and advise

## 2018-09-14 ENCOUNTER — PROCEDURE VISIT (OUTPATIENT)
Dept: SURGERY | Facility: CLINIC | Age: 73
End: 2018-09-14
Payer: MEDICARE

## 2018-09-14 VITALS
HEIGHT: 61 IN | SYSTOLIC BLOOD PRESSURE: 134 MMHG | WEIGHT: 268 LBS | TEMPERATURE: 98 F | BODY MASS INDEX: 50.6 KG/M2 | HEART RATE: 93 BPM | DIASTOLIC BLOOD PRESSURE: 75 MMHG

## 2018-09-14 DIAGNOSIS — Z85.3 HX OF BREAST CANCER: Primary | ICD-10-CM

## 2018-09-14 PROCEDURE — 36590 REMOVAL TUNNELED CV CATH: CPT | Mod: PBBFAC | Performed by: SURGERY

## 2018-09-14 PROCEDURE — 36590 REMOVAL TUNNELED CV CATH: CPT | Mod: S$PBB,,, | Performed by: SURGERY

## 2018-09-15 PROBLEM — Z85.3 HX OF BREAST CANCER: Status: ACTIVE | Noted: 2018-09-15

## 2018-09-15 NOTE — PROCEDURES
Procedures:  Date: 09-14-18     Procedures   PREOPERATIVE DIAGNOSIS:  left invasive breast cancer, status post systemic    chemotherapy treatment.     PROCEDURE: Removal of right subclavian vein anterior chest wall, 8-[place] Port-A-Cath.     POSTOPERATIVE DIAGNOSIS: Same       PRIMARY SURGEON: Michael Corrales M.D.  ASSISTANT SURGEON:  Bhargav Fenton MD (surgery resident)     ANESTHESIA: Local anesthesia.     PROCEDURE IN DETAIL: The patient was seen in the clinic. She underwent    informed consent for right-sided Port-A-Cath removal.  She was brought to the    minor procedure room.  She was placed in a supine position. The right anterior    chest was prepped and draped in a sterile fashion after the area was marked.    Local anesthesia was infiltrated at the prior Port-A-Cath insertion site. Skin    was incised sharply with a 15 blade scalpel. The subcutaneous tissue was    dissected with electrocautery to open the fibrous capsule from around the port    and catheter system. The catheter tract was identified. A figure-of-eight 3-0    Vicryl suture was placed around the catheter tract and this was ligated as the    catheter was pulled while the patient underwent Valsalva maneuver to minimize    risk of back bleeding or air embolism. The port and catheter system were    removed intact. The port and catheter were shown for gross identification. The    deep dermal and subcutaneous layers were reapproximated with interrupted deep    three-point fixation sutures down to the posterior fibrous capsule to obliterate  any potential dead space to minimize risk of post-procedure fluid or seroma    buildup. This was done with three interrupted sutures of this kind. With the    skin reapproximated, the skin itself was closed with a running 4-0 Monocryl    subcuticular skin closure. Mastisol, Steri-Strips, sterile Telfa gauze and    Tegaderm dressing were applied. Postop wound care and analgesic instructions    were  provided. The patient will follow up in clinic prn.  She will monitor for any    signs of infection.  She tolerated the procedure well without complication.  No specimens were sent.     Micahel Corrales MD     09-14-18

## 2018-09-15 NOTE — PATIENT INSTRUCTIONS
Keep wound covered for 48 hours.  May shower directly over Tegaderm (plastic).  Remove Tegaderm after 48 hours or sooner if underlying gauze gets wet while washing/showering.  After 48 hours you may shower directly over steri-strips which you can remove after 7-10 days or allow them to fall off on their own. If they fall off sooner, that is ok, and you may shower directly over wound.  May use Tylenol or ibuprofen as directed for pain.  You should have a follow up appointment in 2-3 weeks for wound check and pathology review.  You may need to follow up sooner if you have sutures that require removal.           Please call clinic at [phone] for any questions or signs of infection such as redness, swelling, abnormal drainage,pain, tenderness, or fever.

## 2018-09-28 ENCOUNTER — OFFICE VISIT (OUTPATIENT)
Dept: HEMATOLOGY/ONCOLOGY | Facility: CLINIC | Age: 73
End: 2018-09-28
Payer: MEDICARE

## 2018-09-28 ENCOUNTER — HOSPITAL ENCOUNTER (OUTPATIENT)
Dept: CARDIOLOGY | Facility: HOSPITAL | Age: 73
Discharge: HOME | End: 2018-09-28
Attending: INTERNAL MEDICINE
Payer: MEDICARE

## 2018-09-28 VITALS
OXYGEN SATURATION: 99 % | HEART RATE: 117 BPM | WEIGHT: 269.81 LBS | BODY MASS INDEX: 50.99 KG/M2 | TEMPERATURE: 98 F | DIASTOLIC BLOOD PRESSURE: 73 MMHG | RESPIRATION RATE: 16 BRPM | SYSTOLIC BLOOD PRESSURE: 117 MMHG

## 2018-09-28 DIAGNOSIS — Z17.0 CANCER OF LEFT BREAST, STAGE 1, ESTROGEN RECEPTOR POSITIVE: Primary | ICD-10-CM

## 2018-09-28 DIAGNOSIS — D53.9 NUTRITIONAL ANEMIA: ICD-10-CM

## 2018-09-28 DIAGNOSIS — Z79.811 AROMATASE INHIBITOR USE: ICD-10-CM

## 2018-09-28 DIAGNOSIS — C50.912 CANCER OF LEFT BREAST, STAGE 1, ESTROGEN RECEPTOR POSITIVE: ICD-10-CM

## 2018-09-28 DIAGNOSIS — Z17.0 CANCER OF LEFT BREAST, STAGE 1, ESTROGEN RECEPTOR POSITIVE: ICD-10-CM

## 2018-09-28 DIAGNOSIS — C50.912 CANCER OF LEFT BREAST, STAGE 1, ESTROGEN RECEPTOR POSITIVE: Primary | ICD-10-CM

## 2018-09-28 PROBLEM — Z51.11 ENCOUNTER FOR ANTINEOPLASTIC CHEMOTHERAPY: Status: RESOLVED | Noted: 2017-11-02 | Resolved: 2018-09-28

## 2018-09-28 LAB
DIASTOLIC DYSFUNCTION: NO
ESTIMATED PA SYSTOLIC PRESSURE: 22.01
GLOBAL PERICARDIAL EFFUSION: NORMAL
RETIRED EF AND QEF - SEE NOTES: 55 (ref 55–65)

## 2018-09-28 PROCEDURE — 1101F PT FALLS ASSESS-DOCD LE1/YR: CPT | Mod: CPTII,,, | Performed by: INTERNAL MEDICINE

## 2018-09-28 PROCEDURE — 99213 OFFICE O/P EST LOW 20 MIN: CPT | Mod: PBBFAC,PO | Performed by: INTERNAL MEDICINE

## 2018-09-28 PROCEDURE — 93306 TTE W/DOPPLER COMPLETE: CPT

## 2018-09-28 PROCEDURE — 3078F DIAST BP <80 MM HG: CPT | Mod: CPTII,,, | Performed by: INTERNAL MEDICINE

## 2018-09-28 PROCEDURE — 99213 OFFICE O/P EST LOW 20 MIN: CPT | Mod: S$PBB,,, | Performed by: INTERNAL MEDICINE

## 2018-09-28 PROCEDURE — 3074F SYST BP LT 130 MM HG: CPT | Mod: CPTII,,, | Performed by: INTERNAL MEDICINE

## 2018-09-28 PROCEDURE — 99999 PR PBB SHADOW E&M-EST. PATIENT-LVL III: CPT | Mod: PBBFAC,,, | Performed by: INTERNAL MEDICINE

## 2018-09-28 PROCEDURE — 93306 TTE W/DOPPLER COMPLETE: CPT | Mod: 26,,, | Performed by: INTERNAL MEDICINE

## 2018-09-28 NOTE — PROGRESS NOTES
Subjective:       Patient ID: Karen Duke is a 73 y.o. female.    Chief Complaint: Breast Cancer    HPI PCP Dr. Sarina Macias.     She was diagnosed with left breast cancer on routine screening mammogram - in September 2017. A 1.1 centimeter lobulated mass was noticed in the 8:00 position in the left breast.  Images were done at [facility].  Biopsy was done on 9/22/17.  It was high-grade invasive ductal carcinoma - ER +96%, DC +94% and HER-2/bharti IHC 3+.  Ki-67 was 37%.     She has h/o sickle cell trait with no manifestation.  No history of blood transfusion.     Underwent left breast lumpectomy on 10/12/17. Lumpectomy specimen showed 2 foci of high grade (grade 3) invasive carcinoma - measuring 10 millimeters and 13 millimeters. Margins negative.  1 sentinel lymph node removed and it was negative.    Received 12 doses of weekly Taxol/Herceptin from Nov 2017 until Feb 2018. 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 Then completed adjuvant RT to left breast on 5/10/18.    Restarted adjuvant Herceptin 6/15/18. She got 3 doses.  She then started to have lots of chest pains and decided to discontinue the Herceptin infusions.    Started Arimidex August 2018 and has been tolerating that well without any problems and issues.    Review of Systems   Constitutional: Negative for fever and unexpected weight change.   Respiratory: Negative for wheezing and stridor.    Gastrointestinal: Negative for abdominal pain and blood in stool.   Hematological: Negative for adenopathy. Does not bruise/bleed easily.         Objective:      Physical Exam   Constitutional: She is oriented to person, place, and time. No distress.   HENT:   Mouth/Throat: No oropharyngeal exudate.   Eyes: No scleral icterus.   Neck: Neck supple.   Cardiovascular: Normal rate. Exam reveals no gallop.   Pulmonary/Chest: Effort normal. She has no wheezes. She has no rales.   Abdominal: Soft. There is no tenderness.   Musculoskeletal: She exhibits no edema.   Lymphadenopathy:      She has no cervical adenopathy.   Neurological: She is alert and oriented to person, place, and time.   Skin: She is not diaphoretic.         Assessment:     1. Cancer of left breast, stage 1, estrogen receptor positive    2. Aromatase inhibitor use    3. Nutritional anemia       Plan:   In summary this is a 73-year-old female with Stage I ER/CA/HER-2/bharti positive high-grade invasive ductal carcinoma of the left breast with Ki-67 at 37%.    Got Taxol/Herceptin from Nov 2017 until Feb 2018, then completed RT.    Started postoperative Herceptin.  She got 3 doses.  Then discontinued secondary to chest pains.  She feels better now.    Has been taking Arimidex since August 2018, tolerating it well. She will continue this at least for 5 years.    Her chest port has been removed.    She wants to hold off on a mammogram for now as she is still sore - will schedule it for December along with a bone density study and see her back in the clinic for follow-up.    Will update her primary care physician, Dr. Sarina Macias of above

## 2018-10-01 NOTE — PROGRESS NOTES
INITIAL VISIT HISTORY:  A 73-year-old female presents for evaluation of right   thumb pain and swelling for the past month or two.  Symptoms are worse in the   morning and pain with gripping.  No numbness or tingling is reported.    No trauma is reported.    PAST MEDICAL HISTORY:  Significant for diabetes, hyperlipidemia and   hypertension.    PAST SURGICAL HISTORY:  Includes eye surgery.    FAMILY HISTORY:  Positive for breast cancer and lung cancer.    SOCIAL HISTORY:  The patient is a former smoker.  Drinks occasionally.    REVIEW OF SYSTEMS:  Negative for fever, chills, or rashes.    CURRENT MEDICATIONS:  Reviewed on chart.    ALLERGIES:  None.    PHYSICAL EXAMINATION:  GENERAL:  Well-developed, well-nourished female in no acute distress, alert and   oriented x3.  EXTREMITIES:  Examination of the upper extremities significant for the left hand   and thumb, demonstrating tenderness over the flexor tendon sheath, left thumb   with mild swelling.  Limited flexion and limited passive extension secondary to   pain.    IMPRESSION:  Triggering left thumb.    PLAN:  I explained the nature of the problem to the patient.  Recommended and   performed an injection.  After pause for timeout, she identified the left thumb   injected flexor tendon sheath with combination of Kenalog 20 mg, 0.5 mL   Xylocaine, sterile technique.  She tolerated the procedure well without   complication.    Recommended Advil by mouth.  Follow up in three to four weeks for recheck.      ELLIOT  dd: 07/23/2018 11:52:15 (CDT)  td: 07/23/2018 20:26:16 (CDT)  Doc ID   #[id number]  Job ID #[id number]    CC:        Lot #: [id number] Lot #: [id number]

## 2018-12-10 ENCOUNTER — HOSPITAL ENCOUNTER (OUTPATIENT)
Dept: RADIOLOGY | Facility: HOSPITAL | Age: 73
Discharge: HOME | End: 2018-12-10
Attending: INTERNAL MEDICINE
Payer: MEDICARE

## 2018-12-10 DIAGNOSIS — C50.912 CANCER OF LEFT BREAST, STAGE 1, ESTROGEN RECEPTOR POSITIVE: ICD-10-CM

## 2018-12-10 DIAGNOSIS — Z79.811 AROMATASE INHIBITOR USE: ICD-10-CM

## 2018-12-10 DIAGNOSIS — Z17.0 CANCER OF LEFT BREAST, STAGE 1, ESTROGEN RECEPTOR POSITIVE: ICD-10-CM

## 2018-12-10 PROCEDURE — 77080 DXA BONE DENSITY AXIAL: CPT | Mod: TC

## 2018-12-10 PROCEDURE — 77066 DX MAMMO INCL CAD BI: CPT | Mod: TC

## 2018-12-10 PROCEDURE — 77080 DXA BONE DENSITY AXIAL: CPT | Mod: 26,,, | Performed by: RADIOLOGY

## 2018-12-10 PROCEDURE — 77062 BREAST TOMOSYNTHESIS BI: CPT | Mod: 26,,, | Performed by: RADIOLOGY

## 2018-12-10 PROCEDURE — 77066 DX MAMMO INCL CAD BI: CPT | Mod: 26,,, | Performed by: RADIOLOGY

## 2018-12-13 ENCOUNTER — OFFICE VISIT (OUTPATIENT)
Dept: HEMATOLOGY/ONCOLOGY | Facility: CLINIC | Age: 73
End: 2018-12-13
Payer: MEDICARE

## 2018-12-13 VITALS
TEMPERATURE: 98 F | HEART RATE: 105 BPM | SYSTOLIC BLOOD PRESSURE: 123 MMHG | OXYGEN SATURATION: 98 % | RESPIRATION RATE: 16 BRPM | BODY MASS INDEX: 51.44 KG/M2 | WEIGHT: 272.25 LBS | DIASTOLIC BLOOD PRESSURE: 80 MMHG

## 2018-12-13 DIAGNOSIS — Z17.0 CANCER OF LEFT BREAST, STAGE 1, ESTROGEN RECEPTOR POSITIVE: Primary | ICD-10-CM

## 2018-12-13 DIAGNOSIS — M94.9 DISORDER OF CARTILAGE: ICD-10-CM

## 2018-12-13 DIAGNOSIS — Z79.811 AROMATASE INHIBITOR USE: ICD-10-CM

## 2018-12-13 DIAGNOSIS — C50.912 CANCER OF LEFT BREAST, STAGE 1, ESTROGEN RECEPTOR POSITIVE: Primary | ICD-10-CM

## 2018-12-13 PROCEDURE — 99999 PR PBB SHADOW E&M-EST. PATIENT-LVL IV: CPT | Mod: PBBFAC,,, | Performed by: INTERNAL MEDICINE

## 2018-12-13 PROCEDURE — 99213 OFFICE O/P EST LOW 20 MIN: CPT | Mod: S$GLB,,, | Performed by: INTERNAL MEDICINE

## 2018-12-13 PROCEDURE — 1101F PT FALLS ASSESS-DOCD LE1/YR: CPT | Mod: CPTII,S$GLB,, | Performed by: INTERNAL MEDICINE

## 2018-12-13 PROCEDURE — 3074F SYST BP LT 130 MM HG: CPT | Mod: CPTII,S$GLB,, | Performed by: INTERNAL MEDICINE

## 2018-12-13 PROCEDURE — 3079F DIAST BP 80-89 MM HG: CPT | Mod: CPTII,S$GLB,, | Performed by: INTERNAL MEDICINE

## 2018-12-13 RX ORDER — METOLAZONE 5 MG/1
TABLET ORAL
COMMUNITY
Start: 2018-10-30

## 2018-12-13 RX ORDER — PRAVASTATIN SODIUM 80 MG/1
TABLET ORAL
COMMUNITY
Start: 2018-10-10

## 2018-12-13 NOTE — PROGRESS NOTES
Subjective:       Patient ID: Karen Duke is a 73 y.o. female.    Chief Complaint: Breast Cancer    HPI PCP Dr. Sarina Macias.     She was diagnosed with left breast cancer on routine screening mammogram - in September 2017. A 1.1 centimeter lobulated mass was noticed in the 8:00 position in the left breast.  Images were done at [facility].  Biopsy was done on 9/22/17.  It was high-grade invasive ductal carcinoma - ER +96%, KS +94% and HER-2/bharti IHC 3+.  Ki-67 was 37%.     She has h/o sickle cell trait with no manifestation.  No history of blood transfusion.     Underwent left breast lumpectomy on 10/12/17. Lumpectomy specimen showed 2 foci of high grade (grade 3) invasive carcinoma - measuring 10 millimeters and 13 millimeters. Margins negative.  1 sentinel lymph node removed and it was negative.    Received 12 doses of weekly Taxol/Herceptin from Nov 2017 until Feb 2018. 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 Then completed adjuvant RT to left breast on 5/10/18.    Restarted adjuvant Herceptin 6/15/18. She got 3 doses.  She then started to have lots of chest pains and decided to discontinue the Herceptin infusions.    Started Arimidex August 2018 and has been tolerating that well without any problems and issues.    Review of Systems   Constitutional: Negative for fever and unexpected weight change.   Respiratory: Negative for wheezing and stridor.    Gastrointestinal: Negative for abdominal pain and blood in stool.   Hematological: Negative for adenopathy. Does not bruise/bleed easily.         Objective:      Physical Exam   Constitutional: She is oriented to person, place, and time. No distress.   HENT:   Mouth/Throat: No oropharyngeal exudate.   Eyes: No scleral icterus.   Neck: Neck supple.   Cardiovascular: Normal rate. Exam reveals no gallop.   Pulmonary/Chest: Effort normal. She has no wheezes. She has no rales.   Abdominal: Soft. There is no tenderness.   Musculoskeletal: She exhibits no edema.   Lymphadenopathy:      She has no cervical adenopathy.   Neurological: She is alert and oriented to person, place, and time.   Skin: She is not diaphoretic.         Assessment:     1. Cancer of left breast, stage 1, estrogen receptor positive    2. Disorder of cartilage     3. Aromatase inhibitor use      Plan:   In summary she has Stage I ER/OR/HER-2/bharti positive high-grade invasive ductal carcinoma of the left breast with Ki-67 at 37%.    S/p completion of Taxol/Herceptin and adjuavant RT.    On Arimidex since August 2018, tolerating it well. She will continue this at least for 5 years.    Her chest port has been removed.    Recent DEXA scan - Dec 2018 - WNL    Due for MMG in Dec 2019    RTC 6 mo

## 2019-01-08 ENCOUNTER — OFFICE VISIT (OUTPATIENT)
Dept: RADIATION ONCOLOGY | Facility: CLINIC | Age: 74
End: 2019-01-08
Payer: MEDICARE

## 2019-01-08 VITALS
HEIGHT: 61 IN | RESPIRATION RATE: 16 BRPM | TEMPERATURE: 98 F | HEART RATE: 94 BPM | DIASTOLIC BLOOD PRESSURE: 57 MMHG | BODY MASS INDEX: 51.14 KG/M2 | WEIGHT: 270.88 LBS | SYSTOLIC BLOOD PRESSURE: 92 MMHG

## 2019-01-08 DIAGNOSIS — Z17.0 CANCER OF LEFT BREAST, STAGE 1, ESTROGEN RECEPTOR POSITIVE: Primary | ICD-10-CM

## 2019-01-08 DIAGNOSIS — C50.912 CANCER OF LEFT BREAST, STAGE 1, ESTROGEN RECEPTOR POSITIVE: Primary | ICD-10-CM

## 2019-01-08 PROCEDURE — 99213 OFFICE O/P EST LOW 20 MIN: CPT | Mod: S$GLB,,, | Performed by: RADIOLOGY

## 2019-01-08 PROCEDURE — 3074F SYST BP LT 130 MM HG: CPT | Mod: CPTII,S$GLB,, | Performed by: RADIOLOGY

## 2019-01-08 PROCEDURE — 3074F PR MOST RECENT SYSTOLIC BLOOD PRESSURE < 130 MM HG: ICD-10-PCS | Mod: CPTII,S$GLB,, | Performed by: RADIOLOGY

## 2019-01-08 PROCEDURE — 1101F PT FALLS ASSESS-DOCD LE1/YR: CPT | Mod: CPTII,S$GLB,, | Performed by: RADIOLOGY

## 2019-01-08 PROCEDURE — 1101F PR PT FALLS ASSESS DOC 0-1 FALLS W/OUT INJ PAST YR: ICD-10-PCS | Mod: CPTII,S$GLB,, | Performed by: RADIOLOGY

## 2019-01-08 PROCEDURE — 3078F DIAST BP <80 MM HG: CPT | Mod: CPTII,S$GLB,, | Performed by: RADIOLOGY

## 2019-01-08 PROCEDURE — 99999 PR PBB SHADOW E&M-EST. PATIENT-LVL III: ICD-10-PCS | Mod: PBBFAC,,, | Performed by: RADIOLOGY

## 2019-01-08 PROCEDURE — 99213 PR OFFICE/OUTPT VISIT, EST, LEVL III, 20-29 MIN: ICD-10-PCS | Mod: S$GLB,,, | Performed by: RADIOLOGY

## 2019-01-08 PROCEDURE — 3078F PR MOST RECENT DIASTOLIC BLOOD PRESSURE < 80 MM HG: ICD-10-PCS | Mod: CPTII,S$GLB,, | Performed by: RADIOLOGY

## 2019-01-08 PROCEDURE — 99999 PR PBB SHADOW E&M-EST. PATIENT-LVL III: CPT | Mod: PBBFAC,,, | Performed by: RADIOLOGY

## 2019-01-08 NOTE — PATIENT INSTRUCTIONS
Continue arimidex as planned.  Repeat mammogram yearly.  Continue follow up with Dr. Wilkins  Return to see me as needed

## 2019-01-08 NOTE — PROGRESS NOTES
REFERRING PHYSICIAN: Garrick Wilkins MD     DIAGNOSIS: p T1 CN 0 M0, stage IA, infiltrating ductal carcinoma of the left breast     Radiation Treatment Summary:  Ms. duke completed radiation to the left breast as shown below.     Treatment Site Energy Dose/Fx (Gy) #Fx Total Dose (Gy) Start Date End Date   LT BREAST 18X 1.8 25 / 25 45 3/22/2018 4/30/2018   BOOST 16E 2 8 / 8 16 5/1/2018 5/10/2018      INTERVAL HISTORY:   Ms. Duke is a 73-year-old female with left breast cancer who completed radiation as above who returns for follow-up.    She was initially diagnosed with left breast cancer after evaluation for an abnormal mammogram revealed a lobulated lesion in the area o'clock position of the left breast. A core needle biopsy on September 18, 2017 revealed invasive ductal carcinoma, grade 3. This lesion is ER/[place] positive and HER-2 positive. On October 12, 2017, she underwent lumpectomy and sentinel node biopsy. The pathology revealed the left breast with invasive ductal carcinoma, grade 3, with associated DCIS. There were 2 foci noted, one measuring 1.3 cm and the other measuring 10 mm. The closest margin is 0.2 cm anteriorly. One out of one sentinel lymph node is negative for involvement. She completed chemotherapy with Taxol and Herceptin with the last cycle on February 21, 2018. To reduce her chance of local recurrence, she received postoperative radiation to the left breast as above.  She is here today for a routine follow-up.     Since completion of radiation, she has re started Herceptin which had to be discontinued due to chest pains after 3 cycles.  She is currently on anastrozole.  A repeat mammogram on December 10, 2018 is negative for any suspicious lesions.  She will repeat that in 1 year.   She reports intermittent discomfort in the left breast.  She denies left breast edema, erythema, or nipple discharge.  She also denies fever, night sweats, or weight loss.    PHYSICAL EXAMINATION:  VITAL SINGS: BP  "(!) 92/57   Pulse 94   Temp 98.2 °F (36.8 °C) (Oral)   Resp 16   Ht 5' 1" (1.549 m)   Wt 122.9 kg (270 lb 14.4 oz)   BMI 51.19 kg/m²   GENERAL: Patient is alert and oriented, in no acute distress.  HEENT: Extraocular muscles are intact.  Oropharynx is clear without lesions.  There is no cervical or supraclavicular adenopathy palpated.    CHEST: Breath sounds clear bilaterally.  No rales.  No rhonchi.  Unlabored respirations.  CARDIOVASCULAR: Normal S1, S2.  Normal rate.  Regular rhythm.  BREAST EXAM: The left breast is smaller than the right. The scar secondary to lumpectomy is noted in the upper inner aspect of the periareolar region. There is mild fibrosis associated with the scar. There is also a scar in the left axilla secondary to sentinel node biopsy. No abnormal masses palpated in the left breast or left axilla. The right breast and right axilla are also without palpable masses.  ABDOMEN: Bowel sounds normal.  No tenderness.  No abdominal distention.  No hepatomegaly.  No splenomegaly.  EXTREMITIES: No clubbing, cyanosis, edema  NEUROLOGIC: Cranial nerves II through XII are grossly intact.  Sensation is intact.  Strength is 5 out of 5 in the upper and lower extremities bilaterally.    ASSESSMENT:   This is a 73-year-old female with p T1 CN 0 M0, grade 3, multifocal, invasive ductal carcinoma of the left breast to underwent lumpectomy and sentinel node biopsy on January 12, 2017 followed by adjuvant chemotherapy followed by postoperative radiation, for a lesion which is ER/AK positive and HER-2 positive.    PLAN:   Ms. ignacio is doing well from the standpoint of left breast cancer.  She will continue anastrozole as planned.  She will repeat yearly mammograms and continue follow-up with Dr. Wilkins.  I plan to see her back as needed, unless symptoms warrant otherwise.    "

## 2019-01-24 ENCOUNTER — OFFICE VISIT (OUTPATIENT)
Dept: ORTHOPEDICS | Facility: CLINIC | Age: 74
End: 2019-01-24
Payer: MEDICARE

## 2019-01-24 VITALS — HEIGHT: 61 IN | WEIGHT: 270 LBS | BODY MASS INDEX: 50.98 KG/M2

## 2019-01-24 DIAGNOSIS — M65.342 TRIGGER RING FINGER OF LEFT HAND: ICD-10-CM

## 2019-01-24 DIAGNOSIS — G56.02 CARPAL TUNNEL SYNDROME OF LEFT WRIST: ICD-10-CM

## 2019-01-24 PROCEDURE — 99213 PR OFFICE/OUTPT VISIT, EST, LEVL III, 20-29 MIN: ICD-10-PCS | Mod: 25,S$GLB,, | Performed by: ORTHOPAEDIC SURGERY

## 2019-01-24 PROCEDURE — 99999 PR PBB SHADOW E&M-EST. PATIENT-LVL III: CPT | Mod: PBBFAC,,, | Performed by: ORTHOPAEDIC SURGERY

## 2019-01-24 PROCEDURE — 99213 OFFICE O/P EST LOW 20 MIN: CPT | Mod: 25,S$GLB,, | Performed by: ORTHOPAEDIC SURGERY

## 2019-01-24 PROCEDURE — 1101F PT FALLS ASSESS-DOCD LE1/YR: CPT | Mod: CPTII,S$GLB,, | Performed by: ORTHOPAEDIC SURGERY

## 2019-01-24 PROCEDURE — 20550 NJX 1 TENDON SHEATH/LIGAMENT: CPT | Mod: 51,F3,S$GLB, | Performed by: ORTHOPAEDIC SURGERY

## 2019-01-24 PROCEDURE — 99999 PR PBB SHADOW E&M-EST. PATIENT-LVL III: ICD-10-PCS | Mod: PBBFAC,,, | Performed by: ORTHOPAEDIC SURGERY

## 2019-01-24 PROCEDURE — 99499 RISK ADDL DX/OHS AUDIT: ICD-10-PCS | Mod: S$GLB,,, | Performed by: ORTHOPAEDIC SURGERY

## 2019-01-24 PROCEDURE — 1101F PR PT FALLS ASSESS DOC 0-1 FALLS W/OUT INJ PAST YR: ICD-10-PCS | Mod: CPTII,S$GLB,, | Performed by: ORTHOPAEDIC SURGERY

## 2019-01-24 PROCEDURE — 20550 PR INJECT TENDON SHEATH/LIGAMENT: ICD-10-PCS | Mod: F2,S$GLB,, | Performed by: ORTHOPAEDIC SURGERY

## 2019-01-24 PROCEDURE — 99499 UNLISTED E&M SERVICE: CPT | Mod: S$GLB,,, | Performed by: ORTHOPAEDIC SURGERY

## 2019-01-24 RX ORDER — TRIAMCINOLONE ACETONIDE 40 MG/ML
20 INJECTION, SUSPENSION INTRA-ARTICULAR; INTRAMUSCULAR
Status: COMPLETED | OUTPATIENT
Start: 2019-01-24 | End: 2019-01-24

## 2019-01-24 RX ADMIN — TRIAMCINOLONE ACETONIDE 20 MG: 40 INJECTION, SUSPENSION INTRA-ARTICULAR; INTRAMUSCULAR at 03:01

## 2019-01-24 NOTE — PROGRESS NOTES
Patient, Karen Duke (MRN #[id number]), presented with a recorded BMI of 51.02 kg/m^2 consistent with the definition of morbid obesity (ICD-10 E66.01). The patient's morbid obesity was monitored, evaluated, addressed and/or treated. This addendum to the medical record is made on 01/24/2019.

## 2019-01-24 NOTE — PROGRESS NOTES
HISTORY OF PRESENT ILLNESS:  Ms. Duke seen previously for triggering right   thumb, now having triggering in the opposite left hand including the middle and   ring fingers.  Symptoms are worse with use.  Occasionally, she has numbness at   night as well.    PHYSICAL EXAMINATION:  LEFT HAND:  There is tenderness over the flexor tendon sheath to the left middle   finger and left ring finger.  Mild triggering of the ring finger is noted.    Symptoms are worse with gripping.  Tinel sign is negative at the wrist.    Sensation intact.    IMPRESSION:  1.  Triggering left middle finger.  2.  Triggering left ring finger.  3.  Possible left carpal tunnel syndrome.    PLAN:  I explained the nature of the problem to the patient.  Recommend that we   try injections for each finger.  After pause for timeout, she identified the   left middle and left ring finger, each injected with combination of Kenalog 10   mg, 0.5 mL Xylocaine, sterile technique, tolerated the procedure well without   complication.    I have also given her wrist splint for the left wrist for nighttime use.    Recommend follow up in one month for recheck.  If symptoms persist, we may need   to get a nerve conduction study to check for carpal tunnel syndrome.      ELLIOT  dd: 01/24/2019 15:44:11 (CST)  td: 01/25/2019 10:03:15 (CST)  Doc ID   #[id number]  Job ID #[id number]    CC:

## 2019-03-14 ENCOUNTER — OFFICE VISIT (OUTPATIENT)
Dept: ORTHOPEDICS | Facility: CLINIC | Age: 74
End: 2019-03-14
Payer: MEDICARE

## 2019-03-14 VITALS — BODY MASS INDEX: 50.98 KG/M2 | WEIGHT: 270 LBS | HEIGHT: 61 IN

## 2019-03-14 DIAGNOSIS — M65.311 TRIGGER FINGER OF RIGHT THUMB: ICD-10-CM

## 2019-03-14 DIAGNOSIS — G56.02 CARPAL TUNNEL SYNDROME OF LEFT WRIST: Primary | ICD-10-CM

## 2019-03-14 PROCEDURE — 1101F PT FALLS ASSESS-DOCD LE1/YR: CPT | Mod: CPTII,S$GLB,, | Performed by: ORTHOPAEDIC SURGERY

## 2019-03-14 PROCEDURE — 99999 PR PBB SHADOW E&M-EST. PATIENT-LVL III: ICD-10-PCS | Mod: PBBFAC,,, | Performed by: ORTHOPAEDIC SURGERY

## 2019-03-14 PROCEDURE — 99213 OFFICE O/P EST LOW 20 MIN: CPT | Mod: S$GLB,,, | Performed by: ORTHOPAEDIC SURGERY

## 2019-03-14 PROCEDURE — 99999 PR PBB SHADOW E&M-EST. PATIENT-LVL III: CPT | Mod: PBBFAC,,, | Performed by: ORTHOPAEDIC SURGERY

## 2019-03-14 PROCEDURE — 99213 PR OFFICE/OUTPT VISIT, EST, LEVL III, 20-29 MIN: ICD-10-PCS | Mod: S$GLB,,, | Performed by: ORTHOPAEDIC SURGERY

## 2019-03-14 PROCEDURE — 1101F PR PT FALLS ASSESS DOC 0-1 FALLS W/OUT INJ PAST YR: ICD-10-PCS | Mod: CPTII,S$GLB,, | Performed by: ORTHOPAEDIC SURGERY

## 2019-03-14 RX ORDER — DICLOFENAC SODIUM 10 MG/G
2 GEL TOPICAL 3 TIMES DAILY
Qty: 1 TUBE | Refills: 3 | Status: SHIPPED | OUTPATIENT
Start: 2019-03-14

## 2019-03-14 NOTE — PROGRESS NOTES
HISTORY OF PRESENT ILLNESS:  Ms. Duke in followup triggering left hand, is   doing much better.  No further triggering reported, but she does have ongoing   symptoms in the left hand including numbness and tingling at night.  She does   use a wrist splint with some slight improvement, but it still bothers her and   wakes her up.  She occasionally has triggering of the right thumb, but it is not   bad enough to need an injection today.    PHYSICAL EXAMINATION:  Left hand demonstrates mildly positive Tinel sign over   the median nerve, left wrist.  Range of motion of fingers full.  No triggering.     strength slightly decreased.    IMPRESSION:  1.  Triggering left hand, improved.  2.  Possible left carpal tunnel syndrome.    PLAN:  I would like her to continue with her wrist brace, but I have ordered a   nerve conduction study left hand to check for carpal tunnel.  Follow up after   the nerve test is complete.      ELLIOT  dd: 03/14/2019 14:16:47 (CDT)  td: 03/15/2019 09:10:05 (CDT)  Doc ID   #[id number]  Job ID #[id number]    CC:

## 2019-05-17 ENCOUNTER — TELEPHONE (OUTPATIENT)
Dept: HEMATOLOGY/ONCOLOGY | Facility: CLINIC | Age: 74
End: 2019-05-17

## 2019-05-17 NOTE — TELEPHONE ENCOUNTER
This nurse spoke to pt to reschedule 6/14 appt. Pt rescheduled to 6/21. Appt slips placed in the mail.

## 2019-06-06 NOTE — TELEPHONE ENCOUNTER
----- Message from Radha Otto sent at 10/12/2017  3:01 PM CDT -----  Contact: Bita Patino [phone]  Calling to talk to nurse concerning her treatment options. Please advice    [email or web address]/hour

## 2019-06-20 ENCOUNTER — LAB VISIT (OUTPATIENT)
Dept: LAB | Facility: HOSPITAL | Age: 74
End: 2019-06-20
Attending: INTERNAL MEDICINE
Payer: MEDICARE

## 2019-06-20 DIAGNOSIS — M94.9 DISORDER OF CARTILAGE: ICD-10-CM

## 2019-06-20 DIAGNOSIS — Z17.0 CANCER OF LEFT BREAST, STAGE 1, ESTROGEN RECEPTOR POSITIVE: ICD-10-CM

## 2019-06-20 DIAGNOSIS — C50.912 CANCER OF LEFT BREAST, STAGE 1, ESTROGEN RECEPTOR POSITIVE: ICD-10-CM

## 2019-06-20 LAB
25(OH)D3+25(OH)D2 SERPL-MCNC: 5 NG/ML (ref 30–96)
ALBUMIN SERPL BCP-MCNC: 3.6 G/DL (ref 3.5–5.2)
ALP SERPL-CCNC: 59 U/L (ref 55–135)
ALT SERPL W/O P-5'-P-CCNC: 11 U/L (ref 10–44)
ANION GAP SERPL CALC-SCNC: 10 MMOL/L (ref 8–16)
AST SERPL-CCNC: 14 U/L (ref 10–40)
BASOPHILS # BLD AUTO: 0.01 K/UL (ref 0–0.2)
BASOPHILS NFR BLD: 0.1 % (ref 0–1.9)
BILIRUB SERPL-MCNC: 0.3 MG/DL (ref 0.1–1)
BUN SERPL-MCNC: 18 MG/DL (ref 8–23)
CALCIUM SERPL-MCNC: 9.7 MG/DL (ref 8.7–10.5)
CHLORIDE SERPL-SCNC: 101 MMOL/L (ref 95–110)
CO2 SERPL-SCNC: 30 MMOL/L (ref 23–29)
CREAT SERPL-MCNC: 0.9 MG/DL (ref 0.5–1.4)
DIFFERENTIAL METHOD: ABNORMAL
EOSINOPHIL # BLD AUTO: 0.1 K/UL (ref 0–0.5)
EOSINOPHIL NFR BLD: 1.5 % (ref 0–8)
ERYTHROCYTE [DISTWIDTH] IN BLOOD BY AUTOMATED COUNT: 16.5 % (ref 11.5–14.5)
EST. GFR  (AFRICAN AMERICAN): >60 ML/MIN/1.73 M^2
EST. GFR  (NON AFRICAN AMERICAN): >60 ML/MIN/1.73 M^2
GLUCOSE SERPL-MCNC: 157 MG/DL (ref 70–110)
HCT VFR BLD AUTO: 34.6 % (ref 37–48.5)
HGB BLD-MCNC: 10.9 G/DL (ref 12–16)
LYMPHOCYTES # BLD AUTO: 3.8 K/UL (ref 1–4.8)
LYMPHOCYTES NFR BLD: 50.4 % (ref 18–48)
MCH RBC QN AUTO: 23.7 PG (ref 27–31)
MCHC RBC AUTO-ENTMCNC: 31.5 G/DL (ref 32–36)
MCV RBC AUTO: 75 FL (ref 82–98)
MONOCYTES # BLD AUTO: 0.4 K/UL (ref 0.3–1)
MONOCYTES NFR BLD: 5.2 % (ref 4–15)
NEUTROPHILS # BLD AUTO: 3.2 K/UL (ref 1.8–7.7)
NEUTROPHILS NFR BLD: 42.7 % (ref 38–73)
PLATELET # BLD AUTO: 293 K/UL (ref 150–350)
PMV BLD AUTO: 9 FL (ref 9.2–12.9)
POTASSIUM SERPL-SCNC: 3.8 MMOL/L (ref 3.5–5.1)
PROT SERPL-MCNC: 7.5 G/DL (ref 6–8.4)
RBC # BLD AUTO: 4.59 M/UL (ref 4–5.4)
SODIUM SERPL-SCNC: 141 MMOL/L (ref 136–145)
WBC # BLD AUTO: 7.48 K/UL (ref 3.9–12.7)

## 2019-06-20 PROCEDURE — 80053 COMPREHEN METABOLIC PANEL: CPT

## 2019-06-20 PROCEDURE — 85025 COMPLETE CBC W/AUTO DIFF WBC: CPT

## 2019-06-20 PROCEDURE — 82306 VITAMIN D 25 HYDROXY: CPT

## 2019-06-20 PROCEDURE — 36415 COLL VENOUS BLD VENIPUNCTURE: CPT

## 2019-06-21 ENCOUNTER — OFFICE VISIT (OUTPATIENT)
Dept: HEMATOLOGY/ONCOLOGY | Facility: CLINIC | Age: 74
End: 2019-06-21
Payer: MEDICARE

## 2019-06-21 VITALS
WEIGHT: 276.44 LBS | HEART RATE: 80 BPM | OXYGEN SATURATION: 97 % | DIASTOLIC BLOOD PRESSURE: 57 MMHG | SYSTOLIC BLOOD PRESSURE: 124 MMHG | TEMPERATURE: 98 F | BODY MASS INDEX: 52.24 KG/M2 | RESPIRATION RATE: 16 BRPM

## 2019-06-21 DIAGNOSIS — Z79.811 AROMATASE INHIBITOR USE: ICD-10-CM

## 2019-06-21 DIAGNOSIS — Z17.0 CANCER OF LEFT BREAST, STAGE 1, ESTROGEN RECEPTOR POSITIVE: Primary | ICD-10-CM

## 2019-06-21 DIAGNOSIS — M94.9 DISORDER OF CARTILAGE: ICD-10-CM

## 2019-06-21 DIAGNOSIS — M65.342 TRIGGER RING FINGER OF LEFT HAND: ICD-10-CM

## 2019-06-21 DIAGNOSIS — E55.9 VITAMIN D DEFICIENCY: ICD-10-CM

## 2019-06-21 DIAGNOSIS — C50.912 CANCER OF LEFT BREAST, STAGE 1, ESTROGEN RECEPTOR POSITIVE: Primary | ICD-10-CM

## 2019-06-21 DIAGNOSIS — G56.02 CARPAL TUNNEL SYNDROME OF LEFT WRIST: ICD-10-CM

## 2019-06-21 PROCEDURE — 3078F PR MOST RECENT DIASTOLIC BLOOD PRESSURE < 80 MM HG: ICD-10-PCS | Mod: CPTII,S$GLB,, | Performed by: INTERNAL MEDICINE

## 2019-06-21 PROCEDURE — 99999 PR PBB SHADOW E&M-EST. PATIENT-LVL III: ICD-10-PCS | Mod: PBBFAC,,, | Performed by: INTERNAL MEDICINE

## 2019-06-21 PROCEDURE — 99214 OFFICE O/P EST MOD 30 MIN: CPT | Mod: S$GLB,,, | Performed by: INTERNAL MEDICINE

## 2019-06-21 PROCEDURE — 1101F PT FALLS ASSESS-DOCD LE1/YR: CPT | Mod: CPTII,S$GLB,, | Performed by: INTERNAL MEDICINE

## 2019-06-21 PROCEDURE — 99214 PR OFFICE/OUTPT VISIT, EST, LEVL IV, 30-39 MIN: ICD-10-PCS | Mod: S$GLB,,, | Performed by: INTERNAL MEDICINE

## 2019-06-21 PROCEDURE — 3074F SYST BP LT 130 MM HG: CPT | Mod: CPTII,S$GLB,, | Performed by: INTERNAL MEDICINE

## 2019-06-21 PROCEDURE — 3078F DIAST BP <80 MM HG: CPT | Mod: CPTII,S$GLB,, | Performed by: INTERNAL MEDICINE

## 2019-06-21 PROCEDURE — 1101F PR PT FALLS ASSESS DOC 0-1 FALLS W/OUT INJ PAST YR: ICD-10-PCS | Mod: CPTII,S$GLB,, | Performed by: INTERNAL MEDICINE

## 2019-06-21 PROCEDURE — 3074F PR MOST RECENT SYSTOLIC BLOOD PRESSURE < 130 MM HG: ICD-10-PCS | Mod: CPTII,S$GLB,, | Performed by: INTERNAL MEDICINE

## 2019-06-21 PROCEDURE — 99999 PR PBB SHADOW E&M-EST. PATIENT-LVL III: CPT | Mod: PBBFAC,,, | Performed by: INTERNAL MEDICINE

## 2019-06-21 RX ORDER — CHOLECALCIFEROL (VITAMIN D3) 1250 MCG
50000 TABLET ORAL WEEKLY
Qty: 12 TABLET | Refills: 3 | Status: SHIPPED | OUTPATIENT
Start: 2019-06-21

## 2019-06-21 NOTE — ASSESSMENT & PLAN NOTE
-follows with Dr. Devine  -encouraged her to schedule nerve conduction studies as recommended by Dr. Devine as the pain and numbness in the left hand/wrist is worsening

## 2019-06-21 NOTE — PROGRESS NOTES
PATIENT: Karen Duke  MRN: [id number]  DATE: 6/21/2019    Diagnosis:   1. Cancer of left breast, stage 1, estrogen receptor positive    2. Disorder of cartilage     3. Aromatase inhibitor use    4. Trigger ring finger of left hand    5. Vitamin D deficiency    6. Carpal tunnel syndrome of left wrist      Chief Complaint: Breast Cancer    Subjective:     Pertinent Medical History:     PCP Dr. Sarina Macias.     She was diagnosed with left breast cancer on routine screening mammogram - in September 2017. A 1.1 centimeter lobulated mass was noticed in the 8:00 position in the left breast.  Images were done at [facility].  Biopsy was done on 9/22/17.  It was high-grade invasive ductal carcinoma - ER +96%, AK +94% and HER-2/bharti IHC 3+.  Ki-67 was 37%.      She has h/o sickle cell trait with no manifestation.  No history of blood transfusion.     Underwent left breast lumpectomy on 10/12/17. Lumpectomy specimen showed 2 foci of high grade (grade 3) invasive carcinoma - measuring 10 millimeters and 13 millimeters. Margins negative.  1 sentinel lymph node removed and it was negative.     Received 12 doses of weekly Taxol/Herceptin from Nov 2017 until Feb 2018. 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 Then completed adjuvant RT to left breast on 5/10/18.     Restarted adjuvant Herceptin 6/15/18. She got 3 doses.  She then started to have lots of chest pains and decided to discontinue the Herceptin infusions.  Last dose July 2018     Started Arimidex August 2018 and has been tolerating that well without any problems and issues.    Interval History:   -she is here for follow-up of carcinoma of the left breast  -she is on Arimidex since August 2018 without any problems and is tolerating it well  -she complains of worsening pain and numbness in the left hand and wrist  -she saw Dr. Devine before and further evaluation with nerve conduction studies was recommended  -complains of generalized tiredness and fatigue    Past Medical, Surgical,  Family, and Social histories reviewed.    Medication and Allergies reviewed.    Review of Systems  CONSTITUTIONAL: Weight stable. Appetite good. No fevers.  RESPIRATORY: No cough or congestion.  CARDIOVASCULAR: No chest pain or difficulty breathing.  GASTROINTESTINAL: No abdominal pain or nausea. No change in bowel habits.    Objective:     Vitals:    06/21/19 1021   BP: (!) 124/57   Pulse: 80   Resp: 16   Temp: 98 °F (36.7 °C)   TempSrc: Oral   SpO2: 97%   Weight: 125.4 kg (276 lb 7.3 oz)     BMI: Body mass index is 52.24 kg/m².    Physical Exam  General appearance: no acute distress. conversant  Ear, Nose, Mouth, Throat: oropharynx clear. mucous membranes moist. no oral ulcers. no gum bleeding.  Neck: no masses or enlarged lymph nodes  Lungs: clear to auscultation. no rales. breathing nonlabored  Heart: rhythm regular. no gallops. no edema.  Abdomen: soft and nontender. no ascites. no hepatosplenomegaly.  Neurologic/Psychiatric: alert. oriented to time, place and person. no anxiety or agitation.  Breast exam-no palpable masses on the left breast.  No axillary lymphadenopathy    Assessment:       1. Cancer of left breast, stage 1, estrogen receptor positive    2. Disorder of cartilage     3. Aromatase inhibitor use    4. Trigger ring finger of left hand    5. Vitamin D deficiency    6. Carpal tunnel syndrome of left wrist      Plan:     Cancer of left breast, stage 1, estrogen receptor positive  -she has stage I triple positive high-grade left breast invasive carcinoma status post lumpectomy October 2017  -completed adjuvant Herceptin based chemotherapy in July 2018  -completed radiation therapy to the left breast May 2018  -started Arimidex August 2018  -doing well from breast cancer standpoint  -will check mammogram and see her back in 6 months    Aromatase inhibitor use  -started August 2018  -plan 5 years of therapy  -tolerating it well    Vitamin D deficiency  -severe vitamin-D deficiency noted on labs, level  is 5  -will start vitamin D3, 76870 units weekly  -will repeat vitamin-D level in 6 months    Carpal tunnel syndrome of left wrist  -follows with Dr. Devine  -encouraged her to schedule nerve conduction studies as recommended by Dr. Devine as the pain and numbness in the left hand/wrist is worsening

## 2019-06-21 NOTE — ASSESSMENT & PLAN NOTE
-severe vitamin-D deficiency noted on labs, level is 5  -will start vitamin D3, 11000 units weekly  -will repeat vitamin-D level in 6 months

## 2019-06-21 NOTE — ASSESSMENT & PLAN NOTE
-she has stage I triple positive high-grade left breast invasive carcinoma status post lumpectomy October 2017  -completed adjuvant Herceptin based chemotherapy in July 2018  -completed radiation therapy to the left breast May 2018  -started Arimidex August 2018  -doing well from breast cancer standpoint  -will check mammogram and see her back in 6 months

## 2019-07-02 ENCOUNTER — LAB VISIT (OUTPATIENT)
Dept: LAB | Facility: HOSPITAL | Age: 74
End: 2019-07-02
Attending: PSYCHIATRY & NEUROLOGY
Payer: MEDICARE

## 2019-07-02 ENCOUNTER — OFFICE VISIT (OUTPATIENT)
Dept: NEUROLOGY | Facility: CLINIC | Age: 74
End: 2019-07-02
Payer: MEDICARE

## 2019-07-02 VITALS
BODY MASS INDEX: 52.11 KG/M2 | HEART RATE: 85 BPM | HEIGHT: 61 IN | SYSTOLIC BLOOD PRESSURE: 103 MMHG | WEIGHT: 276 LBS | DIASTOLIC BLOOD PRESSURE: 68 MMHG

## 2019-07-02 DIAGNOSIS — E53.8 LOW SERUM VITAMIN B12: ICD-10-CM

## 2019-07-02 DIAGNOSIS — E11.8 TYPE 2 DIABETES MELLITUS WITH COMPLICATION, UNSPECIFIED WHETHER LONG TERM INSULIN USE: ICD-10-CM

## 2019-07-02 DIAGNOSIS — G60.3 IDIOPATHIC PROGRESSIVE NEUROPATHY: ICD-10-CM

## 2019-07-02 DIAGNOSIS — G60.3 IDIOPATHIC PROGRESSIVE NEUROPATHY: Primary | ICD-10-CM

## 2019-07-02 DIAGNOSIS — E66.01 CLASS 3 SEVERE OBESITY WITH SERIOUS COMORBIDITY AND BODY MASS INDEX (BMI) OF 50.0 TO 59.9 IN ADULT, UNSPECIFIED OBESITY TYPE: ICD-10-CM

## 2019-07-02 DIAGNOSIS — M79.641 BILATERAL HAND PAIN: ICD-10-CM

## 2019-07-02 DIAGNOSIS — Z85.3 HISTORY OF BREAST CANCER: ICD-10-CM

## 2019-07-02 DIAGNOSIS — M79.642 BILATERAL HAND PAIN: ICD-10-CM

## 2019-07-02 LAB
ESTIMATED AVG GLUCOSE: 157 MG/DL (ref 68–131)
FOLATE SERPL-MCNC: 10.9 NG/ML (ref 4–24)
HBA1C MFR BLD HPLC: 7.1 % (ref 4–5.6)
VIT B12 SERPL-MCNC: 300 PG/ML (ref 210–950)

## 2019-07-02 PROCEDURE — 86334 IMMUNOFIX E-PHORESIS SERUM: CPT

## 2019-07-02 PROCEDURE — 3078F PR MOST RECENT DIASTOLIC BLOOD PRESSURE < 80 MM HG: ICD-10-PCS | Mod: CPTII,S$GLB,, | Performed by: PSYCHIATRY & NEUROLOGY

## 2019-07-02 PROCEDURE — 82607 VITAMIN B-12: CPT

## 2019-07-02 PROCEDURE — 84165 PROTEIN E-PHORESIS SERUM: CPT | Mod: 26,,, | Performed by: PATHOLOGY

## 2019-07-02 PROCEDURE — 3074F SYST BP LT 130 MM HG: CPT | Mod: CPTII,S$GLB,, | Performed by: PSYCHIATRY & NEUROLOGY

## 2019-07-02 PROCEDURE — 86334 IMMUNOFIX E-PHORESIS SERUM: CPT | Mod: 26,,, | Performed by: PATHOLOGY

## 2019-07-02 PROCEDURE — 99999 PR PBB SHADOW E&M-EST. PATIENT-LVL IV: ICD-10-PCS | Mod: PBBFAC,,, | Performed by: PSYCHIATRY & NEUROLOGY

## 2019-07-02 PROCEDURE — 82746 ASSAY OF FOLIC ACID SERUM: CPT

## 2019-07-02 PROCEDURE — 99204 PR OFFICE/OUTPT VISIT, NEW, LEVL IV, 45-59 MIN: ICD-10-PCS | Mod: S$GLB,,, | Performed by: PSYCHIATRY & NEUROLOGY

## 2019-07-02 PROCEDURE — 3045F PR MOST RECENT HEMOGLOBIN A1C LEVEL 7.0-9.0%: CPT | Mod: CPTII,S$GLB,, | Performed by: PSYCHIATRY & NEUROLOGY

## 2019-07-02 PROCEDURE — 3045F PR MOST RECENT HEMOGLOBIN A1C LEVEL 7.0-9.0%: ICD-10-PCS | Mod: CPTII,S$GLB,, | Performed by: PSYCHIATRY & NEUROLOGY

## 2019-07-02 PROCEDURE — 86334 PATHOLOGIST INTERPRETATION IFE: ICD-10-PCS | Mod: 26,,, | Performed by: PATHOLOGY

## 2019-07-02 PROCEDURE — 1101F PT FALLS ASSESS-DOCD LE1/YR: CPT | Mod: CPTII,S$GLB,, | Performed by: PSYCHIATRY & NEUROLOGY

## 2019-07-02 PROCEDURE — 84165 PROTEIN E-PHORESIS SERUM: CPT

## 2019-07-02 PROCEDURE — 36415 COLL VENOUS BLD VENIPUNCTURE: CPT

## 2019-07-02 PROCEDURE — 99999 PR PBB SHADOW E&M-EST. PATIENT-LVL IV: CPT | Mod: PBBFAC,,, | Performed by: PSYCHIATRY & NEUROLOGY

## 2019-07-02 PROCEDURE — 83036 HEMOGLOBIN GLYCOSYLATED A1C: CPT

## 2019-07-02 PROCEDURE — 1101F PR PT FALLS ASSESS DOC 0-1 FALLS W/OUT INJ PAST YR: ICD-10-PCS | Mod: CPTII,S$GLB,, | Performed by: PSYCHIATRY & NEUROLOGY

## 2019-07-02 PROCEDURE — 3078F DIAST BP <80 MM HG: CPT | Mod: CPTII,S$GLB,, | Performed by: PSYCHIATRY & NEUROLOGY

## 2019-07-02 PROCEDURE — 99204 OFFICE O/P NEW MOD 45 MIN: CPT | Mod: S$GLB,,, | Performed by: PSYCHIATRY & NEUROLOGY

## 2019-07-02 PROCEDURE — 84165 PATHOLOGIST INTERPRETATION SPE: ICD-10-PCS | Mod: 26,,, | Performed by: PATHOLOGY

## 2019-07-02 PROCEDURE — 3074F PR MOST RECENT SYSTOLIC BLOOD PRESSURE < 130 MM HG: ICD-10-PCS | Mod: CPTII,S$GLB,, | Performed by: PSYCHIATRY & NEUROLOGY

## 2019-07-02 RX ORDER — GABAPENTIN 300 MG/1
300 CAPSULE ORAL 2 TIMES DAILY PRN
Qty: 60 CAPSULE | Refills: 3 | Status: SHIPPED | OUTPATIENT
Start: 2019-07-02 | End: 2019-08-06

## 2019-07-02 NOTE — PROGRESS NOTES
Neurology Clinic Visit  Primary Care Provider: Sarina Macias MD   Referring Provider: Finesse Devine Jr., *   Date of Visit: 07/02/2019       chief complaint:   Chief Complaint   Patient presents with    Numbness     and pain in the left hand and starting to develope in the right hand       History of Present Illness  Karen Duke is a 74 y.o. right handed female I have been asked to consult for evaluation bilateral hand paresthesia. She reports numbness in both hands for the past six months (left more than). She has history of breast cancer s/p resection and underwent chemotherapy and radiation. She completed chemotherapy and radiation about 1 year ago. She denies neck pain. She have similar paresthesia in her feet but left hand is much worse. She has had diabetes mellitus for 15-20 years that is managed by her PCP.  B12 low 300s six months ago.  She has recently saw Dr. Devine who gave injection in left hand but there was no improvement in the pain.       Patient Active Problem List    Diagnosis Date Noted    Vitamin D deficiency 06/21/2019    Trigger ring finger of left hand 01/24/2019    Carpal tunnel syndrome of left wrist 01/24/2019    Aromatase inhibitor use 09/28/2018    Hx of breast cancer 09/15/2018    Trigger finger of right thumb 07/23/2018    Hypokalemia 11/29/2017    Essential hypertension 10/10/2017    HLD (hyperlipidemia) 10/10/2017    DM (diabetes mellitus) 10/10/2017    Obesity 10/10/2017    Cancer of left breast, stage 1, estrogen receptor positive 09/27/2017    Screening for colon cancer 08/03/2015     Past Medical History:   Diagnosis Date    Breast cancer 10/2017    right breast    Diabetes mellitus     Hyperlipidemia     Hypertension      Past Surgical History:   Procedure Laterality Date    BREAST BIOPSY Left 10/2017    BREAST LUMPECTOMY Left 10/2018    COLONOSCOPY N/A 8/3/2015    [facility]O    EYE SURGERY Right     cataract     Hammer toe Left     [id number]-CATH Right 10/12/2017    [facility] at [facility] OR    LUMPECTOMY-BREAST needle localized with [place] Node Injection Left 10/12/2017    [facility] by [facility]ia Wallace [facility] at [facility] OR     Family History   Problem Relation Age of Onset    Breast cancer Maternal Aunt     Lung cancer Maternal Grandmother     Breast cancer Maternal Cousin          Current Outpatient Medications   Medication Sig    anastrozole (ARIMIDEX) 1 mg Tab Take 1 tablet (1 mg total) by mouth once daily.    aspirin (ECOTRIN) 81 MG EC tablet Take 81 mg by mouth once daily.    cholecalciferol, vitamin D3, 50,000 unit Tab Take 50,000 Units by mouth once a week.    diclofenac sodium (VOLTAREN) 1 % Gel Apply 2 g topically 3 (three) times daily.    latanoprost 0.005 % ophthalmic solution     loratadine (CLARITIN) 5 mg chewable tablet Take 5 mg by mouth once daily.    metformin (GLUCOPHAGE-XR) 500 MG 24 hr tablet Take 500 mg by mouth daily with breakfast.    metOLazone (ZAROXOLYN) 5 MG tablet     potassium chloride SA (K-DUR,KLOR-CON) 20 MEQ tablet Take 1 tablet (20 mEq total) by mouth 2 (two) times daily.    pravastatin (PRAVACHOL) 40 MG tablet 40 mg 2 (two) times daily.     acetaZOLAMIDE (DIAMOX) 250 MG tablet     FLUAD 0854-2187, 65 YR UP,,PF, 45 mcg (15 mcg x 3)/0.5 mL Syrg     ibuprofen (ADVIL,MOTRIN) 100 mg/5 mL suspension Take by mouth every 6 (six) hours as needed for Temperature greater than.    neomycin-polymyxin-dexamethasone (DEXACINE) 3.5 mg/g-10,000 unit/g-0.1 % Oint     olmesartan-hydrochlorothiazide (BENICAR HCT) 40-25 mg per tablet     ondansetron (ZOFRAN) 4 MG tablet Take 1 tablet (4 mg total) by mouth every 6 (six) hours as needed for Nausea.    peg 3350-electrolytes-vit C (MOVIPREP) 100-7.5-2.691 gram PwPk Take as directed    pravastatin (PRAVACHOL) 80 MG tablet     traMADol (ULTRAM) 50 mg tablet      No current facility-administered medications for this  "visit.      Review of patient's allergies indicates:  No Known Allergies  Social History     Socioeconomic History    Marital status: Single     Spouse name: Not on file    Number of children: Not on file    Years of education: Not on file    Highest education level: Not on file   Occupational History    Not on file   Social Needs    Financial resource strain: Not on file    Food insecurity:     Worry: Not on file     Inability: Not on file    Transportation needs:     Medical: Not on file     Non-medical: Not on file   Tobacco Use    Smoking status: Former Smoker     Packs/day: 0.90     Years: 5.00     Pack years: 4.50     Types: Cigarettes    Smokeless tobacco: Former User   Substance and Sexual Activity    Alcohol use: Yes     Alcohol/week: 0.6 oz     Types: 1 Shots of liquor per week    Drug use: No    Sexual activity: Not on file   Lifestyle    Physical activity:     Days per week: Not on file     Minutes per session: Not on file    Stress: Not on file   Relationships    Social connections:     Talks on phone: Not on file     Gets together: Not on file     Attends Zoroastrianism service: Not on file     Active member of club or organization: Not on file     Attends meetings of clubs or organizations: Not on file     Relationship status: Not on file   Other Topics Concern    Not on file   Social History Narrative    Not on file       Review of Systems  Constitutional: negative  Eyes: negative  Ears, nose, mouth, throat, and face: negative  Respiratory: negative  Cardiovascular: negative  Gastrointestinal: negative  Genitourinary:negative  Integument/breast: negative  Hematologic/lymphatic: negative  Musculoskeletal:negative  Neurological: negative  Behavioral/Psych: negative  Endocrine: negative  Allergic/Immunologic: negative    Objective:  Vital signs in last 24 hours:    Vitals:    07/02/19 1330   BP: 103/68   Pulse: 85   Weight: 125.2 kg (276 lb 0.3 oz)   Height: 5' 1" (1.549 m)       Body mass " index is 52.15 kg/m².     General: no acute distress, well nourished, well-groomed  CVS: RRR, no murmur, gallops or rubs  Respiratory: Clear to ausculation  Extremities: no edema    Neurological Examination:    HIGHER INTEGRATIVE FUNCTIONS:  -Normal attention span and concentration; immediately responds to questions and commands  -Oriented to time, place and person  -Recent and remote memory intact  -Language normal (no aphasia or dysarthria)  -Normal fund of knowledge    CN:  -PERRLA, visual fields full, unable to visualize optic discs due to small pupils on fundus exam   -EOMI with normal saccades and smooth pursuit  -Facial sensation intact bilaterally  -Facial strength/movement intact bilaterally  -Hearing intact to voice  -Palate elevates symmetrically  -Normal shoulder shrug and head turn  -Tongue protrudes midline    MOTOR: (left/right graded 1-5)  -UE: 5/5 deltoids; 5/5 biceps, triceps; 5/5 wrist flexors, extensors; 5/5 interosseous; 5/5   -LEs: 5/5 hip flexion, extension; 5/5 knee flexion, extension; 5/5 ankle flexion, extension  -Tone: normal  -No pronator drift, no orbiting    SENSORY:  -Light touch, temperature sensation intact bilaterally; decrease vibration in feet distally    REFLEXES:  -2+ upper and lower bilaterally  -Flexor plantar reflex bilaterally  -No clonus    COORDINATION:  -FNF, HKS, MARIAMA intact bilaterally    GAIT:  -Normal casual gait         Assessment/Plan:    1. Bilateral hand pain, rule out carpal tunnel  2. Peripheral neuropathy  3. Low serum b12  4. History of breast cancer, per oncology  5. DMII, per PCP  6. Obesity    Plan:  EMG/NCS bilateral upper extremities and left lower extremity.  Trial of gabapentin.  Neuropathy labs.  Wrist splint at night.  Followup with PCP for diabetes management.  Recommend healthy diet and exercise.    Side effects discussed with patient. SHe understood.  I discussed assessment and plan with patient and answered the questions that she had.     Part  of patient note might have been created using Dragon Dictation.  Any errors in syntax or even information may not have been identified and edited on initial review prior to signing this note.

## 2019-07-02 NOTE — LETTER
July 4, 2019      Finesse Devine Jr., MD  [address]  [address]  [facility] [address]           [facility] Neurology  [address]  [facility] [address]  Phone: [phone]  Fax: [phone]          Patient: Karen Duke   MR Number: [id number]   YOB: 1945   Date of Visit: 7/2/2019       Dear Dr. Finesse Devine Jr.:    Thank you for referring Karen Duke to me for evaluation. Attached you will find relevant portions of my assessment and plan of care.    If you have questions, please do not hesitate to call me. I look forward to following Karen Duke along with you.    Sincerely,    Mirza Correia MD    Enclosure  CC:  No Recipients    If you would like to receive this communication electronically, please contact [email or web address] or [phone] to request more information on [employer] access.    For providers and/or their staff who would like to refer a patient to [facility], please contact us through our one-stop-shop provider referral line, [facility], at [phone].    If you feel you have received this communication in error or would no longer like to receive these types of communications, please e-mail [email or web address]

## 2019-07-03 LAB
ALBUMIN SERPL ELPH-MCNC: 3.87 G/DL (ref 3.35–5.55)
ALPHA1 GLOB SERPL ELPH-MCNC: 0.38 G/DL (ref 0.17–0.41)
ALPHA2 GLOB SERPL ELPH-MCNC: 0.89 G/DL (ref 0.43–0.99)
B-GLOBULIN SERPL ELPH-MCNC: 0.86 G/DL (ref 0.5–1.1)
GAMMA GLOB SERPL ELPH-MCNC: 1.2 G/DL (ref 0.67–1.58)
INTERPRETATION SERPL IFE-IMP: NORMAL
PATHOLOGIST INTERPRETATION IFE: NORMAL
PATHOLOGIST INTERPRETATION SPE: NORMAL
PROT SERPL-MCNC: 7.2 G/DL (ref 6–8.4)

## 2019-08-06 ENCOUNTER — OFFICE VISIT (OUTPATIENT)
Dept: NEUROLOGY | Facility: CLINIC | Age: 74
End: 2019-08-06
Payer: MEDICARE

## 2019-08-06 VITALS
WEIGHT: 276 LBS | BODY MASS INDEX: 52.11 KG/M2 | HEART RATE: 110 BPM | SYSTOLIC BLOOD PRESSURE: 100 MMHG | HEIGHT: 61 IN | DIASTOLIC BLOOD PRESSURE: 65 MMHG

## 2019-08-06 DIAGNOSIS — G62.9 PERIPHERAL POLYNEUROPATHY: ICD-10-CM

## 2019-08-06 DIAGNOSIS — E53.8 LOW SERUM VITAMIN B12: Primary | ICD-10-CM

## 2019-08-06 PROCEDURE — 3074F SYST BP LT 130 MM HG: CPT | Mod: CPTII,S$GLB,, | Performed by: PSYCHIATRY & NEUROLOGY

## 2019-08-06 PROCEDURE — 99213 PR OFFICE/OUTPT VISIT, EST, LEVL III, 20-29 MIN: ICD-10-PCS | Mod: S$GLB,,, | Performed by: PSYCHIATRY & NEUROLOGY

## 2019-08-06 PROCEDURE — 3078F PR MOST RECENT DIASTOLIC BLOOD PRESSURE < 80 MM HG: ICD-10-PCS | Mod: CPTII,S$GLB,, | Performed by: PSYCHIATRY & NEUROLOGY

## 2019-08-06 PROCEDURE — 1101F PT FALLS ASSESS-DOCD LE1/YR: CPT | Mod: CPTII,S$GLB,, | Performed by: PSYCHIATRY & NEUROLOGY

## 2019-08-06 PROCEDURE — 1101F PR PT FALLS ASSESS DOC 0-1 FALLS W/OUT INJ PAST YR: ICD-10-PCS | Mod: CPTII,S$GLB,, | Performed by: PSYCHIATRY & NEUROLOGY

## 2019-08-06 PROCEDURE — 99999 PR PBB SHADOW E&M-EST. PATIENT-LVL IV: CPT | Mod: PBBFAC,,, | Performed by: PSYCHIATRY & NEUROLOGY

## 2019-08-06 PROCEDURE — 99999 PR PBB SHADOW E&M-EST. PATIENT-LVL IV: ICD-10-PCS | Mod: PBBFAC,,, | Performed by: PSYCHIATRY & NEUROLOGY

## 2019-08-06 PROCEDURE — 99213 OFFICE O/P EST LOW 20 MIN: CPT | Mod: S$GLB,,, | Performed by: PSYCHIATRY & NEUROLOGY

## 2019-08-06 PROCEDURE — 3078F DIAST BP <80 MM HG: CPT | Mod: CPTII,S$GLB,, | Performed by: PSYCHIATRY & NEUROLOGY

## 2019-08-06 PROCEDURE — 3074F PR MOST RECENT SYSTOLIC BLOOD PRESSURE < 130 MM HG: ICD-10-PCS | Mod: CPTII,S$GLB,, | Performed by: PSYCHIATRY & NEUROLOGY

## 2019-08-06 NOTE — PROGRESS NOTES
Neurology Clinic Visit  Primary Care Provider: Sarina Macias MD   Referring Provider: No ref. provider found   Date of Visit: 08/06/2019       chief complaint:   Chief Complaint   Patient presents with    Follow-up       Internal hisotry  She did not start gabapentin due to potential side effects. She is doing ok with using topical cream that she get from over the counter. She is waiting for EMG/NCS. B12 level 300.     History of Present Illness  Karen Duke is a 74 y.o. right handed female I have been asked to consult for evaluation bilateral hand paresthesia. She reports numbness in both hands for the past six months (left more than). She has history of breast cancer s/p resection and underwent chemotherapy and radiation. She completed chemotherapy and radiation about 1 year ago. She denies neck pain. She have similar paresthesia in her feet but left hand is much worse. She has had diabetes mellitus for 15-20 years that is managed by her PCP.  B12 low 300s six months ago.  She has recently saw Dr. Devine who gave injection in left hand but there was no improvement in the pain.       Patient Active Problem List    Diagnosis Date Noted    Vitamin D deficiency 06/21/2019    Trigger ring finger of left hand 01/24/2019    Carpal tunnel syndrome of left wrist 01/24/2019    Aromatase inhibitor use 09/28/2018    Hx of breast cancer 09/15/2018    Trigger finger of right thumb 07/23/2018    Hypokalemia 11/29/2017    Essential hypertension 10/10/2017    HLD (hyperlipidemia) 10/10/2017    DM (diabetes mellitus) 10/10/2017    Obesity 10/10/2017    Cancer of left breast, stage 1, estrogen receptor positive 09/27/2017    Screening for colon cancer 08/03/2015     Past Medical History:   Diagnosis Date    Breast cancer 10/2017    right breast    Diabetes mellitus     Hyperlipidemia     Hypertension      Past Surgical History:   Procedure Laterality Date    BREAST BIOPSY Left 10/2017    BREAST LUMPECTOMY  Left 10/2018    COLONOSCOPY N/A 8/3/2015    [facility]    EYE SURGERY Right     cataract    Hammer toe Left     EMPYICQGQ-UJQV-F-CATH Right 10/12/2017    [facility] at [facility] OR    LUMPECTOMY-BREAST needle localized with [place] Node Injection Left 10/12/2017    [facility] at [facility] OR     Family History   Problem Relation Age of Onset    Breast cancer Maternal Aunt     Lung cancer Maternal Grandmother     Breast cancer Maternal Cousin          Current Outpatient Medications   Medication Sig    acetaZOLAMIDE (DIAMOX) 250 MG tablet     anastrozole (ARIMIDEX) 1 mg Tab Take 1 tablet (1 mg total) by mouth once daily.    aspirin (ECOTRIN) 81 MG EC tablet Take 81 mg by mouth once daily.    cholecalciferol, vitamin D3, 50,000 unit Tab Take 50,000 Units by mouth once a week.    diclofenac sodium (VOLTAREN) 1 % Gel Apply 2 g topically 3 (three) times daily.    FLUAD 0171-6022, 65 YR UP,,PF, 45 mcg (15 mcg x 3)/0.5 mL Syrg     ibuprofen (ADVIL,MOTRIN) 100 mg/5 mL suspension Take by mouth every 6 (six) hours as needed for Temperature greater than.    latanoprost 0.005 % ophthalmic solution     loratadine (CLARITIN) 5 mg chewable tablet Take 5 mg by mouth once daily.    metformin (GLUCOPHAGE-XR) 500 MG 24 hr tablet Take 500 mg by mouth daily with breakfast.    metOLazone (ZAROXOLYN) 5 MG tablet     neomycin-polymyxin-dexamethasone (DEXACINE) 3.5 mg/g-10,000 unit/g-0.1 % Oint     olmesartan-hydrochlorothiazide (BENICAR HCT) 40-25 mg per tablet     ondansetron (ZOFRAN) 4 MG tablet Take 1 tablet (4 mg total) by mouth every 6 (six) hours as needed for Nausea.    peg 3350-electrolytes-vit C (MOVIPREP) 100-7.5-2.691 gram PwPk Take as directed    potassium chloride SA (K-DUR,KLOR-CON) 20 MEQ tablet Take 1 tablet (20 mEq total) by mouth 2 (two) times daily.    pravastatin (PRAVACHOL) 40 MG tablet 40 mg 2 (two) times daily.      pravastatin (PRAVACHOL) 80 MG tablet     traMADol (ULTRAM) 50 mg tablet     gabapentin (NEURONTIN) 300 MG capsule Take 1 capsule (300 mg total) by mouth 2 (two) times daily as needed (Nerve pain).     No current facility-administered medications for this visit.          Review of patient's allergies indicates:  No Known Allergies  Social History     Socioeconomic History    Marital status: Single     Spouse name: Not on file    Number of children: Not on file    Years of education: Not on file    Highest education level: Not on file   Occupational History    Not on file   Social Needs    Financial resource strain: Not on file    Food insecurity:     Worry: Not on file     Inability: Not on file    Transportation needs:     Medical: Not on file     Non-medical: Not on file   Tobacco Use    Smoking status: Former Smoker     Packs/day: 0.90     Years: 5.00     Pack years: 4.50     Types: Cigarettes    Smokeless tobacco: Former User   Substance and Sexual Activity    Alcohol use: Yes     Alcohol/week: 0.6 oz     Types: 1 Shots of liquor per week    Drug use: No    Sexual activity: Not on file   Lifestyle    Physical activity:     Days per week: Not on file     Minutes per session: Not on file    Stress: Not on file   Relationships    Social connections:     Talks on phone: Not on file     Gets together: Not on file     Attends Islam service: Not on file     Active member of club or organization: Not on file     Attends meetings of clubs or organizations: Not on file     Relationship status: Not on file   Other Topics Concern    Not on file   Social History Narrative    Not on file       Review of Systems  Constitutional: negative  Eyes: negative  Ears, nose, mouth, throat, and face: negative  Respiratory: negative  Cardiovascular: negative  Gastrointestinal: negative  Genitourinary:negative  Integument/breast: negative  Hematologic/lymphatic: negative  Musculoskeletal:negative  Neurological:  "negative  Behavioral/Psych: negative  Endocrine: negative  Allergic/Immunologic: negative    Objective:  Vital signs in last 24 hours:    Vitals:    08/06/19 1410   BP: 100/65   Pulse: 110   Weight: 125.2 kg (276 lb 0.3 oz)   Height: 5' 1" (1.549 m)       Body mass index is 52.15 kg/m².   General: no acute distress, well nourished, well-groomed  CVS: RRR, no murmur, gallops or rubs  Respiratory: Clear to ausculation  Extremities: no edema     Neurological Examination:     HIGHER INTEGRATIVE FUNCTIONS:  -Normal attention span and concentration; immediately responds to questions and commands  -Oriented to time, place and person  -Recent and remote memory intact  -Language normal (no aphasia or dysarthria)  -Normal fund of knowledge     CN:  -PERRLA, visual fields full, unable to visualize optic discs due to small pupils on fundus exam   -EOMI with normal saccades and smooth pursuit  -Facial sensation intact bilaterally  -Facial strength/movement intact bilaterally  -Hearing intact to voice  -Palate elevates symmetrically  -Normal shoulder shrug and head turn  -Tongue protrudes midline     MOTOR: (left/right graded 1-5)  -UE: 5/5 deltoids; 5/5 biceps, triceps; 5/5 wrist flexors, extensors; 5/5 interosseous; 5/5   -LEs: 5/5 hip flexion, extension; 5/5 knee flexion, extension; 5/5 ankle flexion, extension  -Tone: normal  -No pronator drift, no orbiting     SENSORY:  -Light touch, temperature sensation intact bilaterally; decrease vibration in feet distally     REFLEXES:  -2+ upper and lower bilaterally  -Flexor plantar reflex bilaterally  -No clonus     COORDINATION:  -FNF, HKS, MARIAMA intact bilaterally     GAIT:  -Normal casual gait            Assessment/Plan:     1. Bilateral hand pain, rule out carpal tunnel  2. Peripheral neuropathy  3. Low serum b12  4. History of breast cancer, per oncology  5. DMII, per PCP  6. Obesity     Plan:  EMG/NCS bilateral upper extremities and left lower extremity.  Start b12 and repeat " labs in 3 months  Wrist splint at night.  She did not want to try an oral medication at this time  Followup with PCP for diabetes management.  Recommend healthy diet and exercise.     I discussed assessment and plan with patient and answered the questions that she had.      Part of patient note might have been created using Dragon Dictation.  Any errors in syntax or even information may not have been identified and edited on initial review prior to signing this note.

## 2019-08-15 DIAGNOSIS — Z17.0 CANCER OF LEFT BREAST, STAGE 1, ESTROGEN RECEPTOR POSITIVE: ICD-10-CM

## 2019-08-15 DIAGNOSIS — C50.912 CANCER OF LEFT BREAST, STAGE 1, ESTROGEN RECEPTOR POSITIVE: ICD-10-CM

## 2019-08-15 RX ORDER — ANASTROZOLE 1 MG/1
TABLET ORAL
Qty: 90 TABLET | Refills: 3 | Status: SHIPPED | OUTPATIENT
Start: 2019-08-15 | End: 2020-08-05

## 2019-09-03 DIAGNOSIS — E87.6 HYPOKALEMIA: ICD-10-CM

## 2019-09-03 RX ORDER — POTASSIUM CHLORIDE 20 MEQ/1
TABLET, EXTENDED RELEASE ORAL
Qty: 180 TABLET | Refills: 3 | Status: SHIPPED | OUTPATIENT
Start: 2019-09-03 | End: 2020-08-05

## 2019-10-07 ENCOUNTER — PROCEDURE VISIT (OUTPATIENT)
Dept: PHYSICAL MEDICINE AND REHAB | Facility: CLINIC | Age: 74
End: 2019-10-07
Payer: MEDICARE

## 2019-10-07 ENCOUNTER — TELEPHONE (OUTPATIENT)
Dept: NEUROLOGY | Facility: CLINIC | Age: 74
End: 2019-10-07

## 2019-10-07 DIAGNOSIS — G60.3 IDIOPATHIC PROGRESSIVE NEUROPATHY: ICD-10-CM

## 2019-10-07 DIAGNOSIS — M79.642 BILATERAL HAND PAIN: ICD-10-CM

## 2019-10-07 DIAGNOSIS — M79.641 BILATERAL HAND PAIN: ICD-10-CM

## 2019-10-07 PROCEDURE — 95911 PR NERVE CONDUCTION STUDY; 9-10 STUDIES: ICD-10-PCS | Mod: S$GLB,,, | Performed by: PHYSICAL MEDICINE & REHABILITATION

## 2019-10-07 PROCEDURE — 95886 MUSC TEST DONE W/N TEST COMP: CPT | Mod: S$GLB,,, | Performed by: PHYSICAL MEDICINE & REHABILITATION

## 2019-10-07 PROCEDURE — 95886 PR EMG COMPLETE, W/ NERVE CONDUCTION STUDIES, 5+ MUSCLES: ICD-10-PCS | Mod: S$GLB,,, | Performed by: PHYSICAL MEDICINE & REHABILITATION

## 2019-10-07 PROCEDURE — 95911 NRV CNDJ TEST 9-10 STUDIES: CPT | Mod: S$GLB,,, | Performed by: PHYSICAL MEDICINE & REHABILITATION

## 2019-10-07 NOTE — PROCEDURES
Test Date:  10/7/2019    Patient: Karen Duke : 1945 Physician: Johan Das D.O.   ID#:  SEX: Male Ref. Phys: Mirza Correia MD     HPI:  Karen Duke is a 74 y.o.female who presents for NCS/EMG of the bilateral upper extremities and left lower extremity to evaluate for CTS and peripheral polyneuropathy.  She declined the left lower extremity NCS/EMG as she states that her feet do not bother her much anymore.      NCV & EMG Findings:   The left median motor nerve showed prolonged latency and decreased CV across the wrist.     The left median sensory response showed no response.     The right median sensory response showed prolonged latency at the wrist    All remaining nerve conduction studies (as indicated in the following tables) were within normal limits.   Needle evaluation of the left Abductor Pollicis Brevis muscle showed increased motor unit duration.   All remaining muscles (as indicated in the following table) showed no evidence of electrical instability.    Impression:  1. There is evidence of a bilateral median mononeuropathy across the wrists (i.e. carpal tunnel syndrome).  This is sensorimotor on the left and sensory only on the right.  There is no motor axonal loss on either side.  There is no active denervation on either side.  This is graded as moderate on the left and mild on the right.    2. There is no evidence of a large fiber peripheral polyneuropathy affecting the bilateral upper extremities.  There is no evidence of a cervical radiculopathy or brachial plexopathy affecting the bilateral upper extremities  3. Ms. Verdin declined the NCS/EMG of the LLE as she states her feet do not bother much anymore.  This can always be done in the future if she would like.      ___________________________  Johan Das D.O.        Nerve Conduction Studies  Anti Sensory Summary Table     Stim Site NR Peak (ms) Norm Peak (ms) P-T Amp (µV) Norm P-T Amp Site1 Site2 Delta-P  (ms) Dist (cm) Ellis (m/s) Norm Ellis (m/s)   Left Radial Anti Sensory (Base 1st Digit)   Wrist    2.0 <3.1 19.3  Wrist Base 1st Digit 2.0 0.0     Right Radial Anti Sensory (Base 1st Digit)   Wrist    1.9 <3.1 44.5  Wrist Base 1st Digit 1.9 0.0       NCS+  Motor Nerve Results      Latency Amplitude F-Lat Segment Distance CV Comment   Site (ms) Norm (mV) Norm (ms)  (cm) (m/s) Norm    Left Median (APB) Motor   Palm 2.1 - 5.9 -         Wrist *5.9  < 4.7 5.5  > 3.8  Wrist-Palm 7 18 -    Elbow 10.5 - 5.2 -  Elbow-Wrist 23 50  > 47    Right Median (APB) Motor   Wrist 4.1  < 4.7 6.2  > 3.8         Elbow 8.0 - 5.9 -  Elbow-Wrist 23 59  > 47    Left Ulnar (ADM) Motor   Wrist 2.6  < 3.7 8.0  > 5.0         Bel Elbow 5.9 - 7.9 -  Bel Elbow-Wrist 26 79  > 52    Right Ulnar (ADM) Motor   Wrist 3.0  < 3.7 *4.5  > 5.0         Bel Elbow 6.0 - 3.4 -  Bel Elbow-Wrist 25 83  > 52      Sensory Nerve Results      Latency (Peak) Amplitude (P-P) Segment Distance CV Comment   Site (ms) Norm (µV) Norm  (cm) (m/s) Norm    Left Median Sensory   Wrist-Dig II *NR  < 4.0 *NR  > 8 Wrist-Dig II 14 *NR  > 39    Right Median Sensory   Wrist-Dig II *4.4  < 4.0 38  > 8 Wrist-Dig II 14 *32  > 39    Left Ulnar Sensory   Wrist-Dig V 2.8  < 4.0 44  > 4 Wrist-Dig V 14 50  > 38    Right Ulnar Sensory   Wrist-Dig V 3.0  < 4.0 39  > 4 Wrist-Dig V 14 47  > 38      EMG+     Side Muscle Nerve Root Ins Act Fibs Psw Amp Dur Poly Recrt Int Pat Comment   Right Biceps Musculocut C5-C6 Nml Nml Nml Nml Nml 0 Nml Nml    Right Triceps Radial C6-C8 Nml Nml Nml Nml Nml 0 Nml Nml    Right Brachiorad Radial C5-C6 Nml Nml Nml Nml Nml 0 Nml Nml    Right Pronator Teres Median C6-C7 Nml Nml Nml Nml Nml 0 Nml Nml    Right FDI Ulnar C8-T1 Nml Nml Nml Nml Nml 0 Nml Nml    Right APB Median C8-T1 Nml Nml Nml Nml Nml 0 Nml Nml    Left Biceps Musculocut C5-C6 Nml Nml Nml Nml Nml 0 Nml Nml    Left Triceps Radial C6-C8 Nml Nml Nml Nml Nml 0 Nml Nml    Left Pronator Teres Median C6-C7 Nml Nml  Nml Nml Nml 0 Nml Nml    Left FDI Ulnar C8-T1 Nml Nml Nml Nml Nml 0 Nml Nml    Left APB Median C8-T1 Nml Nml Nml Nml *>12ms 0 Nml Nml        Nerve Conduction Studies  Anti Sensory Left/Right Comparison     Stim Site L Lat (ms) R Lat (ms) L-R Lat (ms) L Amp (µV) R Amp (µV) L-R Amp (%) Site1 Site2 L Ellis (m/s) R Ellis (m/s) L-R Ellis (m/s)   Radial Anti Sensory (Base 1st Digit)   Wrist 2.0 1.9 0.1 19.3 44.5 56.6 Wrist Base 1st Digit            Waveforms:       Motor                Sensory

## 2019-10-07 NOTE — TELEPHONE ENCOUNTER
I called patient to schedule a F/U appt with Dr. Correia to review EMG results. Appt scheduled for 10/08/2019 at 2:40

## 2019-10-08 ENCOUNTER — OFFICE VISIT (OUTPATIENT)
Dept: NEUROLOGY | Facility: CLINIC | Age: 74
End: 2019-10-08
Payer: MEDICARE

## 2019-10-08 VITALS
WEIGHT: 272.06 LBS | HEART RATE: 106 BPM | HEIGHT: 61 IN | SYSTOLIC BLOOD PRESSURE: 118 MMHG | BODY MASS INDEX: 51.36 KG/M2 | DIASTOLIC BLOOD PRESSURE: 74 MMHG

## 2019-10-08 DIAGNOSIS — G56.03 BILATERAL CARPAL TUNNEL SYNDROME: ICD-10-CM

## 2019-10-08 DIAGNOSIS — E53.8 LOW SERUM VITAMIN B12: Primary | ICD-10-CM

## 2019-10-08 DIAGNOSIS — G60.3 IDIOPATHIC PROGRESSIVE NEUROPATHY: ICD-10-CM

## 2019-10-08 PROCEDURE — 3074F SYST BP LT 130 MM HG: CPT | Mod: CPTII,S$GLB,, | Performed by: PSYCHIATRY & NEUROLOGY

## 2019-10-08 PROCEDURE — 3078F DIAST BP <80 MM HG: CPT | Mod: CPTII,S$GLB,, | Performed by: PSYCHIATRY & NEUROLOGY

## 2019-10-08 PROCEDURE — 3074F PR MOST RECENT SYSTOLIC BLOOD PRESSURE < 130 MM HG: ICD-10-PCS | Mod: CPTII,S$GLB,, | Performed by: PSYCHIATRY & NEUROLOGY

## 2019-10-08 PROCEDURE — 99999 PR PBB SHADOW E&M-EST. PATIENT-LVL IV: ICD-10-PCS | Mod: PBBFAC,,, | Performed by: PSYCHIATRY & NEUROLOGY

## 2019-10-08 PROCEDURE — 3078F PR MOST RECENT DIASTOLIC BLOOD PRESSURE < 80 MM HG: ICD-10-PCS | Mod: CPTII,S$GLB,, | Performed by: PSYCHIATRY & NEUROLOGY

## 2019-10-08 PROCEDURE — 99999 PR PBB SHADOW E&M-EST. PATIENT-LVL IV: CPT | Mod: PBBFAC,,, | Performed by: PSYCHIATRY & NEUROLOGY

## 2019-10-08 PROCEDURE — 99213 PR OFFICE/OUTPT VISIT, EST, LEVL III, 20-29 MIN: ICD-10-PCS | Mod: S$GLB,,, | Performed by: PSYCHIATRY & NEUROLOGY

## 2019-10-08 PROCEDURE — 1101F PR PT FALLS ASSESS DOC 0-1 FALLS W/OUT INJ PAST YR: ICD-10-PCS | Mod: CPTII,S$GLB,, | Performed by: PSYCHIATRY & NEUROLOGY

## 2019-10-08 PROCEDURE — 99213 OFFICE O/P EST LOW 20 MIN: CPT | Mod: S$GLB,,, | Performed by: PSYCHIATRY & NEUROLOGY

## 2019-10-08 PROCEDURE — 1101F PT FALLS ASSESS-DOCD LE1/YR: CPT | Mod: CPTII,S$GLB,, | Performed by: PSYCHIATRY & NEUROLOGY

## 2019-10-14 NOTE — PROGRESS NOTES
Neurology Clinic Visit  Primary Care Provider: Sarina Macias MD   Referring Provider: No ref. provider found   Date of Visit: 10/8/2019     chief complaint:   Chief Complaint   Patient presents with    Follow-up     emg results       Interval history:  Patient is here for followup on EMG/NCS that was done on 10/7/2019. EMG showed bilateral median neuropathy.  Overall, she feels that her leg symptoms have improved.     EMG impression  Impression:  1. There is evidence of a bilateral median mononeuropathy across the wrists (i.e. carpal tunnel syndrome).  This is sensorimotor on the left and sensory only on the right.  There is no motor axonal loss on either side.  There is no active denervation on either side.  This is graded as moderate on the left and mild on the right.    2. There is no evidence of a large fiber peripheral polyneuropathy affecting the bilateral upper extremities.  There is no evidence of a cervical radiculopathy or brachial plexopathy affecting the bilateral upper extremities  3. Ms. Verdin declined the NCS/EMG of the LLE as she states her feet do not bother much anymore.  This can always be done in the future if she would like.      Internal hisotry 8/6/2019  She did not start gabapentin due to potential side effects. She is doing ok with using topical cream that she get from over the counter. She is waiting for EMG/NCS. B12 level 300.      History of Present Illness  Karen Duke is a 74 y.o. right handed female I have been asked to consult for evaluation bilateral hand paresthesia. She reports numbness in both hands for the past six months (left more than). She has history of breast cancer s/p resection and underwent chemotherapy and radiation. She completed chemotherapy and radiation about 1 year ago. She denies neck pain. She have similar paresthesia in her feet but left hand is much worse. She has had diabetes mellitus for 15-20 years that is managed by her PCP.  B12 low 300s six  months ago.  She has recently saw Dr. Devine who gave injection in left hand but there was no improvement in the pain.         Patient Active Problem List    Diagnosis Date Noted    Vitamin D deficiency 06/21/2019    Trigger ring finger of left hand 01/24/2019    Carpal tunnel syndrome of left wrist 01/24/2019    Aromatase inhibitor use 09/28/2018    Hx of breast cancer 09/15/2018    Trigger finger of right thumb 07/23/2018    Hypokalemia 11/29/2017    Essential hypertension 10/10/2017    HLD (hyperlipidemia) 10/10/2017    DM (diabetes mellitus) 10/10/2017    Obesity 10/10/2017    Cancer of left breast, stage 1, estrogen receptor positive 09/27/2017    Screening for colon cancer 08/03/2015     Past Medical History:   Diagnosis Date    Breast cancer 10/2017    right breast    Diabetes mellitus     Hyperlipidemia     Hypertension      Past Surgical History:   Procedure Laterality Date    BREAST BIOPSY Left 10/2017    BREAST LUMPECTOMY Left 10/2018    EYE SURGERY Right     cataract    Hammer toe Left      Family History   Problem Relation Age of Onset    Breast cancer Maternal Aunt     Lung cancer Maternal Grandmother     Breast cancer Maternal Cousin          Current Outpatient Medications   Medication Sig    acetaZOLAMIDE (DIAMOX) 250 MG tablet     anastrozole (ARIMIDEX) 1 mg Tab TAKE 1 TABLET BY MOUTH ONCE DAILY    aspirin (ECOTRIN) 81 MG EC tablet Take 81 mg by mouth once daily.    cholecalciferol, vitamin D3, 50,000 unit Tab Take 50,000 Units by mouth once a week.    diclofenac sodium (VOLTAREN) 1 % Gel Apply 2 g topically 3 (three) times daily.    FLUAD [id number], 65 YR UP,,PF, 45 mcg (15 mcg x 3)/0.5 mL Syrg     ibuprofen (ADVIL,MOTRIN) 100 mg/5 mL suspension Take by mouth every 6 (six) hours as needed for Temperature greater than.    latanoprost 0.005 % ophthalmic solution     loratadine (CLARITIN) 5 mg chewable tablet Take 5 mg by mouth once daily.    metformin (GLUCOPHAGE-XR)  500 MG 24 hr tablet Take 500 mg by mouth daily with breakfast.    metOLazone (ZAROXOLYN) 5 MG tablet     neomycin-polymyxin-dexamethasone (DEXACINE) 3.5 mg/g-10,000 unit/g-0.1 % Oint     olmesartan-hydrochlorothiazide (BENICAR HCT) 40-25 mg per tablet     ondansetron (ZOFRAN) 4 MG tablet Take 1 tablet (4 mg total) by mouth every 6 (six) hours as needed for Nausea.    peg 3350-electrolytes-vit C (MOVIPREP) 100-7.5-2.691 gram PwPk Take as directed    potassium chloride SA (K-DUR,KLOR-CON) 20 MEQ tablet TAKE 1 TABLET BY MOUTH TWICE DAILY    pravastatin (PRAVACHOL) 40 MG tablet 40 mg 2 (two) times daily.     pravastatin (PRAVACHOL) 80 MG tablet     traMADol (ULTRAM) 50 mg tablet      No current facility-administered medications for this visit.      Review of patient's allergies indicates:  No Known Allergies  Social History     Socioeconomic History    Marital status: Single     Spouse name: Not on file    Number of children: Not on file    Years of education: Not on file    Highest education level: Not on file   Occupational History    Not on file   Social Needs    Financial resource strain: Not on file    Food insecurity:     Worry: Not on file     Inability: Not on file    Transportation needs:     Medical: Not on file     Non-medical: Not on file   Tobacco Use    Smoking status: Former Smoker     Packs/day: 0.90     Years: 5.00     Pack years: 4.50     Types: Cigarettes    Smokeless tobacco: Former User   Substance and Sexual Activity    Alcohol use: Yes     Alcohol/week: 1.0 standard drinks     Types: 1 Shots of liquor per week    Drug use: No    Sexual activity: Not on file   Lifestyle    Physical activity:     Days per week: Not on file     Minutes per session: Not on file    Stress: Not on file   Relationships    Social connections:     Talks on phone: Not on file     Gets together: Not on file     Attends Buddhism service: Not on file     Active member of club or organization: Not on  "file     Attends meetings of clubs or organizations: Not on file     Relationship status: Not on file   Other Topics Concern    Not on file   Social History Narrative    Not on file       Review of Systems      Constitutional: negative  Eyes: negative  Ears, nose, mouth, throat, and face: negative  Respiratory: negative  Cardiovascular: negative  Gastrointestinal: negative  Genitourinary:negative  Integument/breast: negative  Hematologic/lymphatic: negative  Musculoskeletal:negative  Neurological: negative  Behavioral/Psych: negative  Endocrine: negative  Allergic/Immunologic: negative    Objective:  Vital signs in last 24 hours:    Vitals:    10/08/19 1452   BP: 118/74   Pulse: 106   Weight: 123.4 kg (272 lb 0.8 oz)   Height: 5' 1" (1.549 m)       Body mass index is 51.4 kg/m².         Body mass index is 52.15 kg/m².   General: no acute distress, well nourished, well-groomed  CVS: RRR, no murmur, gallops or rubs  Respiratory: Clear to ausculation  Extremities: no edema     Neurological Examination:     HIGHER INTEGRATIVE FUNCTIONS:  -Normal attention span and concentration; immediately responds to questions and commands  -Oriented to time, place and person  -Recent and remote memory intact  -Language normal (no aphasia or dysarthria)  -Normal fund of knowledge     CN:  -PERRLA, visual fields full, unable to visualize optic discs due to small pupils on fundus exam   -EOMI with normal saccades and smooth pursuit  -Facial sensation intact bilaterally  -Facial strength/movement intact bilaterally  -Hearing intact to voice  -Palate elevates symmetrically  -Normal shoulder shrug and head turn  -Tongue protrudes midline     MOTOR: (left/right graded 1-5)  -UE: 5/5 deltoids; 5/5 biceps, triceps; 5/5 wrist flexors, extensors; 5/5 interosseous; 5/5   -LEs: 5/5 hip flexion, extension; 5/5 knee flexion, extension; 5/5 ankle flexion, extension  -Tone: normal  -No pronator drift, no orbiting     SENSORY:  -Light touch, " temperature sensation intact bilaterally; decrease vibration in feet distally     REFLEXES:  -2+ upper and lower bilaterally  -Flexor plantar reflex bilaterally  -No clonus     COORDINATION:  -FNF, HKS, MARIAMA intact bilaterally     GAIT:  -Normal casual gait            Assessment/Plan:     1. Bilateral carpal tunnel  2. Peripheral neuropathy  3. Low serum b12  4. History of breast cancer, per oncology  5. DMII, per PCP  6. Obesity     Plan:  Followup with hand surgery  Wrist splint at night.  She did not want to try an oral medication at this time  Followup with PCP for diabetes management.  Recommend healthy diet and exercise.     I discussed assessment and plan with patient and answered the questions that she had.      Part of patient note might have been created using Dragon Dictation.  Any errors in syntax or even information may not have been identified and edited on initial review prior to signing this note.

## 2019-12-12 ENCOUNTER — TELEPHONE (OUTPATIENT)
Dept: HEMATOLOGY/ONCOLOGY | Facility: CLINIC | Age: 74
End: 2019-12-12

## 2019-12-12 NOTE — TELEPHONE ENCOUNTER
Informed pt of need to reschedule 1/3/2020 appt. Rescheduled for 1/6/2020. Pt confirmed. Reminder slips placed in the mail.

## 2020-01-02 ENCOUNTER — HOSPITAL ENCOUNTER (OUTPATIENT)
Dept: RADIOLOGY | Facility: HOSPITAL | Age: 75
Discharge: HOME | End: 2020-01-02
Attending: INTERNAL MEDICINE
Payer: MEDICARE

## 2020-01-02 DIAGNOSIS — C50.912 CANCER OF LEFT BREAST, STAGE 1, ESTROGEN RECEPTOR POSITIVE: ICD-10-CM

## 2020-01-02 DIAGNOSIS — Z17.0 CANCER OF LEFT BREAST, STAGE 1, ESTROGEN RECEPTOR POSITIVE: ICD-10-CM

## 2020-01-02 PROCEDURE — 77062 BREAST TOMOSYNTHESIS BI: CPT | Mod: TC

## 2020-01-02 PROCEDURE — 77062 BREAST TOMOSYNTHESIS BI: CPT | Mod: 26,,, | Performed by: RADIOLOGY

## 2020-01-02 PROCEDURE — 77066 DX MAMMO INCL CAD BI: CPT | Mod: 26,,, | Performed by: RADIOLOGY

## 2020-01-02 PROCEDURE — 77066 MAMMO DIGITAL DIAGNOSTIC BILAT WITH TOMOSYNTHESIS_CAD: ICD-10-PCS | Mod: 26,,, | Performed by: RADIOLOGY

## 2020-01-02 PROCEDURE — 77062 MAMMO DIGITAL DIAGNOSTIC BILAT WITH TOMOSYNTHESIS_CAD: ICD-10-PCS | Mod: 26,,, | Performed by: RADIOLOGY

## 2020-01-06 ENCOUNTER — OFFICE VISIT (OUTPATIENT)
Dept: HEMATOLOGY/ONCOLOGY | Facility: CLINIC | Age: 75
End: 2020-01-06
Payer: MEDICARE

## 2020-01-06 VITALS
BODY MASS INDEX: 50.99 KG/M2 | RESPIRATION RATE: 18 BRPM | TEMPERATURE: 98 F | SYSTOLIC BLOOD PRESSURE: 119 MMHG | WEIGHT: 269.81 LBS | OXYGEN SATURATION: 98 % | HEART RATE: 82 BPM | DIASTOLIC BLOOD PRESSURE: 71 MMHG

## 2020-01-06 DIAGNOSIS — Z17.0 CANCER OF LEFT BREAST, STAGE 1, ESTROGEN RECEPTOR POSITIVE: Primary | ICD-10-CM

## 2020-01-06 DIAGNOSIS — E53.8 B12 DEFICIENCY: ICD-10-CM

## 2020-01-06 DIAGNOSIS — E55.9 VITAMIN D DEFICIENCY: ICD-10-CM

## 2020-01-06 DIAGNOSIS — Z79.811 AROMATASE INHIBITOR USE: ICD-10-CM

## 2020-01-06 DIAGNOSIS — C50.912 CANCER OF LEFT BREAST, STAGE 1, ESTROGEN RECEPTOR POSITIVE: Primary | ICD-10-CM

## 2020-01-06 PROCEDURE — 99999 PR PBB SHADOW E&M-EST. PATIENT-LVL III: CPT | Mod: PBBFAC,,, | Performed by: INTERNAL MEDICINE

## 2020-01-06 PROCEDURE — 99214 OFFICE O/P EST MOD 30 MIN: CPT | Mod: S$GLB,,, | Performed by: INTERNAL MEDICINE

## 2020-01-06 PROCEDURE — 3078F PR MOST RECENT DIASTOLIC BLOOD PRESSURE < 80 MM HG: ICD-10-PCS | Mod: CPTII,S$GLB,, | Performed by: INTERNAL MEDICINE

## 2020-01-06 PROCEDURE — 1101F PR PT FALLS ASSESS DOC 0-1 FALLS W/OUT INJ PAST YR: ICD-10-PCS | Mod: CPTII,S$GLB,, | Performed by: INTERNAL MEDICINE

## 2020-01-06 PROCEDURE — 1125F AMNT PAIN NOTED PAIN PRSNT: CPT | Mod: S$GLB,,, | Performed by: INTERNAL MEDICINE

## 2020-01-06 PROCEDURE — 1101F PT FALLS ASSESS-DOCD LE1/YR: CPT | Mod: CPTII,S$GLB,, | Performed by: INTERNAL MEDICINE

## 2020-01-06 PROCEDURE — 99214 PR OFFICE/OUTPT VISIT, EST, LEVL IV, 30-39 MIN: ICD-10-PCS | Mod: S$GLB,,, | Performed by: INTERNAL MEDICINE

## 2020-01-06 PROCEDURE — 1159F PR MEDICATION LIST DOCUMENTED IN MEDICAL RECORD: ICD-10-PCS | Mod: S$GLB,,, | Performed by: INTERNAL MEDICINE

## 2020-01-06 PROCEDURE — 3074F PR MOST RECENT SYSTOLIC BLOOD PRESSURE < 130 MM HG: ICD-10-PCS | Mod: CPTII,S$GLB,, | Performed by: INTERNAL MEDICINE

## 2020-01-06 PROCEDURE — 1125F PR PAIN SEVERITY QUANTIFIED, PAIN PRESENT: ICD-10-PCS | Mod: S$GLB,,, | Performed by: INTERNAL MEDICINE

## 2020-01-06 PROCEDURE — 99999 PR PBB SHADOW E&M-EST. PATIENT-LVL III: ICD-10-PCS | Mod: PBBFAC,,, | Performed by: INTERNAL MEDICINE

## 2020-01-06 PROCEDURE — 1159F MED LIST DOCD IN RCRD: CPT | Mod: S$GLB,,, | Performed by: INTERNAL MEDICINE

## 2020-01-06 PROCEDURE — 3078F DIAST BP <80 MM HG: CPT | Mod: CPTII,S$GLB,, | Performed by: INTERNAL MEDICINE

## 2020-01-06 PROCEDURE — 3074F SYST BP LT 130 MM HG: CPT | Mod: CPTII,S$GLB,, | Performed by: INTERNAL MEDICINE

## 2020-01-06 NOTE — PROGRESS NOTES
PATIENT: Karen Duke  MRN: [id number]  DATE: 1/6/2020    Diagnosis:   1. Cancer of left breast, stage 1, estrogen receptor positive    2. Aromatase inhibitor use    3. Vitamin D deficiency    4. B12 deficiency    5. Vitamin D deficiency       Chief Complaint: Breast Cancer    Subjective:     Pertinent Medical History:     PCP Dr. Sarina Macias.     She was diagnosed with left breast cancer on routine screening mammogram - in September 2017. A 1.1 centimeter lobulated mass was noticed in the 8:00 position in the left breast.  Images were done at [facility].  Biopsy was done on 9/22/17.  It was high-grade invasive ductal carcinoma - ER +96%, NC +94% and HER-2/bharti IHC 3+.  Ki-67 was 37%.      She has h/o sickle cell trait with no manifestation.  No history of blood transfusion.     Underwent left breast lumpectomy on 10/12/17. Lumpectomy specimen showed 2 foci of high grade (grade 3) invasive carcinoma - measuring 10 millimeters and 13 millimeters. Margins negative.  1 sentinel lymph node removed and it was negative.     Received 12 doses of weekly Taxol/Herceptin from Nov 2017 until Feb 2018. 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 Then completed adjuvant RT to left breast on 5/10/18.     Restarted adjuvant Herceptin 6/15/18. She got 3 doses.  She then started to have lots of chest pains and decided to discontinue the Herceptin infusions.  Last dose July 2018     Started Arimidex August 2018 and has been tolerating that well without any problems and issues.    Interval History:   -she is here for follow-up of carcinoma of the left breast  -she is on Arimidex since August 2018 without any problems and is tolerating it well  -she complains of worsening pain and numbness in the left hand and wrist  -she saw Dr. Devine before and further evaluation with nerve conduction studies was recommended  -complains of generalized tiredness and fatigue    Past Medical, Surgical, Family, and Social histories reviewed.    Medication and Allergies  reviewed.    Review of Systems  CONSTITUTIONAL: Weight stable. Appetite good. No fevers.  RESPIRATORY: No cough or congestion.  CARDIOVASCULAR: No chest pain or difficulty breathing.  GASTROINTESTINAL: No abdominal pain or nausea. No change in bowel habits.    Objective:     Vitals:    01/06/20 1315   BP: 119/71   Pulse: 82   Resp: 18   Temp: 98 °F (36.7 °C)   SpO2: 98%   Weight: 122.4 kg (269 lb 13.5 oz)     BMI: Body mass index is 50.99 kg/m².    Physical Exam  General appearance: no acute distress. conversant  Ear, Nose, Mouth, Throat: oropharynx clear. mucous membranes moist. no oral ulcers. no gum bleeding.  Neck: no masses or enlarged lymph nodes  Lungs: clear to auscultation. no rales. breathing nonlabored  Heart: rhythm regular. no gallops. no edema.  Abdomen: soft and nontender. no ascites. no hepatosplenomegaly.  Neurologic/Psychiatric: alert. oriented to time, place and person. no anxiety or agitation.  Breast exam-no palpable masses on the left breast.  No axillary lymphadenopathy    Assessment:       1. Cancer of left breast, stage 1, estrogen receptor positive    2. Aromatase inhibitor use    3. Vitamin D deficiency    4. B12 deficiency    5. Vitamin D deficiency       Plan:   Cancer of left breast, stage 1, estrogen receptor positive  -she has stage I triple positive high-grade left breast invasive carcinoma status post lumpectomy October 2017  -completed adjuvant Herceptin based chemotherapy in July 2018  -completed radiation therapy to the left breast May 2018  -started Arimidex August 2018  -doing well from breast cancer standpoint  -needs repeat MMG in jan 2021  -DEXA Dec 2018 normal     Aromatase inhibitor use  -started August 2018  -plan 5 years of therapy  -tolerating it well     Vitamin D deficiency  -improved with Vit D supplementation  -decrease Vit D to 2000 IU QD  -will repeat vitamin-D level in 6 months     Neuropathy and Carpal tunnel syndrome  -follows with neurology    B12 def  -on B12  supplementation, increase dose to 5000 mcg QD  -check level in 6 mo    Anemia of chronic disease  -Hb 10.8  -stable  -will monitor

## 2020-06-16 ENCOUNTER — LAB VISIT (OUTPATIENT)
Dept: PRIMARY CARE CLINIC | Facility: OTHER | Age: 75
End: 2020-06-16
Payer: MEDICARE

## 2020-06-16 DIAGNOSIS — Z03.818 ENCOUNTER FOR OBSERVATION FOR SUSPECTED EXPOSURE TO OTHER BIOLOGICAL AGENTS RULED OUT: ICD-10-CM

## 2020-06-16 PROCEDURE — U0003 INFECTIOUS AGENT DETECTION BY NUCLEIC ACID (DNA OR RNA); SEVERE ACUTE RESPIRATORY SYNDROME CORONAVIRUS 2 (SARS-COV-2) (CORONAVIRUS DISEASE [COVID-19]), AMPLIFIED PROBE TECHNIQUE, MAKING USE OF HIGH THROUGHPUT TECHNOLOGIES AS DESCRIBED BY CMS-2020-01-R: HCPCS

## 2020-06-19 LAB — SARS-COV-2 RNA RESP QL NAA+PROBE: NOT DETECTED

## 2020-06-19 NOTE — PROGRESS NOTES
06/19/2020 4:14 PM    Attempted to contact patient to inform of Negative COVID-19 testing. CLEMENCIA.     Tianna Zavaleta, MPH, PA-C  [facility]  [phone]

## 2020-06-19 NOTE — PROGRESS NOTES
06/19/2020 4:23 PM    This result was communicated to the patient by Tianna Zavaleta PA-C on 06/19/2020.  Pt was symptomatic in March but not lately.      Reminded patient to continue to follow guidelines from CDC and [facility].       Letter sent to home address.     Tianna Zavaleta, MPH, PA-C  [facility]  [phone]

## 2020-07-09 ENCOUNTER — TELEPHONE (OUTPATIENT)
Dept: HEMATOLOGY/ONCOLOGY | Facility: CLINIC | Age: 75
End: 2020-07-09

## 2020-07-09 DIAGNOSIS — Z01.84 ENCOUNTER FOR ANTIBODY RESPONSE EXAMINATION: ICD-10-CM

## 2020-07-09 NOTE — TELEPHONE ENCOUNTER
APPT SCHEDULED   Antibody test ordered     ----- Message from Sharron Kelley sent at 7/9/2020 11:30 AM CDT -----  Contact: [phone]/Self  Patient is requesting to speak with nurse to ask if Dr. Wilkins can put in orders for antibody test. Please advise.

## 2020-07-10 ENCOUNTER — LAB VISIT (OUTPATIENT)
Dept: LAB | Facility: HOSPITAL | Age: 75
End: 2020-07-10
Attending: INTERNAL MEDICINE
Payer: MEDICARE

## 2020-07-10 DIAGNOSIS — Z01.84 ENCOUNTER FOR ANTIBODY RESPONSE EXAMINATION: ICD-10-CM

## 2020-07-10 DIAGNOSIS — Z17.0 CANCER OF LEFT BREAST, STAGE 1, ESTROGEN RECEPTOR POSITIVE: ICD-10-CM

## 2020-07-10 DIAGNOSIS — E55.9 VITAMIN D DEFICIENCY: ICD-10-CM

## 2020-07-10 DIAGNOSIS — C50.912 CANCER OF LEFT BREAST, STAGE 1, ESTROGEN RECEPTOR POSITIVE: ICD-10-CM

## 2020-07-10 DIAGNOSIS — E53.8 B12 DEFICIENCY: ICD-10-CM

## 2020-07-10 LAB
25(OH)D3+25(OH)D2 SERPL-MCNC: 47 NG/ML (ref 30–96)
ALBUMIN SERPL BCP-MCNC: 3.8 G/DL (ref 3.5–5.2)
ALP SERPL-CCNC: 64 U/L (ref 55–135)
ALT SERPL W/O P-5'-P-CCNC: 12 U/L (ref 10–44)
ANION GAP SERPL CALC-SCNC: 11 MMOL/L (ref 8–16)
AST SERPL-CCNC: 15 U/L (ref 10–40)
BASOPHILS # BLD AUTO: 0.03 K/UL (ref 0–0.2)
BASOPHILS NFR BLD: 0.3 % (ref 0–1.9)
BILIRUB SERPL-MCNC: 0.2 MG/DL (ref 0.1–1)
BUN SERPL-MCNC: 15 MG/DL (ref 8–23)
CALCIUM SERPL-MCNC: 10.1 MG/DL (ref 8.7–10.5)
CHLORIDE SERPL-SCNC: 105 MMOL/L (ref 95–110)
CO2 SERPL-SCNC: 25 MMOL/L (ref 23–29)
CREAT SERPL-MCNC: 0.9 MG/DL (ref 0.5–1.4)
DIFFERENTIAL METHOD: ABNORMAL
EOSINOPHIL # BLD AUTO: 0.1 K/UL (ref 0–0.5)
EOSINOPHIL NFR BLD: 1.2 % (ref 0–8)
ERYTHROCYTE [DISTWIDTH] IN BLOOD BY AUTOMATED COUNT: 16.3 % (ref 11.5–14.5)
EST. GFR  (AFRICAN AMERICAN): >60 ML/MIN/1.73 M^2
EST. GFR  (NON AFRICAN AMERICAN): >60 ML/MIN/1.73 M^2
GLUCOSE SERPL-MCNC: 126 MG/DL (ref 70–110)
HCT VFR BLD AUTO: 37.2 % (ref 37–48.5)
HGB BLD-MCNC: 11.5 G/DL (ref 12–16)
IMM GRANULOCYTES # BLD AUTO: 0.03 K/UL (ref 0–0.04)
IMM GRANULOCYTES NFR BLD AUTO: 0.3 % (ref 0–0.5)
LYMPHOCYTES # BLD AUTO: 4.3 K/UL (ref 1–4.8)
LYMPHOCYTES NFR BLD: 47.5 % (ref 18–48)
MCH RBC QN AUTO: 23.8 PG (ref 27–31)
MCHC RBC AUTO-ENTMCNC: 30.9 G/DL (ref 32–36)
MCV RBC AUTO: 77 FL (ref 82–98)
MONOCYTES # BLD AUTO: 0.5 K/UL (ref 0.3–1)
MONOCYTES NFR BLD: 5.6 % (ref 4–15)
NEUTROPHILS # BLD AUTO: 4.1 K/UL (ref 1.8–7.7)
NEUTROPHILS NFR BLD: 45.1 % (ref 38–73)
NRBC BLD-RTO: 0 /100 WBC
PLATELET # BLD AUTO: 334 K/UL (ref 150–350)
PMV BLD AUTO: 9.5 FL (ref 9.2–12.9)
POTASSIUM SERPL-SCNC: 4 MMOL/L (ref 3.5–5.1)
PROT SERPL-MCNC: 7.9 G/DL (ref 6–8.4)
RBC # BLD AUTO: 4.83 M/UL (ref 4–5.4)
SARS-COV-2 IGG SERPLBLD QL IA.RAPID: NEGATIVE
SODIUM SERPL-SCNC: 141 MMOL/L (ref 136–145)
VIT B12 SERPL-MCNC: 753 PG/ML (ref 210–950)
WBC # BLD AUTO: 9.05 K/UL (ref 3.9–12.7)

## 2020-07-10 PROCEDURE — 80053 COMPREHEN METABOLIC PANEL: CPT

## 2020-07-10 PROCEDURE — 82306 VITAMIN D 25 HYDROXY: CPT

## 2020-07-10 PROCEDURE — 86769 SARS-COV-2 COVID-19 ANTIBODY: CPT

## 2020-07-10 PROCEDURE — 36415 COLL VENOUS BLD VENIPUNCTURE: CPT

## 2020-07-10 PROCEDURE — 85025 COMPLETE CBC W/AUTO DIFF WBC: CPT

## 2020-07-10 PROCEDURE — 82607 VITAMIN B-12: CPT

## 2020-07-13 ENCOUNTER — OFFICE VISIT (OUTPATIENT)
Dept: HEMATOLOGY/ONCOLOGY | Facility: CLINIC | Age: 75
End: 2020-07-13
Payer: MEDICARE

## 2020-07-13 VITALS
TEMPERATURE: 98 F | HEART RATE: 107 BPM | RESPIRATION RATE: 18 BRPM | DIASTOLIC BLOOD PRESSURE: 78 MMHG | SYSTOLIC BLOOD PRESSURE: 126 MMHG | OXYGEN SATURATION: 95 % | WEIGHT: 267.19 LBS | BODY MASS INDEX: 50.49 KG/M2

## 2020-07-13 DIAGNOSIS — C50.912 CANCER OF LEFT BREAST, STAGE 1, ESTROGEN RECEPTOR POSITIVE: Primary | ICD-10-CM

## 2020-07-13 DIAGNOSIS — E55.9 VITAMIN D DEFICIENCY: ICD-10-CM

## 2020-07-13 DIAGNOSIS — Z17.0 CANCER OF LEFT BREAST, STAGE 1, ESTROGEN RECEPTOR POSITIVE: Primary | ICD-10-CM

## 2020-07-13 DIAGNOSIS — D53.9 NUTRITIONAL ANEMIA, UNSPECIFIED: ICD-10-CM

## 2020-07-13 DIAGNOSIS — Z12.31 ENCOUNTER FOR SCREENING MAMMOGRAM FOR MALIGNANT NEOPLASM OF BREAST: ICD-10-CM

## 2020-07-13 DIAGNOSIS — M81.0 AGE-RELATED OSTEOPOROSIS WITHOUT CURRENT PATHOLOGICAL FRACTURE: ICD-10-CM

## 2020-07-13 PROCEDURE — 99214 OFFICE O/P EST MOD 30 MIN: CPT | Mod: S$GLB,,, | Performed by: INTERNAL MEDICINE

## 2020-07-13 PROCEDURE — 1126F PR PAIN SEVERITY QUANTIFIED, NO PAIN PRESENT: ICD-10-PCS | Mod: S$GLB,,, | Performed by: INTERNAL MEDICINE

## 2020-07-13 PROCEDURE — 3078F PR MOST RECENT DIASTOLIC BLOOD PRESSURE < 80 MM HG: ICD-10-PCS | Mod: CPTII,S$GLB,, | Performed by: INTERNAL MEDICINE

## 2020-07-13 PROCEDURE — 3008F BODY MASS INDEX DOCD: CPT | Mod: CPTII,S$GLB,, | Performed by: INTERNAL MEDICINE

## 2020-07-13 PROCEDURE — 1126F AMNT PAIN NOTED NONE PRSNT: CPT | Mod: S$GLB,,, | Performed by: INTERNAL MEDICINE

## 2020-07-13 PROCEDURE — 99999 PR PBB SHADOW E&M-EST. PATIENT-LVL III: ICD-10-PCS | Mod: PBBFAC,,, | Performed by: INTERNAL MEDICINE

## 2020-07-13 PROCEDURE — 99214 PR OFFICE/OUTPT VISIT, EST, LEVL IV, 30-39 MIN: ICD-10-PCS | Mod: S$GLB,,, | Performed by: INTERNAL MEDICINE

## 2020-07-13 PROCEDURE — 1159F PR MEDICATION LIST DOCUMENTED IN MEDICAL RECORD: ICD-10-PCS | Mod: S$GLB,,, | Performed by: INTERNAL MEDICINE

## 2020-07-13 PROCEDURE — 3078F DIAST BP <80 MM HG: CPT | Mod: CPTII,S$GLB,, | Performed by: INTERNAL MEDICINE

## 2020-07-13 PROCEDURE — 99999 PR PBB SHADOW E&M-EST. PATIENT-LVL III: CPT | Mod: PBBFAC,,, | Performed by: INTERNAL MEDICINE

## 2020-07-13 PROCEDURE — 3074F SYST BP LT 130 MM HG: CPT | Mod: CPTII,S$GLB,, | Performed by: INTERNAL MEDICINE

## 2020-07-13 PROCEDURE — 3074F PR MOST RECENT SYSTOLIC BLOOD PRESSURE < 130 MM HG: ICD-10-PCS | Mod: CPTII,S$GLB,, | Performed by: INTERNAL MEDICINE

## 2020-07-13 PROCEDURE — 1159F MED LIST DOCD IN RCRD: CPT | Mod: S$GLB,,, | Performed by: INTERNAL MEDICINE

## 2020-07-13 PROCEDURE — 3008F PR BODY MASS INDEX (BMI) DOCUMENTED: ICD-10-PCS | Mod: CPTII,S$GLB,, | Performed by: INTERNAL MEDICINE

## 2020-07-13 NOTE — PROGRESS NOTES
PATIENT: Karen Duke  MRN: [id number]  DATE: 7/13/2020    Diagnosis:   1. Cancer of left breast, stage 1, estrogen receptor positive    2. Vitamin D deficiency    3. Nutritional anemia, unspecified     4. Age-related osteoporosis without current pathological fracture     5. Encounter for screening mammogram for malignant neoplasm of breast       Chief Complaint: Breast Cancer    Subjective:     Pertinent Medical History:     PCP Dr. Sarina Macias.     She was diagnosed with left breast cancer on routine screening mammogram - in September 2017. A 1.1 centimeter lobulated mass was noticed in the 8:00 position in the left breast.  Images were done at [facility].  Biopsy was done on 9/22/17.  It was high-grade invasive ductal carcinoma - ER +96%, IA +94% and HER-2/bharti IHC 3+.  Ki-67 was 37%.      She has h/o sickle cell trait with no manifestation.  No history of blood transfusion.     Underwent left breast lumpectomy on 10/12/17. Lumpectomy specimen showed 2 foci of high grade (grade 3) invasive carcinoma - measuring 10 millimeters and 13 millimeters. Margins negative.  1 sentinel lymph node removed and it was negative.     Received 12 doses of weekly Taxol/Herceptin from Nov 2017 until Feb 2018. 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 Then completed adjuvant RT to left breast on 5/10/18.     Restarted adjuvant Herceptin 6/15/18. She got 3 doses.  She then started to have lots of chest pains and decided to discontinue the Herceptin infusions.  Last dose July 2018     Started Arimidex August 2018 and has been tolerating that well without any problems and issues.    Interval History:   -she is here for follow-up of carcinoma of the left breast  -she is on Arimidex since August 2018 without any problems and is tolerating it well  -she complains of worsening pain and numbness in the left hand and wrist  -she saw Dr. Devine before and further evaluation with nerve conduction studies was recommended  -complains of generalized tiredness  and fatigue    Past Medical, Surgical, Family, and Social histories reviewed.    Medication and Allergies reviewed.    Review of Systems  CONSTITUTIONAL: Weight stable. Appetite good. No fevers.  RESPIRATORY: No cough or congestion.  CARDIOVASCULAR: No chest pain or difficulty breathing.  GASTROINTESTINAL: No abdominal pain or nausea. No change in bowel habits.    Objective:     Vitals:    07/13/20 1121   BP: 126/78   Pulse: 107   Resp: 18   Temp: 98 °F (36.7 °C)   SpO2: 95%   Weight: 121.2 kg (267 lb 3.2 oz)     BMI: Body mass index is 50.49 kg/m².    Physical Exam  General appearance: no acute distress. conversant  Ear, Nose, Mouth, Throat: oropharynx clear. mucous membranes moist. no oral ulcers. no gum bleeding.  Neck: no masses or enlarged lymph nodes  Lungs: clear to auscultation. no rales. breathing nonlabored  Heart: rhythm regular. no gallops. no edema.  Abdomen: soft and nontender. no ascites. no hepatosplenomegaly.  Neurologic/Psychiatric: alert. oriented to time, place and person. no anxiety or agitation.  Breast exam-no palpable masses on the left breast.  No axillary lymphadenopathy    Assessment:       1. Cancer of left breast, stage 1, estrogen receptor positive    2. Vitamin D deficiency    3. Nutritional anemia, unspecified     4. Age-related osteoporosis without current pathological fracture     5. Encounter for screening mammogram for malignant neoplasm of breast       Plan:   Cancer of left breast, stage 1, estrogen receptor positive  -she has stage I triple positive high-grade left breast invasive carcinoma status post lumpectomy October 2017  -completed adjuvant Herceptin based chemotherapy in July 2018  -completed radiation therapy to the left breast May 2018  -started Arimidex August 2018  -doing well from breast cancer standpoint  -needs repeat MMG in jan 2021  -DEXA Dec 2018 normal, will repeat in 6 mo     Aromatase inhibitor use  -started August 2018  -plan 5 years of therapy  -tolerating  it well     Vitamin D deficiency  -improved with Vit D supplementation  -cont Vit D to 2000 IU QD  -will repeat vitamin-D level in 6 months     Neuropathy and Carpal tunnel syndrome  -follows with neurology    B12 def  -on B12 supplementation, 5000 mcg QD  -check level in 6 mo    Anemia of chronic disease  -Hb stable  -will monitor

## 2021-01-11 ENCOUNTER — TELEPHONE (OUTPATIENT)
Dept: HEMATOLOGY/ONCOLOGY | Facility: CLINIC | Age: 76
End: 2021-01-11

## 2021-01-11 DIAGNOSIS — Z01.84 ENCOUNTER FOR ANTIBODY RESPONSE EXAMINATION: ICD-10-CM

## 2021-01-11 DIAGNOSIS — Z17.0 CANCER OF LEFT BREAST, STAGE 1, ESTROGEN RECEPTOR POSITIVE: Primary | ICD-10-CM

## 2021-01-11 DIAGNOSIS — C50.912 CANCER OF LEFT BREAST, STAGE 1, ESTROGEN RECEPTOR POSITIVE: Primary | ICD-10-CM

## 2021-01-13 ENCOUNTER — HOSPITAL ENCOUNTER (OUTPATIENT)
Dept: RADIOLOGY | Facility: HOSPITAL | Age: 76
Discharge: HOME | End: 2021-01-13
Attending: INTERNAL MEDICINE
Payer: MEDICARE

## 2021-01-13 DIAGNOSIS — Z17.0 CANCER OF LEFT BREAST, STAGE 1, ESTROGEN RECEPTOR POSITIVE: ICD-10-CM

## 2021-01-13 DIAGNOSIS — D53.9 NUTRITIONAL ANEMIA, UNSPECIFIED: ICD-10-CM

## 2021-01-13 DIAGNOSIS — Z12.31 ENCOUNTER FOR SCREENING MAMMOGRAM FOR MALIGNANT NEOPLASM OF BREAST: ICD-10-CM

## 2021-01-13 DIAGNOSIS — C50.912 CANCER OF LEFT BREAST, STAGE 1, ESTROGEN RECEPTOR POSITIVE: ICD-10-CM

## 2021-01-13 DIAGNOSIS — M81.0 AGE-RELATED OSTEOPOROSIS WITHOUT CURRENT PATHOLOGICAL FRACTURE: ICD-10-CM

## 2021-01-13 PROCEDURE — 77067 SCR MAMMO BI INCL CAD: CPT | Mod: TC

## 2021-01-13 PROCEDURE — 77080 DXA BONE DENSITY AXIAL: CPT | Mod: TC

## 2021-01-13 PROCEDURE — 77080 DXA BONE DENSITY AXIAL: CPT | Mod: 26,,, | Performed by: RADIOLOGY

## 2021-01-13 PROCEDURE — 77067 MAMMO DIGITAL SCREENING BILAT WITH TOMOSYNTHESIS_CAD: ICD-10-PCS | Mod: 26,,, | Performed by: RADIOLOGY

## 2021-01-13 PROCEDURE — 77067 SCR MAMMO BI INCL CAD: CPT | Mod: 26,,, | Performed by: RADIOLOGY

## 2021-01-13 PROCEDURE — 77080 DEXA BONE DENSITY SPINE HIP: ICD-10-PCS | Mod: 26,,, | Performed by: RADIOLOGY

## 2021-01-13 PROCEDURE — 77063 BREAST TOMOSYNTHESIS BI: CPT | Mod: 26,,, | Performed by: RADIOLOGY

## 2021-01-13 PROCEDURE — 77063 MAMMO DIGITAL SCREENING BILAT WITH TOMOSYNTHESIS_CAD: ICD-10-PCS | Mod: 26,,, | Performed by: RADIOLOGY

## 2021-01-14 ENCOUNTER — OFFICE VISIT (OUTPATIENT)
Dept: HEMATOLOGY/ONCOLOGY | Facility: CLINIC | Age: 76
End: 2021-01-14
Payer: MEDICARE

## 2021-01-14 VITALS
OXYGEN SATURATION: 95 % | RESPIRATION RATE: 18 BRPM | BODY MASS INDEX: 50.9 KG/M2 | HEART RATE: 112 BPM | SYSTOLIC BLOOD PRESSURE: 152 MMHG | TEMPERATURE: 98 F | WEIGHT: 269.38 LBS | DIASTOLIC BLOOD PRESSURE: 87 MMHG

## 2021-01-14 DIAGNOSIS — D53.9 NUTRITIONAL ANEMIA, UNSPECIFIED: ICD-10-CM

## 2021-01-14 DIAGNOSIS — E55.9 VITAMIN D DEFICIENCY: ICD-10-CM

## 2021-01-14 DIAGNOSIS — M94.9 DISORDER OF CARTILAGE, UNSPECIFIED: ICD-10-CM

## 2021-01-14 DIAGNOSIS — Z79.811 AROMATASE INHIBITOR USE: ICD-10-CM

## 2021-01-14 DIAGNOSIS — C50.912 CANCER OF LEFT BREAST, STAGE 1, ESTROGEN RECEPTOR POSITIVE: Primary | ICD-10-CM

## 2021-01-14 DIAGNOSIS — Z17.0 CANCER OF LEFT BREAST, STAGE 1, ESTROGEN RECEPTOR POSITIVE: Primary | ICD-10-CM

## 2021-01-14 PROCEDURE — 99999 PR PBB SHADOW E&M-EST. PATIENT-LVL III: ICD-10-PCS | Mod: PBBFAC,,, | Performed by: INTERNAL MEDICINE

## 2021-01-14 PROCEDURE — 3008F BODY MASS INDEX DOCD: CPT | Mod: CPTII,S$GLB,, | Performed by: INTERNAL MEDICINE

## 2021-01-14 PROCEDURE — 1159F PR MEDICATION LIST DOCUMENTED IN MEDICAL RECORD: ICD-10-PCS | Mod: S$GLB,,, | Performed by: INTERNAL MEDICINE

## 2021-01-14 PROCEDURE — 99214 PR OFFICE/OUTPT VISIT, EST, LEVL IV, 30-39 MIN: ICD-10-PCS | Mod: S$GLB,,, | Performed by: INTERNAL MEDICINE

## 2021-01-14 PROCEDURE — 3008F PR BODY MASS INDEX (BMI) DOCUMENTED: ICD-10-PCS | Mod: CPTII,S$GLB,, | Performed by: INTERNAL MEDICINE

## 2021-01-14 PROCEDURE — 1126F PR PAIN SEVERITY QUANTIFIED, NO PAIN PRESENT: ICD-10-PCS | Mod: S$GLB,,, | Performed by: INTERNAL MEDICINE

## 2021-01-14 PROCEDURE — 99999 PR PBB SHADOW E&M-EST. PATIENT-LVL III: CPT | Mod: PBBFAC,,, | Performed by: INTERNAL MEDICINE

## 2021-01-14 PROCEDURE — 3079F DIAST BP 80-89 MM HG: CPT | Mod: CPTII,S$GLB,, | Performed by: INTERNAL MEDICINE

## 2021-01-14 PROCEDURE — 99214 OFFICE O/P EST MOD 30 MIN: CPT | Mod: S$GLB,,, | Performed by: INTERNAL MEDICINE

## 2021-01-14 PROCEDURE — 3079F PR MOST RECENT DIASTOLIC BLOOD PRESSURE 80-89 MM HG: ICD-10-PCS | Mod: CPTII,S$GLB,, | Performed by: INTERNAL MEDICINE

## 2021-01-14 PROCEDURE — 3077F PR MOST RECENT SYSTOLIC BLOOD PRESSURE >= 140 MM HG: ICD-10-PCS | Mod: CPTII,S$GLB,, | Performed by: INTERNAL MEDICINE

## 2021-01-14 PROCEDURE — 1159F MED LIST DOCD IN RCRD: CPT | Mod: S$GLB,,, | Performed by: INTERNAL MEDICINE

## 2021-01-14 PROCEDURE — 3077F SYST BP >= 140 MM HG: CPT | Mod: CPTII,S$GLB,, | Performed by: INTERNAL MEDICINE

## 2021-01-14 PROCEDURE — 1126F AMNT PAIN NOTED NONE PRSNT: CPT | Mod: S$GLB,,, | Performed by: INTERNAL MEDICINE

## 2021-01-17 ENCOUNTER — IMMUNIZATION (OUTPATIENT)
Dept: INTERNAL MEDICINE | Facility: CLINIC | Age: 76
End: 2021-01-17
Payer: MEDICARE

## 2021-01-17 DIAGNOSIS — Z23 NEED FOR VACCINATION: Primary | ICD-10-CM

## 2021-01-17 PROCEDURE — 91300 COVID-19, MRNA, LNP-S, PF, 30 MCG/0.3 ML DOSE VACCINE: CPT | Mod: PBBFAC | Performed by: FAMILY MEDICINE

## 2021-02-08 ENCOUNTER — IMMUNIZATION (OUTPATIENT)
Dept: INTERNAL MEDICINE | Facility: CLINIC | Age: 76
End: 2021-02-08
Payer: MEDICARE

## 2021-02-08 DIAGNOSIS — Z23 NEED FOR VACCINATION: Primary | ICD-10-CM

## 2021-02-08 PROCEDURE — 91300 COVID-19, MRNA, LNP-S, PF, 30 MCG/0.3 ML DOSE VACCINE: CPT | Mod: PBBFAC | Performed by: FAMILY MEDICINE

## 2021-02-08 PROCEDURE — 0002A COVID-19, MRNA, LNP-S, PF, 30 MCG/0.3 ML DOSE VACCINE: CPT | Mod: PBBFAC | Performed by: FAMILY MEDICINE

## 2021-05-03 DIAGNOSIS — E87.6 HYPOKALEMIA: ICD-10-CM

## 2021-05-03 RX ORDER — POTASSIUM CHLORIDE 20 MEQ/1
TABLET, EXTENDED RELEASE ORAL
Qty: 180 TABLET | Refills: 3 | Status: SHIPPED | OUTPATIENT
Start: 2021-05-03 | End: 2022-08-02 | Stop reason: SDUPTHER

## 2021-07-22 LAB
LEFT EYE DM RETINOPATHY: NEGATIVE
RIGHT EYE DM RETINOPATHY: NEGATIVE

## 2021-07-28 ENCOUNTER — LAB VISIT (OUTPATIENT)
Dept: LAB | Facility: HOSPITAL | Age: 76
End: 2021-07-28
Attending: INTERNAL MEDICINE
Payer: MEDICARE

## 2021-07-28 ENCOUNTER — PATIENT OUTREACH (OUTPATIENT)
Dept: ADMINISTRATIVE | Facility: HOSPITAL | Age: 76
End: 2021-07-28

## 2021-07-28 DIAGNOSIS — Z17.0 CANCER OF LEFT BREAST, STAGE 1, ESTROGEN RECEPTOR POSITIVE: ICD-10-CM

## 2021-07-28 DIAGNOSIS — M94.9 DISORDER OF CARTILAGE, UNSPECIFIED: ICD-10-CM

## 2021-07-28 DIAGNOSIS — C50.912 CANCER OF LEFT BREAST, STAGE 1, ESTROGEN RECEPTOR POSITIVE: ICD-10-CM

## 2021-07-28 DIAGNOSIS — D53.9 NUTRITIONAL ANEMIA, UNSPECIFIED: ICD-10-CM

## 2021-07-28 LAB
ALBUMIN SERPL BCP-MCNC: 3.7 G/DL (ref 3.5–5.2)
ALP SERPL-CCNC: 63 U/L (ref 55–135)
ALT SERPL W/O P-5'-P-CCNC: 14 U/L (ref 10–44)
ANION GAP SERPL CALC-SCNC: 9 MMOL/L (ref 8–16)
AST SERPL-CCNC: 15 U/L (ref 10–40)
BASOPHILS # BLD AUTO: 0.03 K/UL (ref 0–0.2)
BASOPHILS NFR BLD: 0.5 % (ref 0–1.9)
BILIRUB SERPL-MCNC: 0.2 MG/DL (ref 0.1–1)
BUN SERPL-MCNC: 12 MG/DL (ref 8–23)
CALCIUM SERPL-MCNC: 10 MG/DL (ref 8.7–10.5)
CHLORIDE SERPL-SCNC: 105 MMOL/L (ref 95–110)
CO2 SERPL-SCNC: 28 MMOL/L (ref 23–29)
CREAT SERPL-MCNC: 0.9 MG/DL (ref 0.5–1.4)
DIFFERENTIAL METHOD: ABNORMAL
EOSINOPHIL # BLD AUTO: 0.1 K/UL (ref 0–0.5)
EOSINOPHIL NFR BLD: 2.1 % (ref 0–8)
ERYTHROCYTE [DISTWIDTH] IN BLOOD BY AUTOMATED COUNT: 16.8 % (ref 11.5–14.5)
EST. GFR  (AFRICAN AMERICAN): >60 ML/MIN/1.73 M^2
EST. GFR  (NON AFRICAN AMERICAN): >60 ML/MIN/1.73 M^2
GLUCOSE SERPL-MCNC: 108 MG/DL (ref 70–110)
HCT VFR BLD AUTO: 35 % (ref 37–48.5)
HGB BLD-MCNC: 11.1 G/DL (ref 12–16)
IMM GRANULOCYTES # BLD AUTO: 0.02 K/UL (ref 0–0.04)
IMM GRANULOCYTES NFR BLD AUTO: 0.3 % (ref 0–0.5)
LYMPHOCYTES # BLD AUTO: 3 K/UL (ref 1–4.8)
LYMPHOCYTES NFR BLD: 45.6 % (ref 18–48)
MCH RBC QN AUTO: 24.3 PG (ref 27–31)
MCHC RBC AUTO-ENTMCNC: 31.7 G/DL (ref 32–36)
MCV RBC AUTO: 77 FL (ref 82–98)
MONOCYTES # BLD AUTO: 0.5 K/UL (ref 0.3–1)
MONOCYTES NFR BLD: 7.2 % (ref 4–15)
NEUTROPHILS # BLD AUTO: 2.9 K/UL (ref 1.8–7.7)
NEUTROPHILS NFR BLD: 44.3 % (ref 38–73)
NRBC BLD-RTO: 0 /100 WBC
PLATELET # BLD AUTO: 280 K/UL (ref 150–450)
PMV BLD AUTO: 9.5 FL (ref 9.2–12.9)
POTASSIUM SERPL-SCNC: 4 MMOL/L (ref 3.5–5.1)
PROT SERPL-MCNC: 7.5 G/DL (ref 6–8.4)
RBC # BLD AUTO: 4.56 M/UL (ref 4–5.4)
SODIUM SERPL-SCNC: 142 MMOL/L (ref 136–145)
WBC # BLD AUTO: 6.55 K/UL (ref 3.9–12.7)

## 2021-07-28 PROCEDURE — 36415 COLL VENOUS BLD VENIPUNCTURE: CPT | Performed by: INTERNAL MEDICINE

## 2021-07-28 PROCEDURE — 85025 COMPLETE CBC W/AUTO DIFF WBC: CPT | Performed by: INTERNAL MEDICINE

## 2021-07-28 PROCEDURE — 80053 COMPREHEN METABOLIC PANEL: CPT | Performed by: INTERNAL MEDICINE

## 2021-07-28 PROCEDURE — 82607 VITAMIN B-12: CPT | Performed by: INTERNAL MEDICINE

## 2021-07-28 PROCEDURE — 82306 VITAMIN D 25 HYDROXY: CPT | Performed by: INTERNAL MEDICINE

## 2021-07-29 ENCOUNTER — OFFICE VISIT (OUTPATIENT)
Dept: HEMATOLOGY/ONCOLOGY | Facility: CLINIC | Age: 76
End: 2021-07-29
Payer: MEDICARE

## 2021-07-29 VITALS
RESPIRATION RATE: 18 BRPM | WEIGHT: 268.94 LBS | DIASTOLIC BLOOD PRESSURE: 78 MMHG | BODY MASS INDEX: 50.78 KG/M2 | HEIGHT: 61 IN | HEART RATE: 87 BPM | SYSTOLIC BLOOD PRESSURE: 124 MMHG | OXYGEN SATURATION: 99 % | TEMPERATURE: 99 F

## 2021-07-29 DIAGNOSIS — D63.8 ANEMIA, CHRONIC DISEASE: ICD-10-CM

## 2021-07-29 DIAGNOSIS — Z79.811 AROMATASE INHIBITOR USE: ICD-10-CM

## 2021-07-29 DIAGNOSIS — E55.9 VITAMIN D DEFICIENCY: ICD-10-CM

## 2021-07-29 DIAGNOSIS — Z12.31 BREAST CANCER SCREENING BY MAMMOGRAM: ICD-10-CM

## 2021-07-29 DIAGNOSIS — E53.8 B12 DEFICIENCY: ICD-10-CM

## 2021-07-29 DIAGNOSIS — C50.912 CANCER OF LEFT BREAST, STAGE 1, ESTROGEN RECEPTOR POSITIVE: Primary | ICD-10-CM

## 2021-07-29 DIAGNOSIS — Z17.0 CANCER OF LEFT BREAST, STAGE 1, ESTROGEN RECEPTOR POSITIVE: Primary | ICD-10-CM

## 2021-07-29 LAB
25(OH)D3+25(OH)D2 SERPL-MCNC: 39 NG/ML (ref 30–96)
VIT B12 SERPL-MCNC: 426 PG/ML (ref 210–950)

## 2021-07-29 PROCEDURE — 3074F SYST BP LT 130 MM HG: CPT | Mod: CPTII,S$GLB,, | Performed by: INTERNAL MEDICINE

## 2021-07-29 PROCEDURE — 3288F PR FALLS RISK ASSESSMENT DOCUMENTED: ICD-10-PCS | Mod: CPTII,S$GLB,, | Performed by: INTERNAL MEDICINE

## 2021-07-29 PROCEDURE — 99214 PR OFFICE/OUTPT VISIT, EST, LEVL IV, 30-39 MIN: ICD-10-PCS | Mod: S$GLB,,, | Performed by: INTERNAL MEDICINE

## 2021-07-29 PROCEDURE — 3078F DIAST BP <80 MM HG: CPT | Mod: CPTII,S$GLB,, | Performed by: INTERNAL MEDICINE

## 2021-07-29 PROCEDURE — 99999 PR PBB SHADOW E&M-EST. PATIENT-LVL IV: ICD-10-PCS | Mod: PBBFAC,,, | Performed by: INTERNAL MEDICINE

## 2021-07-29 PROCEDURE — 99214 OFFICE O/P EST MOD 30 MIN: CPT | Mod: S$GLB,,, | Performed by: INTERNAL MEDICINE

## 2021-07-29 PROCEDURE — 1160F PR REVIEW ALL MEDS BY PRESCRIBER/CLIN PHARMACIST DOCUMENTED: ICD-10-PCS | Mod: CPTII,S$GLB,, | Performed by: INTERNAL MEDICINE

## 2021-07-29 PROCEDURE — 1125F AMNT PAIN NOTED PAIN PRSNT: CPT | Mod: CPTII,S$GLB,, | Performed by: INTERNAL MEDICINE

## 2021-07-29 PROCEDURE — 99999 PR PBB SHADOW E&M-EST. PATIENT-LVL IV: CPT | Mod: PBBFAC,,, | Performed by: INTERNAL MEDICINE

## 2021-07-29 PROCEDURE — 3288F FALL RISK ASSESSMENT DOCD: CPT | Mod: CPTII,S$GLB,, | Performed by: INTERNAL MEDICINE

## 2021-07-29 PROCEDURE — 1125F PR PAIN SEVERITY QUANTIFIED, PAIN PRESENT: ICD-10-PCS | Mod: CPTII,S$GLB,, | Performed by: INTERNAL MEDICINE

## 2021-07-29 PROCEDURE — 3078F PR MOST RECENT DIASTOLIC BLOOD PRESSURE < 80 MM HG: ICD-10-PCS | Mod: CPTII,S$GLB,, | Performed by: INTERNAL MEDICINE

## 2021-07-29 PROCEDURE — 1101F PT FALLS ASSESS-DOCD LE1/YR: CPT | Mod: CPTII,S$GLB,, | Performed by: INTERNAL MEDICINE

## 2021-07-29 PROCEDURE — 1159F PR MEDICATION LIST DOCUMENTED IN MEDICAL RECORD: ICD-10-PCS | Mod: CPTII,S$GLB,, | Performed by: INTERNAL MEDICINE

## 2021-07-29 PROCEDURE — 1159F MED LIST DOCD IN RCRD: CPT | Mod: CPTII,S$GLB,, | Performed by: INTERNAL MEDICINE

## 2021-07-29 PROCEDURE — 1101F PR PT FALLS ASSESS DOC 0-1 FALLS W/OUT INJ PAST YR: ICD-10-PCS | Mod: CPTII,S$GLB,, | Performed by: INTERNAL MEDICINE

## 2021-07-29 PROCEDURE — 1160F RVW MEDS BY RX/DR IN RCRD: CPT | Mod: CPTII,S$GLB,, | Performed by: INTERNAL MEDICINE

## 2021-07-29 PROCEDURE — 3074F PR MOST RECENT SYSTOLIC BLOOD PRESSURE < 130 MM HG: ICD-10-PCS | Mod: CPTII,S$GLB,, | Performed by: INTERNAL MEDICINE

## 2021-07-29 RX ORDER — ALLOPURINOL 100 MG/1
100 TABLET ORAL DAILY
COMMUNITY
Start: 2021-05-29

## 2022-01-26 ENCOUNTER — HOSPITAL ENCOUNTER (OUTPATIENT)
Dept: RADIOLOGY | Facility: HOSPITAL | Age: 77
Discharge: HOME | End: 2022-01-26
Attending: INTERNAL MEDICINE
Payer: MEDICARE

## 2022-01-26 DIAGNOSIS — C50.912 CANCER OF LEFT BREAST, STAGE 1, ESTROGEN RECEPTOR POSITIVE: ICD-10-CM

## 2022-01-26 DIAGNOSIS — Z17.0 CANCER OF LEFT BREAST, STAGE 1, ESTROGEN RECEPTOR POSITIVE: ICD-10-CM

## 2022-01-26 DIAGNOSIS — Z12.31 BREAST CANCER SCREENING BY MAMMOGRAM: ICD-10-CM

## 2022-01-26 PROCEDURE — 77063 MAMMO DIGITAL SCREENING BILAT WITH TOMO: ICD-10-PCS | Mod: 26,,, | Performed by: RADIOLOGY

## 2022-01-26 PROCEDURE — 77063 BREAST TOMOSYNTHESIS BI: CPT | Mod: TC

## 2022-01-26 PROCEDURE — 77063 BREAST TOMOSYNTHESIS BI: CPT | Mod: 26,,, | Performed by: RADIOLOGY

## 2022-01-26 PROCEDURE — 77067 MAMMO DIGITAL SCREENING BILAT WITH TOMO: ICD-10-PCS | Mod: 26,,, | Performed by: RADIOLOGY

## 2022-01-26 PROCEDURE — 77067 SCR MAMMO BI INCL CAD: CPT | Mod: 26,,, | Performed by: RADIOLOGY

## 2022-01-27 ENCOUNTER — OFFICE VISIT (OUTPATIENT)
Dept: HEMATOLOGY/ONCOLOGY | Facility: CLINIC | Age: 77
End: 2022-01-27
Payer: MEDICARE

## 2022-01-27 VITALS
SYSTOLIC BLOOD PRESSURE: 125 MMHG | WEIGHT: 272.25 LBS | HEART RATE: 90 BPM | DIASTOLIC BLOOD PRESSURE: 76 MMHG | RESPIRATION RATE: 19 BRPM | TEMPERATURE: 99 F | HEIGHT: 61 IN | OXYGEN SATURATION: 98 % | BODY MASS INDEX: 51.4 KG/M2

## 2022-01-27 DIAGNOSIS — C50.912 CANCER OF LEFT BREAST, STAGE 1, ESTROGEN RECEPTOR POSITIVE: Primary | ICD-10-CM

## 2022-01-27 DIAGNOSIS — E55.9 VITAMIN D DEFICIENCY: ICD-10-CM

## 2022-01-27 DIAGNOSIS — E53.8 B12 DEFICIENCY: ICD-10-CM

## 2022-01-27 DIAGNOSIS — Z17.0 CANCER OF LEFT BREAST, STAGE 1, ESTROGEN RECEPTOR POSITIVE: Primary | ICD-10-CM

## 2022-01-27 PROCEDURE — 3288F PR FALLS RISK ASSESSMENT DOCUMENTED: ICD-10-PCS | Mod: CPTII,S$GLB,, | Performed by: INTERNAL MEDICINE

## 2022-01-27 PROCEDURE — 1160F RVW MEDS BY RX/DR IN RCRD: CPT | Mod: CPTII,S$GLB,, | Performed by: INTERNAL MEDICINE

## 2022-01-27 PROCEDURE — 99214 OFFICE O/P EST MOD 30 MIN: CPT | Mod: S$GLB,,, | Performed by: INTERNAL MEDICINE

## 2022-01-27 PROCEDURE — 1101F PT FALLS ASSESS-DOCD LE1/YR: CPT | Mod: CPTII,S$GLB,, | Performed by: INTERNAL MEDICINE

## 2022-01-27 PROCEDURE — 1125F PR PAIN SEVERITY QUANTIFIED, PAIN PRESENT: ICD-10-PCS | Mod: CPTII,S$GLB,, | Performed by: INTERNAL MEDICINE

## 2022-01-27 PROCEDURE — 1125F AMNT PAIN NOTED PAIN PRSNT: CPT | Mod: CPTII,S$GLB,, | Performed by: INTERNAL MEDICINE

## 2022-01-27 PROCEDURE — 3078F PR MOST RECENT DIASTOLIC BLOOD PRESSURE < 80 MM HG: ICD-10-PCS | Mod: CPTII,S$GLB,, | Performed by: INTERNAL MEDICINE

## 2022-01-27 PROCEDURE — 99214 PR OFFICE/OUTPT VISIT, EST, LEVL IV, 30-39 MIN: ICD-10-PCS | Mod: S$GLB,,, | Performed by: INTERNAL MEDICINE

## 2022-01-27 PROCEDURE — 1101F PR PT FALLS ASSESS DOC 0-1 FALLS W/OUT INJ PAST YR: ICD-10-PCS | Mod: CPTII,S$GLB,, | Performed by: INTERNAL MEDICINE

## 2022-01-27 PROCEDURE — 3288F FALL RISK ASSESSMENT DOCD: CPT | Mod: CPTII,S$GLB,, | Performed by: INTERNAL MEDICINE

## 2022-01-27 PROCEDURE — 3074F PR MOST RECENT SYSTOLIC BLOOD PRESSURE < 130 MM HG: ICD-10-PCS | Mod: CPTII,S$GLB,, | Performed by: INTERNAL MEDICINE

## 2022-01-27 PROCEDURE — 3078F DIAST BP <80 MM HG: CPT | Mod: CPTII,S$GLB,, | Performed by: INTERNAL MEDICINE

## 2022-01-27 PROCEDURE — 99999 PR PBB SHADOW E&M-EST. PATIENT-LVL III: CPT | Mod: PBBFAC,,, | Performed by: INTERNAL MEDICINE

## 2022-01-27 PROCEDURE — 3074F SYST BP LT 130 MM HG: CPT | Mod: CPTII,S$GLB,, | Performed by: INTERNAL MEDICINE

## 2022-01-27 PROCEDURE — 1159F MED LIST DOCD IN RCRD: CPT | Mod: CPTII,S$GLB,, | Performed by: INTERNAL MEDICINE

## 2022-01-27 PROCEDURE — 1159F PR MEDICATION LIST DOCUMENTED IN MEDICAL RECORD: ICD-10-PCS | Mod: CPTII,S$GLB,, | Performed by: INTERNAL MEDICINE

## 2022-01-27 PROCEDURE — 1160F PR REVIEW ALL MEDS BY PRESCRIBER/CLIN PHARMACIST DOCUMENTED: ICD-10-PCS | Mod: CPTII,S$GLB,, | Performed by: INTERNAL MEDICINE

## 2022-01-27 PROCEDURE — 99999 PR PBB SHADOW E&M-EST. PATIENT-LVL III: ICD-10-PCS | Mod: PBBFAC,,, | Performed by: INTERNAL MEDICINE

## 2022-01-27 NOTE — PROGRESS NOTES
PATIENT: Karen Duke  MRN: [id number]  DATE: 1/27/2022    Diagnosis:   1. Cancer of left breast, stage 1, estrogen receptor positive    2. Vitamin D deficiency    3. B12 deficiency      Chief Complaint: BRCA    Subjective:     Pertinent Medical History:     PCP Dr. Sarina Macias.     She was diagnosed with left breast cancer on routine screening mammogram - in September 2017. A 1.1 centimeter lobulated mass was noticed in the 8:00 position in the left breast.  Images were done at [facility].  Biopsy was done on 9/22/17.  It was high-grade invasive ductal carcinoma - ER +96%, MD +94% and HER-2/bharti IHC 3+.  Ki-67 was 37%.      She has h/o sickle cell trait with no manifestation.  No history of blood transfusion.     Underwent left breast lumpectomy on 10/12/17. Lumpectomy specimen showed 2 foci of high grade (grade 3) invasive carcinoma - measuring 10 millimeters and 13 millimeters. Margins negative.  1 sentinel lymph node removed and it was negative.     Received 12 doses of weekly Taxol/Herceptin from Nov 2017 until Feb 2018. 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 Then completed adjuvant RT to left breast on 5/10/18.     Restarted adjuvant Herceptin 6/15/18. She got 3 doses.  She then started to have lots of chest pains and decided to discontinue the Herceptin infusions.  Last dose July 2018     Started Arimidex August 2018 and has been tolerating that well without any problems and issues.    Interval History:   -she is here for follow-up of carcinoma of the left breast  -she is on Arimidex since August 2018 without any problems and is tolerating it well  -staying mostly indoors, gaining weight, scared of COVID    Past Medical, Surgical, Family, and Social histories reviewed.    Medication and Allergies reviewed.    Review of Systems   Constitutional: Negative for fever and unexpected weight change.       Objective:     Vitals:    01/27/22 1405   BP: 125/76   BP Location: Right arm   Patient Position: Sitting   BP Method:  "Large (Automatic)   Pulse: 90   Resp: 19   Temp: 98.6 °F (37 °C)   TempSrc: Oral   SpO2: 98%   Weight: 123.5 kg (272 lb 4.3 oz)   Height: 5' 1" (1.549 m)       BMI: Body mass index is 51.44 kg/m².    Physical Exam  Vitals reviewed.   Constitutional:       General: She is not in acute distress.  Pulmonary:      Effort: Pulmonary effort is normal.      Breath sounds: Normal breath sounds.   Neurological:      Mental Status: She is alert and oriented to person, place, and time.       Assessment:       1. Cancer of left breast, stage 1, estrogen receptor positive    2. Vitamin D deficiency    3. B12 deficiency      Plan:   Cancer of left breast, stage 1, estrogen receptor positive  -she has stage I triple positive high-grade left breast invasive carcinoma status post lumpectomy October 2017  -completed adjuvant Herceptin based chemotherapy in July 2018  -completed radiation therapy to the left breast May 2018  -started Arimidex August 2018  -labs CBC, CMP and vitamin-D level reviewed  -DEXA January 2021 within normal limits  -needs repeat mammogram January 2023    Hypercalcemia  -noted  -it is mild  -asked her to increase water intake, eat healthy, do outdoor activity  -will monitor    Aromatase inhibitor use  -started August 2018  -plan 5 years of therapy, end date August 2023  -tolerating it well  -continue Arimidex 1 tablet daily, no changes     Vitamin D deficiency  -levels noted  -improved with Vit D supplementation  -cont Vit D to 2000 IU QD  -will check vitamin-D level in 6 months     B12 def  -on B12 supplementation, 5000 mcg QD    Anemia of chronic disease  -Hb stable  -will monitor          "

## 2022-07-11 ENCOUNTER — TELEPHONE (OUTPATIENT)
Dept: HEMATOLOGY/ONCOLOGY | Facility: CLINIC | Age: 77
End: 2022-07-11
Payer: MEDICARE

## 2022-07-27 ENCOUNTER — LAB VISIT (OUTPATIENT)
Dept: LAB | Facility: HOSPITAL | Age: 77
End: 2022-07-27
Attending: INTERNAL MEDICINE
Payer: MEDICARE

## 2022-07-27 DIAGNOSIS — E55.9 VITAMIN D DEFICIENCY: ICD-10-CM

## 2022-07-27 DIAGNOSIS — E53.8 B12 DEFICIENCY: ICD-10-CM

## 2022-07-27 DIAGNOSIS — C50.912 CANCER OF LEFT BREAST, STAGE 1, ESTROGEN RECEPTOR POSITIVE: ICD-10-CM

## 2022-07-27 DIAGNOSIS — Z17.0 CANCER OF LEFT BREAST, STAGE 1, ESTROGEN RECEPTOR POSITIVE: ICD-10-CM

## 2022-07-27 LAB
ALBUMIN SERPL BCP-MCNC: 3.8 G/DL (ref 3.5–5.2)
ALP SERPL-CCNC: 61 U/L (ref 55–135)
ALT SERPL W/O P-5'-P-CCNC: 10 U/L (ref 10–44)
ANION GAP SERPL CALC-SCNC: 15 MMOL/L (ref 8–16)
AST SERPL-CCNC: 13 U/L (ref 10–40)
BASOPHILS # BLD AUTO: 0.03 K/UL (ref 0–0.2)
BASOPHILS NFR BLD: 0.3 % (ref 0–1.9)
BILIRUB SERPL-MCNC: 0.2 MG/DL (ref 0.1–1)
BUN SERPL-MCNC: 14 MG/DL (ref 8–23)
CALCIUM SERPL-MCNC: 10 MG/DL (ref 8.7–10.5)
CHLORIDE SERPL-SCNC: 103 MMOL/L (ref 95–110)
CO2 SERPL-SCNC: 25 MMOL/L (ref 23–29)
CREAT SERPL-MCNC: 0.9 MG/DL (ref 0.5–1.4)
DIFFERENTIAL METHOD: ABNORMAL
EOSINOPHIL # BLD AUTO: 0.1 K/UL (ref 0–0.5)
EOSINOPHIL NFR BLD: 1.3 % (ref 0–8)
ERYTHROCYTE [DISTWIDTH] IN BLOOD BY AUTOMATED COUNT: 16.9 % (ref 11.5–14.5)
EST. GFR  (AFRICAN AMERICAN): >60 ML/MIN/1.73 M^2
EST. GFR  (NON AFRICAN AMERICAN): >60 ML/MIN/1.73 M^2
GLUCOSE SERPL-MCNC: 131 MG/DL (ref 70–110)
HCT VFR BLD AUTO: 35.5 % (ref 37–48.5)
HGB BLD-MCNC: 11.1 G/DL (ref 12–16)
IMM GRANULOCYTES # BLD AUTO: 0.03 K/UL (ref 0–0.04)
IMM GRANULOCYTES NFR BLD AUTO: 0.3 % (ref 0–0.5)
LYMPHOCYTES # BLD AUTO: 4.9 K/UL (ref 1–4.8)
LYMPHOCYTES NFR BLD: 48.6 % (ref 18–48)
MCH RBC QN AUTO: 23.9 PG (ref 27–31)
MCHC RBC AUTO-ENTMCNC: 31.3 G/DL (ref 32–36)
MCV RBC AUTO: 76 FL (ref 82–98)
MONOCYTES # BLD AUTO: 0.6 K/UL (ref 0.3–1)
MONOCYTES NFR BLD: 6.1 % (ref 4–15)
NEUTROPHILS # BLD AUTO: 4.3 K/UL (ref 1.8–7.7)
NEUTROPHILS NFR BLD: 43.4 % (ref 38–73)
NRBC BLD-RTO: 0 /100 WBC
PLATELET # BLD AUTO: 307 K/UL (ref 150–450)
PMV BLD AUTO: 9 FL (ref 9.2–12.9)
POTASSIUM SERPL-SCNC: 3.8 MMOL/L (ref 3.5–5.1)
PROT SERPL-MCNC: 8 G/DL (ref 6–8.4)
RBC # BLD AUTO: 4.65 M/UL (ref 4–5.4)
SODIUM SERPL-SCNC: 143 MMOL/L (ref 136–145)
WBC # BLD AUTO: 10 K/UL (ref 3.9–12.7)

## 2022-07-27 PROCEDURE — 80053 COMPREHEN METABOLIC PANEL: CPT | Performed by: INTERNAL MEDICINE

## 2022-07-27 PROCEDURE — 36415 COLL VENOUS BLD VENIPUNCTURE: CPT | Performed by: INTERNAL MEDICINE

## 2022-07-27 PROCEDURE — 85025 COMPLETE CBC W/AUTO DIFF WBC: CPT | Performed by: INTERNAL MEDICINE

## 2022-08-02 ENCOUNTER — OFFICE VISIT (OUTPATIENT)
Dept: HEMATOLOGY/ONCOLOGY | Facility: CLINIC | Age: 77
End: 2022-08-02
Payer: MEDICARE

## 2022-08-02 VITALS
RESPIRATION RATE: 20 BRPM | HEART RATE: 100 BPM | DIASTOLIC BLOOD PRESSURE: 64 MMHG | BODY MASS INDEX: 50.99 KG/M2 | HEIGHT: 61 IN | WEIGHT: 270.06 LBS | SYSTOLIC BLOOD PRESSURE: 133 MMHG | TEMPERATURE: 97 F | OXYGEN SATURATION: 95 %

## 2022-08-02 DIAGNOSIS — Z78.0 ASYMPTOMATIC MENOPAUSAL STATE: ICD-10-CM

## 2022-08-02 DIAGNOSIS — Z17.0 CANCER OF LEFT BREAST, STAGE 1, ESTROGEN RECEPTOR POSITIVE: ICD-10-CM

## 2022-08-02 DIAGNOSIS — C50.912 CANCER OF LEFT BREAST, STAGE 1, ESTROGEN RECEPTOR POSITIVE: Primary | ICD-10-CM

## 2022-08-02 DIAGNOSIS — Z79.811 AROMATASE INHIBITOR USE: ICD-10-CM

## 2022-08-02 DIAGNOSIS — C50.912 CANCER OF LEFT BREAST, STAGE 1, ESTROGEN RECEPTOR POSITIVE: ICD-10-CM

## 2022-08-02 DIAGNOSIS — Z17.0 CANCER OF LEFT BREAST, STAGE 1, ESTROGEN RECEPTOR POSITIVE: Primary | ICD-10-CM

## 2022-08-02 DIAGNOSIS — Z12.31 ENCOUNTER FOR SCREENING MAMMOGRAM FOR MALIGNANT NEOPLASM OF BREAST: ICD-10-CM

## 2022-08-02 DIAGNOSIS — E55.9 VITAMIN D DEFICIENCY: ICD-10-CM

## 2022-08-02 DIAGNOSIS — E87.6 HYPOKALEMIA: ICD-10-CM

## 2022-08-02 DIAGNOSIS — E53.8 B12 DEFICIENCY: ICD-10-CM

## 2022-08-02 PROCEDURE — 1101F PT FALLS ASSESS-DOCD LE1/YR: CPT | Mod: CPTII,S$GLB,, | Performed by: INTERNAL MEDICINE

## 2022-08-02 PROCEDURE — 1159F MED LIST DOCD IN RCRD: CPT | Mod: CPTII,S$GLB,, | Performed by: INTERNAL MEDICINE

## 2022-08-02 PROCEDURE — 3288F FALL RISK ASSESSMENT DOCD: CPT | Mod: CPTII,S$GLB,, | Performed by: INTERNAL MEDICINE

## 2022-08-02 PROCEDURE — 1126F PR PAIN SEVERITY QUANTIFIED, NO PAIN PRESENT: ICD-10-PCS | Mod: CPTII,S$GLB,, | Performed by: INTERNAL MEDICINE

## 2022-08-02 PROCEDURE — 3075F PR MOST RECENT SYSTOLIC BLOOD PRESS GE 130-139MM HG: ICD-10-PCS | Mod: CPTII,S$GLB,, | Performed by: INTERNAL MEDICINE

## 2022-08-02 PROCEDURE — 99999 PR PBB SHADOW E&M-EST. PATIENT-LVL III: ICD-10-PCS | Mod: PBBFAC,,, | Performed by: INTERNAL MEDICINE

## 2022-08-02 PROCEDURE — 3075F SYST BP GE 130 - 139MM HG: CPT | Mod: CPTII,S$GLB,, | Performed by: INTERNAL MEDICINE

## 2022-08-02 PROCEDURE — 99999 PR PBB SHADOW E&M-EST. PATIENT-LVL III: CPT | Mod: PBBFAC,,, | Performed by: INTERNAL MEDICINE

## 2022-08-02 PROCEDURE — 99214 PR OFFICE/OUTPT VISIT, EST, LEVL IV, 30-39 MIN: ICD-10-PCS | Mod: S$GLB,,, | Performed by: INTERNAL MEDICINE

## 2022-08-02 PROCEDURE — 1126F AMNT PAIN NOTED NONE PRSNT: CPT | Mod: CPTII,S$GLB,, | Performed by: INTERNAL MEDICINE

## 2022-08-02 PROCEDURE — 1160F RVW MEDS BY RX/DR IN RCRD: CPT | Mod: CPTII,S$GLB,, | Performed by: INTERNAL MEDICINE

## 2022-08-02 PROCEDURE — 1159F PR MEDICATION LIST DOCUMENTED IN MEDICAL RECORD: ICD-10-PCS | Mod: CPTII,S$GLB,, | Performed by: INTERNAL MEDICINE

## 2022-08-02 PROCEDURE — 99214 OFFICE O/P EST MOD 30 MIN: CPT | Mod: S$GLB,,, | Performed by: INTERNAL MEDICINE

## 2022-08-02 PROCEDURE — 3078F PR MOST RECENT DIASTOLIC BLOOD PRESSURE < 80 MM HG: ICD-10-PCS | Mod: CPTII,S$GLB,, | Performed by: INTERNAL MEDICINE

## 2022-08-02 PROCEDURE — 1101F PR PT FALLS ASSESS DOC 0-1 FALLS W/OUT INJ PAST YR: ICD-10-PCS | Mod: CPTII,S$GLB,, | Performed by: INTERNAL MEDICINE

## 2022-08-02 PROCEDURE — 1160F PR REVIEW ALL MEDS BY PRESCRIBER/CLIN PHARMACIST DOCUMENTED: ICD-10-PCS | Mod: CPTII,S$GLB,, | Performed by: INTERNAL MEDICINE

## 2022-08-02 PROCEDURE — 3078F DIAST BP <80 MM HG: CPT | Mod: CPTII,S$GLB,, | Performed by: INTERNAL MEDICINE

## 2022-08-02 PROCEDURE — 3288F PR FALLS RISK ASSESSMENT DOCUMENTED: ICD-10-PCS | Mod: CPTII,S$GLB,, | Performed by: INTERNAL MEDICINE

## 2022-08-02 RX ORDER — NIRMATRELVIR AND RITONAVIR 300-100 MG
KIT ORAL
COMMUNITY
Start: 2022-06-05

## 2022-08-02 RX ORDER — POTASSIUM CHLORIDE 20 MEQ/1
20 TABLET, EXTENDED RELEASE ORAL DAILY
Qty: 90 TABLET | Refills: 3 | Status: SHIPPED | OUTPATIENT
Start: 2022-08-02 | End: 2023-10-18 | Stop reason: SDUPTHER

## 2022-08-02 RX ORDER — ANASTROZOLE 1 MG/1
1 TABLET ORAL DAILY
Qty: 90 TABLET | Refills: 3 | Status: SHIPPED | OUTPATIENT
Start: 2022-08-02

## 2022-08-02 NOTE — PROGRESS NOTES
PATIENT: Karen Duke  MRN: [id number]  DATE: 8/2/2022    Diagnosis:   1. Cancer of left breast, stage 1, estrogen receptor positive    2. Vitamin D deficiency    3. B12 deficiency    4. Aromatase inhibitor use      Chief Complaint: BRCA    Subjective:     Pertinent Medical History:     PCP Dr. Sarina Macias.     She was diagnosed with left breast cancer on routine screening mammogram - in September 2017. A 1.1 centimeter lobulated mass was noticed in the 8:00 position in the left breast.  Images were done at [facility].  Biopsy was done on 9/22/17.  It was high-grade invasive ductal carcinoma - ER +96%, SD +94% and HER-2/bharti IHC 3+.  Ki-67 was 37%.      She has h/o sickle cell trait with no manifestation.  No history of blood transfusion.     Underwent left breast lumpectomy on 10/12/17. Lumpectomy specimen showed 2 foci of high grade (grade 3) invasive carcinoma - measuring 10 millimeters and 13 millimeters. Margins negative.  1 sentinel lymph node removed and it was negative.     Received 12 doses of weekly Taxol/Herceptin from Nov 2017 until Feb 2018. 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 Then completed adjuvant RT to left breast on 5/10/18.     Restarted adjuvant Herceptin 6/15/18. She got 3 doses.  She then started to have lots of chest pains and decided to discontinue the Herceptin infusions.  Last dose July 2018     Started Arimidex August 2018 and has been tolerating that well without any problems and issues.    Interval History:   -she is here for follow-up of carcinoma of the left breast  -she is on Arimidex since August 2018 without any problems and is tolerating it well    Past Medical, Surgical, Family, and Social histories reviewed.    Medication and Allergies reviewed.    Review of Systems   Constitutional: Negative for fever and unexpected weight change.       Objective:     Vitals:    08/02/22 0948   BP: 133/64   BP Location: Right arm   Patient Position: Sitting   BP Method: Large (Automatic)   Pulse: 100  "  Resp: 20   Temp: 97.3 °F (36.3 °C)   TempSrc: Oral   SpO2: 95%   Weight: 122.5 kg (270 lb 1 oz)   Height: 5' 1" (1.549 m)       BMI: Body mass index is 51.03 kg/m².    Physical Exam  Vitals reviewed.   Constitutional:       General: She is not in acute distress.  Pulmonary:      Effort: Pulmonary effort is normal.      Breath sounds: Normal breath sounds.   Neurological:      Mental Status: She is alert and oriented to person, place, and time.       Assessment:       1. Cancer of left breast, stage 1, estrogen receptor positive    2. Vitamin D deficiency    3. B12 deficiency    4. Aromatase inhibitor use      Plan:   Cancer of left breast, stage 1, estrogen receptor positive  -she has stage I triple positive high-grade left breast invasive carcinoma status post lumpectomy October 2017  -completed adjuvant Herceptin based chemotherapy in July 2018  -completed radiation therapy to the left breast May 2018  -started Arimidex August 2018  -labs CBC, CMP and vitamin-D level reviewed  -DEXA January 2021 within normal limits, will repeat in 6 mo  -repeat mammogram January 2023    Aromatase inhibitor use  -started August 2018  -plan 5 years of therapy, end date August 2023  -tolerating it well  -continue Arimidex 1 tablet daily, no changes     Vitamin D deficiency  -cont Vit D to 2000 IU QD  -will check vitamin-D level in 6 months     B12 def  -on B12 supplementation, 5000 mcg QD    Anemia of chronic disease  -Hb stable  -will monitor            "

## 2023-01-27 ENCOUNTER — HOSPITAL ENCOUNTER (OUTPATIENT)
Dept: RADIOLOGY | Facility: HOSPITAL | Age: 78
Discharge: HOME | End: 2023-01-27
Attending: INTERNAL MEDICINE
Payer: MEDICARE

## 2023-01-27 DIAGNOSIS — Z17.0 CANCER OF LEFT BREAST, STAGE 1, ESTROGEN RECEPTOR POSITIVE: ICD-10-CM

## 2023-01-27 DIAGNOSIS — Z12.31 ENCOUNTER FOR SCREENING MAMMOGRAM FOR MALIGNANT NEOPLASM OF BREAST: ICD-10-CM

## 2023-01-27 DIAGNOSIS — Z78.0 ASYMPTOMATIC MENOPAUSAL STATE: ICD-10-CM

## 2023-01-27 DIAGNOSIS — C50.912 CANCER OF LEFT BREAST, STAGE 1, ESTROGEN RECEPTOR POSITIVE: ICD-10-CM

## 2023-01-27 PROCEDURE — 77080 DXA BONE DENSITY AXIAL: CPT | Mod: TC

## 2023-01-27 PROCEDURE — 77067 SCR MAMMO BI INCL CAD: CPT | Mod: TC

## 2023-01-27 PROCEDURE — 77067 SCR MAMMO BI INCL CAD: CPT | Mod: 26,,, | Performed by: RADIOLOGY

## 2023-01-27 PROCEDURE — 77063 MAMMO DIGITAL SCREENING BILAT WITH TOMO: ICD-10-PCS | Mod: 26,,, | Performed by: RADIOLOGY

## 2023-01-27 PROCEDURE — 77067 MAMMO DIGITAL SCREENING BILAT WITH TOMO: ICD-10-PCS | Mod: 26,,, | Performed by: RADIOLOGY

## 2023-01-27 PROCEDURE — 77063 BREAST TOMOSYNTHESIS BI: CPT | Mod: 26,,, | Performed by: RADIOLOGY

## 2023-01-27 PROCEDURE — 77080 DXA BONE DENSITY AXIAL: CPT | Mod: 26,,, | Performed by: RADIOLOGY

## 2023-01-27 PROCEDURE — 77080 DEXA BONE DENSITY SPINE HIP: ICD-10-PCS | Mod: 26,,, | Performed by: RADIOLOGY

## 2023-01-30 PROBLEM — E11.9 DM (DIABETES MELLITUS): Status: RESOLVED | Noted: 2017-10-10 | Resolved: 2023-01-30

## 2023-01-30 NOTE — PROGRESS NOTES
PATIENT: Karen Duke  MRN: [id number]  DATE: 1/31/2023    Diagnosis:   1. Cancer of left breast, stage 1, estrogen receptor positive    2. Aromatase inhibitor use    3. B12 deficiency    4. Type 2 diabetes mellitus with hyperglycemia, without long-term current use of insulin    5. Morbid obesity    6. Sickle cell trait    7. Encounter for screening mammogram for malignant neoplasm of breast      Chief Complaint: BRCA    Subjective:     Pertinent Medical History:     PCP Dr. Sarina Macias.     She was diagnosed with left breast cancer on routine screening mammogram - in September 2017. A 1.1 centimeter lobulated mass was noticed in the 8:00 position in the left breast.  Images were done at [facility].  Biopsy was done on 9/22/17.  It was high-grade invasive ductal carcinoma - ER +96%, WV +94% and HER-2/bharti IHC 3+.  Ki-67 was 37%.      She has h/o sickle cell trait with no manifestation.  No history of blood transfusion.     Underwent left breast lumpectomy on 10/12/17. Lumpectomy specimen showed 2 foci of high grade (grade 3) invasive carcinoma - measuring 10 millimeters and 13 millimeters. Margins negative.  1 sentinel lymph node removed and it was negative.     Received 12 doses of weekly Taxol/Herceptin from Nov 2017 until Feb 2018. 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 Then completed adjuvant RT to left breast on 5/10/18.     Restarted adjuvant Herceptin 6/15/18. She got 3 doses.  She then started to have lots of chest pains and decided to discontinue the Herceptin infusions.  Last dose July 2018     Started Arimidex August 2018 and has been tolerating that well without any problems and issues.    Interval History:   - she is here for follow-up of carcinoma of the left breast. She had previously seen Dr. Wilkins.  - she had a mammogram on 1/27/23.  - she is still anastrozole.  - today, she is doing well. She denies shortness of breath, chest pain, nausea, vomiting, diarrhea, constipation.      Past Medical, Surgical, Family,  and Social histories reviewed.    Medication and Allergies reviewed.    Review of Systems   Constitutional:  Negative for fatigue, fever and unexpected weight change.   HENT:  Negative for sore throat.    Eyes:  Negative for visual disturbance.   Respiratory:  Negative for cough and shortness of breath.    Cardiovascular:  Negative for chest pain.   Gastrointestinal:  Negative for abdominal pain, constipation, diarrhea, nausea and vomiting.   Genitourinary:  Negative for dysuria.   Musculoskeletal:  Negative for back pain.   Skin:  Negative for rash.   Neurological:  Negative for headaches.   Hematological:  Negative for adenopathy.   Psychiatric/Behavioral:  The patient is not nervous/anxious.      Objective:     Vitals:    01/31/23 1048   BP: (!) 146/64   BP Location: Right arm   Patient Position: Sitting   BP Method: Large (Automatic)   Pulse: 105   Resp: 18   SpO2: 99%   Weight: 120 kg (264 lb 8.8 oz)       BMI: Body mass index is 49.99 kg/m².    Physical Exam  Vitals and nursing note reviewed.   Constitutional:       General: She is not in acute distress.     Appearance: She is well-developed.   HENT:      Head: Normocephalic and atraumatic.   Eyes:      Pupils: Pupils are equal, round, and reactive to light.   Cardiovascular:      Rate and Rhythm: Normal rate and regular rhythm.   Pulmonary:      Effort: Pulmonary effort is normal.      Breath sounds: Normal breath sounds.   Abdominal:      General: Bowel sounds are normal.      Palpations: Abdomen is soft.   Musculoskeletal:         General: Normal range of motion.      Cervical back: Normal range of motion and neck supple.   Skin:     General: Skin is warm and dry.   Neurological:      Mental Status: She is alert and oriented to person, place, and time.   Psychiatric:         Behavior: Behavior normal.         Thought Content: Thought content normal.         Judgment: Judgment normal.       Imaging:  Mammogram (1/27/23):  Official report is pending.      DXA  scan (1/27/23):  Normal bone mineral density.      Assessment:       1. Cancer of left breast, stage 1, estrogen receptor positive    2. Aromatase inhibitor use    3. B12 deficiency    4. Type 2 diabetes mellitus with hyperglycemia, without long-term current use of insulin    5. Morbid obesity    6. Sickle cell trait      Plan:     Cancer of left breast, stage 1, estrogen receptor positive  -she has stage I triple positive high-grade left breast invasive carcinoma status post lumpectomy October 2017  -completed adjuvant Herceptin based chemotherapy in July 2018  -completed radiation therapy to the left breast May 2018  -started Arimidex August 2018  -labs CBC, CMP and vitamin-D level reviewed  -DEXA January 2021 within normal limits  - DXA scan (1/27/23) revealed normal bone mineral density.  - mammogram (1/27/23) report is pending. Assuming normal results, we will schedule next one in January 2024.  - continue anastrozole. She will stop in August 2023.  - return to clinic in one year with repeat mammogram.    Aromatase inhibitor use  -started August 2018  -plan 5 years of therapy, end date August 2023  -tolerating it well  -continue Arimidex 1 tablet daily. She will stop in August 2023.     Morbid obesity  - Body mass index is 49.99 kg/m².  - I encouraged weight loss    Sickle cell trait  - she states she has this. Likely explanation for microcytic anemia      - return to clinic in one year with repeat mammogram.      Heriberto Roque M.D.  Hematology/Oncology  [facility] - [address], [address]  [place] [address]  Phone: [phone]  Fax: [phone]

## 2023-01-31 ENCOUNTER — OFFICE VISIT (OUTPATIENT)
Dept: HEMATOLOGY/ONCOLOGY | Facility: CLINIC | Age: 78
End: 2023-01-31
Payer: MEDICARE

## 2023-01-31 VITALS
OXYGEN SATURATION: 99 % | WEIGHT: 264.56 LBS | SYSTOLIC BLOOD PRESSURE: 146 MMHG | BODY MASS INDEX: 49.99 KG/M2 | HEART RATE: 105 BPM | RESPIRATION RATE: 18 BRPM | DIASTOLIC BLOOD PRESSURE: 64 MMHG

## 2023-01-31 DIAGNOSIS — E66.01 MORBID OBESITY: ICD-10-CM

## 2023-01-31 DIAGNOSIS — Z17.0 CANCER OF LEFT BREAST, STAGE 1, ESTROGEN RECEPTOR POSITIVE: Primary | ICD-10-CM

## 2023-01-31 DIAGNOSIS — E53.8 B12 DEFICIENCY: ICD-10-CM

## 2023-01-31 DIAGNOSIS — Z79.811 AROMATASE INHIBITOR USE: ICD-10-CM

## 2023-01-31 DIAGNOSIS — Z12.31 ENCOUNTER FOR SCREENING MAMMOGRAM FOR MALIGNANT NEOPLASM OF BREAST: ICD-10-CM

## 2023-01-31 DIAGNOSIS — E11.65 TYPE 2 DIABETES MELLITUS WITH HYPERGLYCEMIA, WITHOUT LONG-TERM CURRENT USE OF INSULIN: ICD-10-CM

## 2023-01-31 DIAGNOSIS — C50.912 CANCER OF LEFT BREAST, STAGE 1, ESTROGEN RECEPTOR POSITIVE: Primary | ICD-10-CM

## 2023-01-31 DIAGNOSIS — D57.3 SICKLE CELL TRAIT: ICD-10-CM

## 2023-01-31 PROCEDURE — 1159F MED LIST DOCD IN RCRD: CPT | Mod: CPTII,S$GLB,, | Performed by: INTERNAL MEDICINE

## 2023-01-31 PROCEDURE — 99999 PR PBB SHADOW E&M-EST. PATIENT-LVL IV: CPT | Mod: PBBFAC,,, | Performed by: INTERNAL MEDICINE

## 2023-01-31 PROCEDURE — 3288F FALL RISK ASSESSMENT DOCD: CPT | Mod: CPTII,S$GLB,, | Performed by: INTERNAL MEDICINE

## 2023-01-31 PROCEDURE — 99214 PR OFFICE/OUTPT VISIT, EST, LEVL IV, 30-39 MIN: ICD-10-PCS | Mod: S$GLB,,, | Performed by: INTERNAL MEDICINE

## 2023-01-31 PROCEDURE — 99214 OFFICE O/P EST MOD 30 MIN: CPT | Mod: S$GLB,,, | Performed by: INTERNAL MEDICINE

## 2023-01-31 PROCEDURE — 3077F PR MOST RECENT SYSTOLIC BLOOD PRESSURE >= 140 MM HG: ICD-10-PCS | Mod: CPTII,S$GLB,, | Performed by: INTERNAL MEDICINE

## 2023-01-31 PROCEDURE — 1160F PR REVIEW ALL MEDS BY PRESCRIBER/CLIN PHARMACIST DOCUMENTED: ICD-10-PCS | Mod: CPTII,S$GLB,, | Performed by: INTERNAL MEDICINE

## 2023-01-31 PROCEDURE — 1126F PR PAIN SEVERITY QUANTIFIED, NO PAIN PRESENT: ICD-10-PCS | Mod: CPTII,S$GLB,, | Performed by: INTERNAL MEDICINE

## 2023-01-31 PROCEDURE — 1101F PT FALLS ASSESS-DOCD LE1/YR: CPT | Mod: CPTII,S$GLB,, | Performed by: INTERNAL MEDICINE

## 2023-01-31 PROCEDURE — 1159F PR MEDICATION LIST DOCUMENTED IN MEDICAL RECORD: ICD-10-PCS | Mod: CPTII,S$GLB,, | Performed by: INTERNAL MEDICINE

## 2023-01-31 PROCEDURE — 3077F SYST BP >= 140 MM HG: CPT | Mod: CPTII,S$GLB,, | Performed by: INTERNAL MEDICINE

## 2023-01-31 PROCEDURE — 1160F RVW MEDS BY RX/DR IN RCRD: CPT | Mod: CPTII,S$GLB,, | Performed by: INTERNAL MEDICINE

## 2023-01-31 PROCEDURE — 99999 PR PBB SHADOW E&M-EST. PATIENT-LVL IV: ICD-10-PCS | Mod: PBBFAC,,, | Performed by: INTERNAL MEDICINE

## 2023-01-31 PROCEDURE — 3078F PR MOST RECENT DIASTOLIC BLOOD PRESSURE < 80 MM HG: ICD-10-PCS | Mod: CPTII,S$GLB,, | Performed by: INTERNAL MEDICINE

## 2023-01-31 PROCEDURE — 3288F PR FALLS RISK ASSESSMENT DOCUMENTED: ICD-10-PCS | Mod: CPTII,S$GLB,, | Performed by: INTERNAL MEDICINE

## 2023-01-31 PROCEDURE — 1126F AMNT PAIN NOTED NONE PRSNT: CPT | Mod: CPTII,S$GLB,, | Performed by: INTERNAL MEDICINE

## 2023-01-31 PROCEDURE — 3078F DIAST BP <80 MM HG: CPT | Mod: CPTII,S$GLB,, | Performed by: INTERNAL MEDICINE

## 2023-01-31 PROCEDURE — 1101F PR PT FALLS ASSESS DOC 0-1 FALLS W/OUT INJ PAST YR: ICD-10-PCS | Mod: CPTII,S$GLB,, | Performed by: INTERNAL MEDICINE

## 2023-02-02 ENCOUNTER — TELEPHONE (OUTPATIENT)
Dept: HEMATOLOGY/ONCOLOGY | Facility: CLINIC | Age: 78
End: 2023-02-02
Payer: MEDICARE

## 2023-02-16 ENCOUNTER — HOSPITAL ENCOUNTER (OUTPATIENT)
Dept: RADIOLOGY | Facility: HOSPITAL | Age: 78
Discharge: HOME | End: 2023-02-16
Attending: FAMILY MEDICINE
Payer: MEDICARE

## 2023-02-16 DIAGNOSIS — D63.8 ANEMIA, CHRONIC DISEASE: ICD-10-CM

## 2023-02-16 DIAGNOSIS — R14.2 BELCHING: ICD-10-CM

## 2023-02-16 DIAGNOSIS — K21.9 GASTROESOPHAGEAL REFLUX DISEASE, UNSPECIFIED WHETHER ESOPHAGITIS PRESENT: ICD-10-CM

## 2023-02-16 PROCEDURE — 76705 US ABDOMEN LIMITED: ICD-10-PCS | Mod: 26,,, | Performed by: RADIOLOGY

## 2023-02-16 PROCEDURE — 76705 ECHO EXAM OF ABDOMEN: CPT | Mod: TC

## 2023-02-16 PROCEDURE — 76705 ECHO EXAM OF ABDOMEN: CPT | Mod: 26,,, | Performed by: RADIOLOGY

## 2023-05-07 NOTE — TELEPHONE ENCOUNTER
Last time took Decadron pills before chemo. She is starting chemo Friday. Does she restart the oral medication? If so will need refill [facility] in [place].   Patient : Tony Dunham Age: 19 year old Sex: male   MRN: [id number] Encounter Date: 2023  E05/05    History     Chief Complaint   Patient presents with   • Medical Screening Exam       HPI    Tony Dunham is a 19 year old presenting to the emergency department by [facility] after he had called them for help. The pt states that he is \"escaping from hell\" noting that he does not want to return to his mothers house. He notes that he wishes he was with his father who had  8 years ago. The pt denies the use of illicit drugs as well as psychiatric medications. The pt denies SI and self-harm.     I have reviewed Tony Dunham's previous office visit note from 2022.  Note Review Summary: Pt has a hx of autism, adjustment disorder with grief.     No Known Allergies    No current facility-administered medications for this encounter.     No current outpatient medications on file.       Past Medical History:   Diagnosis Date   • Asperger syndrome        History reviewed. No pertinent surgical history.    No family history on file.    Social History     Tobacco Use   • Smoking status: Never   Substance Use Topics   • Alcohol use: Never   • Drug use: Never       Review of Systems     Review of Systems   Constitutional: Negative for activity change, appetite change, chills, fatigue and fever.   HENT: Negative for congestion.    Eyes: Negative for discharge and visual disturbance.   Respiratory: Negative for cough and shortness of breath.    Cardiovascular: Negative for chest pain and leg swelling.   Gastrointestinal: Negative for abdominal pain, anal bleeding, diarrhea, nausea and vomiting.   Endocrine: Negative for heat intolerance.   Genitourinary: Negative for decreased urine volume, difficulty urinating, dysuria, frequency, hematuria and urgency.   Musculoskeletal: Negative for arthralgias, myalgias and neck pain.   Skin: Negative for color change and rash.   Neurological: Negative for dizziness, weakness, light-headedness  and numbness.   Hematological: Does not bruise/bleed easily.   Psychiatric/Behavioral: Negative for confusion, self-injury and suicidal ideas. The patient is not nervous/anxious.        Physical Exam     ED Triage Vitals [05/07/23 1453]   ED Triage Vitals Group      Temp 98.9 °F (37.2 °C)      Heart Rate (!) 115      Resp 16      /66      SpO2 99 %      EtCO2 mmHg       Height 6' (1.829 m)      Weight 180 lb (81.6 kg)      Weight Scale Used       BMI (Calculated) 24.41      IBW/kg (Calculated) 77.6       Physical Exam  Vitals and nursing note reviewed.   Constitutional:       General: He is not in acute distress.     Appearance: He is well-developed.   HENT:      Head: Normocephalic and atraumatic.      Nose: Nose normal.   Eyes:      Conjunctiva/sclera: Conjunctivae normal.      Pupils: Pupils are equal, round, and reactive to light.   Cardiovascular:      Rate and Rhythm: Normal rate and regular rhythm.      Heart sounds: Normal heart sounds.   Pulmonary:      Effort: Pulmonary effort is normal. No respiratory distress.      Breath sounds: Normal breath sounds.   Abdominal:      General: Bowel sounds are normal.      Palpations: Abdomen is soft.      Tenderness: There is no abdominal tenderness. There is no guarding or rebound.   Musculoskeletal:         General: No tenderness. Normal range of motion.      Cervical back: Normal range of motion and neck supple.   Skin:     General: Skin is warm and dry.      Findings: No rash.   Neurological:      Mental Status: He is alert and oriented to person, place, and time.      GCS: GCS eye subscore is 4. GCS verbal subscore is 5. GCS motor subscore is 6.      Cranial Nerves: No cranial nerve deficit.   Psychiatric:         Mood and Affect: Affect is flat.         Thought Content: Thought content does not include suicidal ideation.      Comments: Pt is withdrawn.          Procedures     Procedures    Lab Results     No results found for this visit on 05/07/23.    EKG      No EKG performed    Radiology Results     Imaging Results    None         ED Medications     Medications - No data to display      ED Course     Vitals:    05/07/23 1453   BP: 132/66   BP Location: LUE - Left upper extremity   Patient Position: Semi-Wilson's   Pulse: (!) 115   Resp: 16   Temp: 98.9 °F (37.2 °C)   TempSrc: Oral   SpO2: 99%   Weight: 81.6 kg (180 lb)   Height: 6' (1.829 m)       ED Course as of 05/07/23 1737   Sun May 07, 2023   1632 Per RN, the pt would like to leave at this time. He denies SI and HI. The pt notes that he does want to go stay with his grandma at this time. [KD]   1636 I rechecked the pt who is soft spoken at this time. He denies SI and HI. We discussed the plan of care including follow up with his PCP. I informed him that he should return to the ED for any new or worsening symptoms.  They understand and agree with the plan. Any questions were answered. [KD]      ED Course User Index  [KD] Phillip Ge       No cardiac monitor or imaging reviewed        Consults                ED Consults done in the ED course    MDM                             Patient is a 19 year old with complaint of depression and anxiety.  My differential diagnosis includes but is not limited to anxiety, depression. The patient will not require admission.  Patient denied any SI or HI.  Wanted to go home.  He will be discharged home with outpatient resources.  Patient has been given return precautions.        Does the Patient have sepsis: NO     Critical Care       No Critical Care        Disposition       Clinical Impression and Diagnosis  5:39 PM       ED Diagnoses     Diagnosis Comment Associated Orders       Final diagnoses    Recurrent major depressive disorder, remission status unspecified (CMD) -- --    Anxiety -- --          Follow Up:  Tod Harris MD  [address] [facility] [address]  [phone]    In 1 week            Summary of your Discharge Medications      You have not been  prescribed any medications.         Pt is discharged to home/self care in stable condition.              There is no disposition no dispo time  There is no comment              I have reviewed the information recorded by the scribe for accuracy and agree with its contents.    ____________________________________________________________________    Phillip Ge acting as a scribe for Dr. Radames Iqbal  Dictation # [id number]  Scribe: Radames Montague,   05/07/23 4163

## 2023-10-18 DIAGNOSIS — E87.6 HYPOKALEMIA: ICD-10-CM

## 2023-10-18 RX ORDER — POTASSIUM CHLORIDE 20 MEQ/1
20 TABLET, EXTENDED RELEASE ORAL DAILY
Qty: 90 TABLET | Refills: 3 | Status: SHIPPED | OUTPATIENT
Start: 2023-10-18

## 2023-11-06 DIAGNOSIS — M25.561 RIGHT KNEE PAIN, UNSPECIFIED CHRONICITY: Primary | ICD-10-CM

## 2023-11-07 ENCOUNTER — OFFICE VISIT (OUTPATIENT)
Dept: ORTHOPEDICS | Facility: CLINIC | Age: 78
End: 2023-11-07
Payer: MEDICARE

## 2023-11-07 ENCOUNTER — HOSPITAL ENCOUNTER (OUTPATIENT)
Dept: RADIOLOGY | Facility: HOSPITAL | Age: 78
Discharge: HOME | End: 2023-11-07
Attending: ORTHOPAEDIC SURGERY
Payer: MEDICARE

## 2023-11-07 VITALS
SYSTOLIC BLOOD PRESSURE: 125 MMHG | BODY MASS INDEX: 49.08 KG/M2 | DIASTOLIC BLOOD PRESSURE: 78 MMHG | WEIGHT: 259.94 LBS | HEIGHT: 61 IN | HEART RATE: 114 BPM

## 2023-11-07 DIAGNOSIS — M25.561 CHRONIC PAIN OF RIGHT KNEE: ICD-10-CM

## 2023-11-07 DIAGNOSIS — M25.561 RIGHT KNEE PAIN, UNSPECIFIED CHRONICITY: ICD-10-CM

## 2023-11-07 DIAGNOSIS — M17.11 PRIMARY OSTEOARTHRITIS OF RIGHT KNEE: Primary | ICD-10-CM

## 2023-11-07 DIAGNOSIS — G89.29 CHRONIC PAIN OF RIGHT KNEE: ICD-10-CM

## 2023-11-07 PROCEDURE — 20610 LARGE JOINT ASPIRATION/INJECTION: R KNEE: ICD-10-PCS | Mod: RT,S$GLB,, | Performed by: STUDENT IN AN ORGANIZED HEALTH CARE EDUCATION/TRAINING PROGRAM

## 2023-11-07 PROCEDURE — 3074F SYST BP LT 130 MM HG: CPT | Mod: CPTII,S$GLB,, | Performed by: STUDENT IN AN ORGANIZED HEALTH CARE EDUCATION/TRAINING PROGRAM

## 2023-11-07 PROCEDURE — 3078F DIAST BP <80 MM HG: CPT | Mod: CPTII,S$GLB,, | Performed by: STUDENT IN AN ORGANIZED HEALTH CARE EDUCATION/TRAINING PROGRAM

## 2023-11-07 PROCEDURE — 3074F PR MOST RECENT SYSTOLIC BLOOD PRESSURE < 130 MM HG: ICD-10-PCS | Mod: CPTII,S$GLB,, | Performed by: STUDENT IN AN ORGANIZED HEALTH CARE EDUCATION/TRAINING PROGRAM

## 2023-11-07 PROCEDURE — 3288F PR FALLS RISK ASSESSMENT DOCUMENTED: ICD-10-PCS | Mod: CPTII,S$GLB,, | Performed by: STUDENT IN AN ORGANIZED HEALTH CARE EDUCATION/TRAINING PROGRAM

## 2023-11-07 PROCEDURE — 1159F MED LIST DOCD IN RCRD: CPT | Mod: CPTII,S$GLB,, | Performed by: STUDENT IN AN ORGANIZED HEALTH CARE EDUCATION/TRAINING PROGRAM

## 2023-11-07 PROCEDURE — 3288F FALL RISK ASSESSMENT DOCD: CPT | Mod: CPTII,S$GLB,, | Performed by: STUDENT IN AN ORGANIZED HEALTH CARE EDUCATION/TRAINING PROGRAM

## 2023-11-07 PROCEDURE — 20610 DRAIN/INJ JOINT/BURSA W/O US: CPT | Mod: RT,S$GLB,, | Performed by: STUDENT IN AN ORGANIZED HEALTH CARE EDUCATION/TRAINING PROGRAM

## 2023-11-07 PROCEDURE — 1101F PT FALLS ASSESS-DOCD LE1/YR: CPT | Mod: CPTII,S$GLB,, | Performed by: STUDENT IN AN ORGANIZED HEALTH CARE EDUCATION/TRAINING PROGRAM

## 2023-11-07 PROCEDURE — 99999 PR PBB SHADOW E&M-EST. PATIENT-LVL IV: CPT | Mod: PBBFAC,,, | Performed by: ORTHOPAEDIC SURGERY

## 2023-11-07 PROCEDURE — 1101F PR PT FALLS ASSESS DOC 0-1 FALLS W/OUT INJ PAST YR: ICD-10-PCS | Mod: CPTII,S$GLB,, | Performed by: STUDENT IN AN ORGANIZED HEALTH CARE EDUCATION/TRAINING PROGRAM

## 2023-11-07 PROCEDURE — 73564 X-RAY EXAM KNEE 4 OR MORE: CPT | Mod: TC,PN,RT

## 2023-11-07 PROCEDURE — 99204 OFFICE O/P NEW MOD 45 MIN: CPT | Mod: 25,S$GLB,, | Performed by: STUDENT IN AN ORGANIZED HEALTH CARE EDUCATION/TRAINING PROGRAM

## 2023-11-07 PROCEDURE — 99999 PR PBB SHADOW E&M-EST. PATIENT-LVL IV: ICD-10-PCS | Mod: PBBFAC,,, | Performed by: ORTHOPAEDIC SURGERY

## 2023-11-07 PROCEDURE — 99204 PR OFFICE/OUTPT VISIT, NEW, LEVL IV, 45-59 MIN: ICD-10-PCS | Mod: 25,S$GLB,, | Performed by: STUDENT IN AN ORGANIZED HEALTH CARE EDUCATION/TRAINING PROGRAM

## 2023-11-07 PROCEDURE — 3078F PR MOST RECENT DIASTOLIC BLOOD PRESSURE < 80 MM HG: ICD-10-PCS | Mod: CPTII,S$GLB,, | Performed by: STUDENT IN AN ORGANIZED HEALTH CARE EDUCATION/TRAINING PROGRAM

## 2023-11-07 PROCEDURE — 1159F PR MEDICATION LIST DOCUMENTED IN MEDICAL RECORD: ICD-10-PCS | Mod: CPTII,S$GLB,, | Performed by: STUDENT IN AN ORGANIZED HEALTH CARE EDUCATION/TRAINING PROGRAM

## 2023-11-07 PROCEDURE — 73564 X-RAY EXAM KNEE 4 OR MORE: CPT | Mod: 26,RT,, | Performed by: RADIOLOGY

## 2023-11-07 PROCEDURE — 1125F PR PAIN SEVERITY QUANTIFIED, PAIN PRESENT: ICD-10-PCS | Mod: CPTII,S$GLB,, | Performed by: STUDENT IN AN ORGANIZED HEALTH CARE EDUCATION/TRAINING PROGRAM

## 2023-11-07 PROCEDURE — 1125F AMNT PAIN NOTED PAIN PRSNT: CPT | Mod: CPTII,S$GLB,, | Performed by: STUDENT IN AN ORGANIZED HEALTH CARE EDUCATION/TRAINING PROGRAM

## 2023-11-07 PROCEDURE — 73564 XR KNEE COMP 4 OR MORE VIEWS RIGHT: ICD-10-PCS | Mod: 26,RT,, | Performed by: RADIOLOGY

## 2023-11-07 RX ORDER — LIDOCAINE HYDROCHLORIDE 10 MG/ML
4 INJECTION INFILTRATION; PERINEURAL
Status: DISCONTINUED | OUTPATIENT
Start: 2023-11-07 | End: 2023-11-07 | Stop reason: HOSPADM

## 2023-11-07 RX ORDER — KETOROLAC TROMETHAMINE 30 MG/ML
30 INJECTION, SOLUTION INTRAMUSCULAR; INTRAVENOUS
Status: DISCONTINUED | OUTPATIENT
Start: 2023-11-07 | End: 2023-11-07 | Stop reason: HOSPADM

## 2023-11-07 RX ORDER — BUPIVACAINE HYDROCHLORIDE 5 MG/ML
5 INJECTION, SOLUTION PERINEURAL
Status: DISCONTINUED | OUTPATIENT
Start: 2023-11-07 | End: 2023-11-07 | Stop reason: HOSPADM

## 2023-11-07 RX ADMIN — KETOROLAC TROMETHAMINE 30 MG: 30 INJECTION, SOLUTION INTRAMUSCULAR; INTRAVENOUS at 02:11

## 2023-11-07 RX ADMIN — BUPIVACAINE HYDROCHLORIDE 5 ML: 5 INJECTION, SOLUTION PERINEURAL at 02:11

## 2023-11-07 RX ADMIN — LIDOCAINE HYDROCHLORIDE 4 ML: 10 INJECTION INFILTRATION; PERINEURAL at 02:11

## 2023-11-07 NOTE — PROCEDURES
Large Joint Aspiration/Injection: R knee    Date/Time: 11/7/2023 2:00 PM    Performed by: Trey Sagastume MD  Authorized by: Trey Sagastume MD    Consent Done?:  Yes (Verbal)  Indications:  Arthritis  Site marked: the procedure site was marked    Timeout: prior to procedure the correct patient, procedure, and site was verified    Prep: patient was prepped and draped in usual sterile fashion      Local anesthesia used?: Yes    Local anesthetic:  Topical anesthetic    Details:  Needle Size:  22 G  Ultrasonic Guidance for needle placement?: No    Approach:  Anterolateral  Location:  Knee  Site:  R knee  Medications:  30 mg ketorolac 30 mg/mL (1 mL); 5 mL BUPivacaine 0.5 % (5 mg/mL); 4 mL LIDOcaine HCL 10 mg/ml (1%) 10 mg/mL (1 %)  Patient tolerance:  Patient tolerated the procedure well with no immediate complications

## 2023-11-07 NOTE — PROGRESS NOTES
[facility] [address]    Subjective:     Patient ID: Karen Duke is a 78 y.o. female.    Chief Complaint: Pain of the Right Knee    HPI:    KNEE PAIN: Complains of pain to the rightknee.   PAIN LOCATED: medial  ONSET: 5 years ago.  QUALITY:  Patient states the pain is worsening  HISTORY: Previous knee injury/surgery: Yes  Hx: injury in college, nonsurgical, walked with crutches for many months  ASSOCIATED SYMPTOM AND TRIGGERS:Standing/Weightbearing, walking, trouble w stairs, stiffness, swelling, limping, stiffness w sitting   USES ASSISTIVE DEVICE: none  RELIEVED BY:nothing  PATIENT DENIES: bruising, redness, deformity     Uses a knee sleeve.  she has tried Advil 2 minimal relief.  Had 1 injection into her right knee many years ago.  Did not provide any relief.  Difficulty standing for prolonged periods of time.  Difficulty walking.  Has subjective instability of her right knee.  No history of previous surgery on her knees    Past Medical History:   Diagnosis Date    Breast cancer 10/2017    left breast    Diabetes mellitus     Hyperlipidemia     Hypertension         Past Surgical History:   Procedure Laterality Date    BREAST BIOPSY Left 10/2017    BREAST LUMPECTOMY Left 10/2017    EYE SURGERY Right     cataract    Hammer toe Left         Current Outpatient Medications   Medication Instructions    acetaZOLAMIDE (DIAMOX) 250 MG tablet No dose, route, or frequency recorded.    allopurinoL (ZYLOPRIM) 100 mg, Oral, Daily    anastrozole (ARIMIDEX) 1 mg, Oral, Daily    aspirin (ECOTRIN) 81 mg, Oral, Daily    cholecalciferol (vitamin D3) 50,000 Units, Oral, Weekly    diclofenac sodium (VOLTAREN) 2 g, Topical (Top), 3 times daily    FLUAD 7126-9991, 65 YR UP,,PF, 45 mcg (15 mcg x 3)/0.5 mL Syrg No dose, route, or frequency recorded.    ibuprofen (ADVIL,MOTRIN) 100 mg/5 mL suspension Oral, Every 6 hours PRN    latanoprost 0.005 % ophthalmic solution No dose, route, or frequency recorded.     loratadine (CLARITIN) 5 mg, Oral, Daily    metFORMIN (GLUCOPHAGE-XR) 500 mg, Oral, With breakfast    metOLazone (ZAROXOLYN) 5 MG tablet No dose, route, or frequency recorded.    neomycin-polymyxin-dexamethasone (DEXACINE) 3.5 mg/g-10,000 unit/g-0.1 % Oint No dose, route, or frequency recorded.    olmesartan-hydrochlorothiazide (BENICAR HCT) 40-25 mg per tablet No dose, route, or frequency recorded.    ondansetron (ZOFRAN) 4 mg, Oral, Every 6 hours PRN    PAXLOVID, EUA, 300 mg (150 mg x 2)-100 mg copackaged tablets (EUA) Oral    peg 3350-electrolytes-vit C (MOVIPREP) 100-7.5-2.691 gram PwPk Take as directed    potassium chloride SA (K-DUR,KLOR-CON) 20 MEQ tablet 20 mEq, Oral, Daily    pravastatin (PRAVACHOL) 80 MG tablet No dose, route, or frequency recorded.    pravastatin (PRAVACHOL) 40 mg, 2 times daily    traMADol (ULTRAM) 50 mg tablet No dose, route, or frequency recorded.        Review of patient's allergies indicates:  No Known Allergies    Social History     Socioeconomic History    Marital status: Single   Tobacco Use    Smoking status: Former     Current packs/day: 0.90     Average packs/day: 0.9 packs/day for 5.0 years (4.5 ttl pk-yrs)     Types: Cigarettes    Smokeless tobacco: Former   Substance and Sexual Activity    Alcohol use: Yes     Alcohol/week: 1.0 standard drink of alcohol     Types: 1 Shots of liquor per week    Drug use: No       Family History   Problem Relation Age of Onset    Breast cancer Maternal Aunt     Lung cancer Maternal Grandmother     Breast cancer Maternal Cousin                Objective:   Ortho/SPM Exam  Gen: AOx3, NAD  CV: RRR via resting pulse  Pulm: No increased work of breathing on room air  MSK:  Ambulation: antalgic gait without the use of assistive devices    Right lower extremity:   No gross deformity, effusion, open wounds, or previous surgical incisions  Medial knee joint line.  No TTP over the lateral knee joint line  ROM:   Hip flexion: >90 degrees  No pain with ROM  of hip   Knee flexion and extension:   Motor intact: EHL/FHL/GSC/TA/Quad/Ham/IP  SILT distally  Knee ligamentously stable: (negative lachman's, anterior/posterior drawer, varus/valgus stress test).   Foot WWP    Imaging:  XR of right knee:  Severe tricompartmental bone-on-bone arthritis. KL4    Assessment:  Karen Duke is a 78 y.o. female with end-stage right knee tricompartmental arthritis.    Plan :    we injected the right knee w 1cc of ketorolac, marcaine and lidocaine under sterile conditions with the patient's informed consent for severe bone on bone knee oa  Weight loss for eventual TKA. Patient will talk with PCP as she is a diabetic and may qualify for ozempic   I discussed a new treatment therapy called Iovera, which is cryotherapy, to provide symptomatic relief along the sensory distribution of the infrapatellar tendon branch of the saphenous nerve, AFCN, and LFCN.  The patient elected to move forward with this.  We did discuss the fact that this is a fairly novel procedure and the is very limited scientific data around this; however, it is FDA approved.  In a few patients there can be continued pain along the treated nerve but the exact risk has not been quantified but seems to be rare. The patient was given patient information and literature to review prior to the procedure as well. Based on this, the patient feels the benefits outweigh the risks.  Follow up for garo Chan MD   \[facility]\"" Orthopaedics PGY-3  11/07/2023

## 2023-11-30 ENCOUNTER — PROCEDURE VISIT (OUTPATIENT)
Dept: ORTHOPEDICS | Facility: CLINIC | Age: 78
End: 2023-11-30
Payer: MEDICARE

## 2023-11-30 VITALS
SYSTOLIC BLOOD PRESSURE: 132 MMHG | WEIGHT: 260.81 LBS | BODY MASS INDEX: 49.24 KG/M2 | HEART RATE: 105 BPM | DIASTOLIC BLOOD PRESSURE: 85 MMHG | HEIGHT: 61 IN

## 2023-11-30 DIAGNOSIS — G89.29 CHRONIC PAIN OF RIGHT KNEE: ICD-10-CM

## 2023-11-30 DIAGNOSIS — M17.11 PRIMARY OSTEOARTHRITIS OF RIGHT KNEE: ICD-10-CM

## 2023-11-30 DIAGNOSIS — M25.561 CHRONIC PAIN OF RIGHT KNEE: ICD-10-CM

## 2023-11-30 PROCEDURE — 99499 UNLISTED E&M SERVICE: CPT | Mod: S$GLB,,, | Performed by: ORTHOPAEDIC SURGERY

## 2023-11-30 PROCEDURE — 99499 NO LOS: ICD-10-PCS | Mod: S$GLB,,, | Performed by: ORTHOPAEDIC SURGERY

## 2023-11-30 PROCEDURE — 64640 INJECTION TREATMENT OF NERVE: CPT | Mod: RT,S$GLB,, | Performed by: ORTHOPAEDIC SURGERY

## 2023-11-30 PROCEDURE — 64640 PR DESTRUCT BY NEURO AGENT; OTHER PERIPH NERVE: ICD-10-PCS | Mod: RT,S$GLB,, | Performed by: ORTHOPAEDIC SURGERY

## 2023-11-30 NOTE — PROCEDURES
Procedure Note Iovera:    DATE OF PROCEDURE:  11/30/2023    PREOPERATIVE DIAGNOSIS: right knee pain.     POSTOPERATIVE DIAGNOSIS: right knee pain.     PROCEDURE: Iovera treatment of anterior femoral cutaneous nerve, Lateral femoral cut nerve, and both branches of infrapatellar saphenous nerve using at least 4 different punctures to treat all 4 nerves. (cpt 64640 x4)    ATTENDING SURGEON: Trey Sagastume M.D.   ASSISTANT: idania nielsen    COMPLICATIONS: None.     IMPLANTS:  None    ESTIMATED BLOOD LOSS:  < 5cc    SPECIMENS REMOVED:  None    ANESTHESIA: Local lidocaine    INDICATIONS FOR PROCEDURE: This is a 78 y.o. female with longstanding knee pain. They have failed non operative management including injections.I discussed a new treatment therapy called Iovera, which is cryotherapy, to provide symptomatic relief along the sensory distribution of the infrapatellar tendon branch of the saphenous nerve  and AFCN. The patient elected to move forward with this  We did discuss the fact that this is a fairly novel procedure and there is very limited scientific data around this.  However, it FDA approved.  The patient was given patient information and literature to review prior to the procedure as well.  Based on this, the patient agreed to move forward with doing the procedure.      PROCEDURE:    The patient was placed supine on the exam table and the proximal medial aspect of the right tibia and anterior aspect of distal femur was prepped with sterile Betadine and alcohol.  A line was drawn extending approximately 5 cm medial to inferior pole of the patella distally to a point approximately 5 cm medial to the tibial tubercle.  A second line was drawn in a medial to lateral direction the width of the patella approximately 7 cm proximal to the patella. We then infiltrated the skin with lidocaine along both lines using a 25g needle. We then introduced the Iovera device along these lines and this device penetrated the skin, creating  cryotherapy to both branches of the infrapatellar saphenous nerve, a third treatment to the anterior femoral cutaneous nerve, and fourth LFCN. 7 punctures of the skin were made to treat the 2 branches of the ISN and another 7 punctures were made to treat the AFCN and LFCN. There were a total of 4 nerves treated with iovera. The patient tolerated the procedure well with no problems.

## 2024-04-19 ENCOUNTER — HOSPITAL ENCOUNTER (OUTPATIENT)
Dept: RADIOLOGY | Facility: HOSPITAL | Age: 79
Discharge: HOME | End: 2024-04-19
Attending: INTERNAL MEDICINE
Payer: MEDICARE

## 2024-04-19 DIAGNOSIS — Z12.31 ENCOUNTER FOR SCREENING MAMMOGRAM FOR MALIGNANT NEOPLASM OF BREAST: ICD-10-CM

## 2024-04-19 DIAGNOSIS — C50.912 CANCER OF LEFT BREAST, STAGE 1, ESTROGEN RECEPTOR POSITIVE: ICD-10-CM

## 2024-04-19 DIAGNOSIS — Z17.0 CANCER OF LEFT BREAST, STAGE 1, ESTROGEN RECEPTOR POSITIVE: ICD-10-CM

## 2024-04-19 PROCEDURE — 77067 SCR MAMMO BI INCL CAD: CPT | Mod: TC

## 2024-04-19 PROCEDURE — 77063 BREAST TOMOSYNTHESIS BI: CPT | Mod: 26,,, | Performed by: RADIOLOGY

## 2024-04-19 PROCEDURE — 77067 SCR MAMMO BI INCL CAD: CPT | Mod: 26,,, | Performed by: RADIOLOGY

## 2024-04-23 ENCOUNTER — TELEPHONE (OUTPATIENT)
Dept: HEMATOLOGY/ONCOLOGY | Facility: CLINIC | Age: 79
End: 2024-04-23
Payer: MEDICARE

## 2024-04-23 DIAGNOSIS — Z12.31 ENCOUNTER FOR SCREENING MAMMOGRAM FOR MALIGNANT NEOPLASM OF BREAST: Primary | ICD-10-CM

## 2024-10-16 DIAGNOSIS — E87.6 HYPOKALEMIA: ICD-10-CM

## 2024-10-16 RX ORDER — POTASSIUM CHLORIDE 20 MEQ/1
TABLET, EXTENDED RELEASE ORAL
Qty: 90 TABLET | Refills: 0 | Status: SHIPPED | OUTPATIENT
Start: 2024-10-16

## 2025-01-13 DIAGNOSIS — E87.6 HYPOKALEMIA: ICD-10-CM

## 2025-01-13 RX ORDER — POTASSIUM CHLORIDE 20 MEQ/1
20 TABLET, EXTENDED RELEASE ORAL
Qty: 90 TABLET | Refills: 0 | Status: SHIPPED | OUTPATIENT
Start: 2025-01-13

## 2025-04-24 PROBLEM — Z17.0 CANCER OF LEFT BREAST, STAGE 1, ESTROGEN RECEPTOR POSITIVE: Status: RESOLVED | Noted: 2017-09-27 | Resolved: 2025-04-24

## 2025-04-24 PROBLEM — C50.912 CANCER OF LEFT BREAST, STAGE 1, ESTROGEN RECEPTOR POSITIVE: Status: RESOLVED | Noted: 2017-09-27 | Resolved: 2025-04-24

## 2025-04-24 PROBLEM — E66.01 MORBID OBESITY: Status: ACTIVE | Noted: 2025-04-24

## 2025-04-26 DIAGNOSIS — E87.6 HYPOKALEMIA: ICD-10-CM

## 2025-04-26 RX ORDER — POTASSIUM CHLORIDE 20 MEQ/1
20 TABLET, EXTENDED RELEASE ORAL
Qty: 90 TABLET | Refills: 3 | Status: SHIPPED | OUTPATIENT
Start: 2025-04-26

## 2025-04-29 ENCOUNTER — TELEPHONE (OUTPATIENT)
Dept: HEMATOLOGY/ONCOLOGY | Facility: CLINIC | Age: 80
End: 2025-04-29
Payer: MEDICARE

## 2025-04-29 NOTE — TELEPHONE ENCOUNTER
Mrs. Myers called pt to confirm appt when pt asked about her mammogram that she was a no show to on 4/24/25. Check pt chart her mammogram was confirmed last year 4/2024 when her last mammo results was given and the yearly mammo was scheduled. Confirmed the new joie for 5/2 at 10:15 and rescheduled f/u john from 4/30 to 5/29 at 11:40.

## 2025-05-02 ENCOUNTER — HOSPITAL ENCOUNTER (OUTPATIENT)
Dept: RADIOLOGY | Facility: HOSPITAL | Age: 80
Discharge: HOME | End: 2025-05-02
Attending: INTERNAL MEDICINE
Payer: MEDICARE

## 2025-05-02 DIAGNOSIS — Z12.31 ENCOUNTER FOR SCREENING MAMMOGRAM FOR MALIGNANT NEOPLASM OF BREAST: ICD-10-CM

## 2025-05-02 PROCEDURE — 77067 SCR MAMMO BI INCL CAD: CPT | Mod: TC

## 2025-05-02 PROCEDURE — 77067 SCR MAMMO BI INCL CAD: CPT | Mod: 26,,, | Performed by: RADIOLOGY

## 2025-05-02 PROCEDURE — 77063 BREAST TOMOSYNTHESIS BI: CPT | Mod: 26,,, | Performed by: RADIOLOGY

## 2025-05-16 NOTE — PROGRESS NOTES
PATIENT: Karen Duke  MRN: [id number]  DATE: 5/29/2025    Diagnosis:   1. History of breast cancer    2. Morbid obesity    3. Encounter for screening mammogram for malignant neoplasm of breast    4. Sickle cell trait    5. Orthostatic hypotension      Chief Complaint: history of breast cancer    Subjective:     Pertinent Medical History:     PCP Dr. Sarina Macias.     She was diagnosed with left breast cancer on routine screening mammogram - in September 2017. A 1.1 centimeter lobulated mass was noticed in the 8:00 position in the left breast.  Images were done at [facility].  Biopsy was done on 9/22/17.  It was high-grade invasive ductal carcinoma - ER +96%, NY +94% and HER-2/bharti IHC 3+.  Ki-67 was 37%.      She has h/o sickle cell trait with no manifestation.  No history of blood transfusion.     Underwent left breast lumpectomy on 10/12/17. Lumpectomy specimen showed 2 foci of high grade (grade 3) invasive carcinoma - measuring 10 millimeters and 13 millimeters. Margins negative.  1 sentinel lymph node removed and it was negative.     Received 12 doses of weekly Taxol/Herceptin from Nov 2017 until Feb 2018. 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 Then completed adjuvant RT to left breast on 5/10/18.     Restarted adjuvant Herceptin 6/15/18. She got 3 doses.  She then started to have lots of chest pains and decided to discontinue the Herceptin infusions.  Last dose July 2018     Started Arimidex August 2018 and has been tolerating that well without any problems and issues.    - she had a mammogram on 1/27/23.    - she completed anastrozole in August 2023.    Interval History:   - she is here for follow-up of carcinoma of the left breast. She had previously seen Dr. Wilkins.  - she had a mammogram on 4/19/24 and 5/2/25  - she endorses fatigue, weakness, light-headedness, knee pain, dyspnea upon exertion.       Past Medical, Surgical, Family, and Social histories reviewed.    Medication and Allergies reviewed.    Review of  "Systems   Constitutional:  Positive for fatigue. Negative for fever and unexpected weight change.   HENT:  Negative for sore throat.    Eyes:  Negative for visual disturbance.   Respiratory:  Positive for shortness of breath. Negative for cough.    Cardiovascular:  Negative for chest pain.   Gastrointestinal:  Negative for abdominal pain, constipation, diarrhea, nausea and vomiting.   Genitourinary:  Negative for dysuria.   Musculoskeletal:  Positive for arthralgias. Negative for back pain.   Skin:  Negative for rash.   Neurological:  Positive for weakness and light-headedness. Negative for headaches.   Hematological:  Negative for adenopathy.   Psychiatric/Behavioral:  The patient is not nervous/anxious.        Objective:     Vitals:    05/29/25 1130   BP: (!) 141/71   BP Location: Left arm   Patient Position: Sitting   Pulse: (!) 112   Resp: 18   Temp: 98.5 °F (36.9 °C)   TempSrc: Oral   SpO2: (!) 94%   Weight: 121.9 kg (268 lb 11.9 oz)   Height: 5' 1" (1.549 m)         BMI: Body mass index is 50.78 kg/m².    Physical Exam  Vitals and nursing note reviewed.   Constitutional:       General: She is not in acute distress.     Appearance: She is well-developed.   HENT:      Head: Normocephalic and atraumatic.   Eyes:      Pupils: Pupils are equal, round, and reactive to light.   Cardiovascular:      Rate and Rhythm: Normal rate and regular rhythm.   Pulmonary:      Effort: Pulmonary effort is normal.      Breath sounds: Normal breath sounds.   Abdominal:      General: Bowel sounds are normal.      Palpations: Abdomen is soft.   Musculoskeletal:         General: Normal range of motion.      Cervical back: Normal range of motion and neck supple.   Skin:     General: Skin is warm and dry.   Neurological:      Mental Status: She is alert and oriented to person, place, and time.   Psychiatric:         Behavior: Behavior normal.         Thought Content: Thought content normal.         Judgment: Judgment normal. "         Imaging:    Mammogram (5/2/25):  There is no mammographic evidence of malignancy.       Mammogram (4/19/24):  There is no mammographic evidence of malignancy.     Mammogram (1/27/23):  There is no mammographic evidence of malignancy.       DXA scan (1/27/23):  Normal bone mineral density.      Assessment:       1. History of breast cancer    2. Morbid obesity    3. Encounter for screening mammogram for malignant neoplasm of breast    4. Sickle cell trait    5. Orthostatic hypotension      Plan:     History of cancer of left breast, stage 1, estrogen receptor positive  -she has stage I triple positive high-grade left breast invasive carcinoma status post lumpectomy October 2017  -completed adjuvant Herceptin based chemotherapy in July 2018  -completed radiation therapy to the left breast May 2018  -started Arimidex August 2018  -labs CBC, CMP and vitamin-D level reviewed  -DEXA January 2021 within normal limits  - DXA scan (1/27/23) revealed normal bone mineral density.  - mammogram (1/27/23) There is no mammographic evidence of malignancy.   - she completed anastrozole in August 2023.  Mammogram (4/19/24): There is no mammographic evidence of malignancy.   Mammogram (5/2/25): There is no mammographic evidence of malignancy.   - return to clinic in one year with repeat mammogram.     Morbid obesity  - Body mass index is 50.78 kg/m².  - continue to monitor    Sickle cell trait  - she states she has this. Likely explanation for microcytic anemia    Orthostatic hypotension  - she describes symptoms that sound like orthostatic hypotension. She takes antihypertensive medication that contains hydrochlorothiazide, which is likely causing dehydration  - she reports improvement in lightheadedness when she drinks water  - I encouraged her to drink more fluids.      - return to clinic in one year with repeat mammogram.      Heriberto Roque M.D.  Hematology/Oncology  [facility] - [address]  [place], [address] [place] [id number]  Phone: [phone]  Fax: [phone]

## 2025-05-29 ENCOUNTER — OFFICE VISIT (OUTPATIENT)
Dept: HEMATOLOGY/ONCOLOGY | Facility: CLINIC | Age: 80
End: 2025-05-29
Payer: MEDICARE

## 2025-05-29 VITALS
TEMPERATURE: 99 F | RESPIRATION RATE: 18 BRPM | WEIGHT: 268.75 LBS | DIASTOLIC BLOOD PRESSURE: 71 MMHG | HEIGHT: 61 IN | BODY MASS INDEX: 50.74 KG/M2 | OXYGEN SATURATION: 94 % | SYSTOLIC BLOOD PRESSURE: 141 MMHG | HEART RATE: 112 BPM

## 2025-05-29 DIAGNOSIS — I95.1 ORTHOSTATIC HYPOTENSION: ICD-10-CM

## 2025-05-29 DIAGNOSIS — Z12.31 ENCOUNTER FOR SCREENING MAMMOGRAM FOR MALIGNANT NEOPLASM OF BREAST: ICD-10-CM

## 2025-05-29 DIAGNOSIS — E66.01 MORBID OBESITY: ICD-10-CM

## 2025-05-29 DIAGNOSIS — D57.3 SICKLE CELL TRAIT: ICD-10-CM

## 2025-05-29 DIAGNOSIS — Z85.3 HISTORY OF BREAST CANCER: Primary | ICD-10-CM

## 2025-05-29 PROCEDURE — 99999 PR PBB SHADOW E&M-EST. PATIENT-LVL V: CPT | Mod: PBBFAC,,, | Performed by: INTERNAL MEDICINE

## (undated) DEVICE — APPLICATOR CHLORAPREP ORN 26ML

## (undated) DEVICE — SUT VICRYL 4-0 27 SH

## (undated) DEVICE — SET DECANTER MEDICHOICE

## (undated) DEVICE — SKINMARKER W/RULER DEVON

## (undated) DEVICE — SUT MONOCRYL 4-0 PS-2

## (undated) DEVICE — DRAPE C-ARM/MOBILE XRAY 44X80

## (undated) DEVICE — DRAPE TIBURON ORTHOPEDIC SPLIT

## (undated) DEVICE — GLOVE BIOGEL ECLIPSE SZ 6

## (undated) DEVICE — DRAIN CHANNEL ROUND 19FR

## (undated) DEVICE — UNDERGLOVE BIOGEL PI SZ 6.5 LF

## (undated) DEVICE — SYR 30CC LUER LOCK

## (undated) DEVICE — GAUZE SPONGE 4X4 12PLY

## (undated) DEVICE — SUT PROLENE 4-0 PS2 18 BLUE

## (undated) DEVICE — SUT MONOCRYL 5-0 P-3 UND 18

## (undated) DEVICE — DRESSING TEGADERM 2 3/8 X 2.75

## (undated) DEVICE — NDL HYPO A BEVEL 22X1 1/2

## (undated) DEVICE — DRESSING AQUACEL SACRAL 9 X 9

## (undated) DEVICE — STOCKINET TUBULAR 1 PLY 6X60IN

## (undated) DEVICE — COVER PROBE NEOGUARD 1X11.8IN

## (undated) DEVICE — SEE MEDLINE ITEM 157116

## (undated) DEVICE — SUT ETHILON 3-0 PS2 18 BLK

## (undated) DEVICE — SUT 5-0 MONO P-3 18IN

## (undated) DEVICE — SPONGE DERMA 8PLY 2X2

## (undated) DEVICE — ELECTRODE REM PLYHSV RETURN 9

## (undated) DEVICE — DRAPE LAP TIBURON 77X122IN

## (undated) DEVICE — PACK BASIC

## (undated) DEVICE — BLADE #15 STERILE CARBON

## (undated) DEVICE — SEE MEDLINE ITEM 157131

## (undated) DEVICE — SUT VICRYL 3-0 27 SH

## (undated) DEVICE — SUT CTD VICRYL 3-0 CR/SH

## (undated) DEVICE — MANIFOLD 4 PORT

## (undated) DEVICE — SUT 4/0 27IN PDS II VIO MON

## (undated) DEVICE — ELECTRODE BLADE INSULATED 1 IN

## (undated) DEVICE — SUT 4-0 VICRYL / SH

## (undated) DEVICE — NDL HYPO REG 25G X 1 1/2

## (undated) DEVICE — COVER OVERHEAD SURG LT BLUE

## (undated) DEVICE — APPLIER LIGACLIP SM 9.38IN

## (undated) DEVICE — SPONGE LAP 18X18 PREWASHED

## (undated) DEVICE — SEE MEDLINE ITEM 156955

## (undated) DEVICE — BLADE SURG CARBON STEEL SZ11

## (undated) DEVICE — SEE MEDLINE ITEM 152622

## (undated) DEVICE — SPONGE DERMACEA GAUZE 4X4

## (undated) DEVICE — DRAPE STERI INSTRUMENT 1018

## (undated) DEVICE — COVER PROBE 6X48

## (undated) DEVICE — CLOSURE SKIN STERI STRIP 1/2X4

## (undated) DEVICE — SEE MEDLINE ITEM 146292

## (undated) DEVICE — SUT 2/0 30IN SILK BLK BRAI

## (undated) DEVICE — DRESSING TRANS 4X4 3/4

## (undated) DEVICE — SEE MEDLINE ITEM 146231

## (undated) DEVICE — BRA CLASSIC COMFORT 44 BLACK

## (undated) DEVICE — EVACUATOR WOUND BULB 100CC

## (undated) DEVICE — SYR 10CC LUER LOCK